# Patient Record
Sex: MALE | Race: WHITE | NOT HISPANIC OR LATINO | Employment: FULL TIME | URBAN - METROPOLITAN AREA
[De-identification: names, ages, dates, MRNs, and addresses within clinical notes are randomized per-mention and may not be internally consistent; named-entity substitution may affect disease eponyms.]

---

## 2017-05-12 ENCOUNTER — APPOINTMENT (OUTPATIENT)
Dept: WOMENS IMAGING | Facility: HOSPITAL | Age: 52
End: 2017-05-12

## 2017-05-12 PROCEDURE — 71035: CPT | Performed by: RADIOLOGY

## 2018-01-10 ENCOUNTER — OFFICE VISIT CONVERTED (OUTPATIENT)
Dept: UROLOGY | Facility: CLINIC | Age: 53
End: 2018-01-10
Attending: NURSE PRACTITIONER

## 2018-02-08 ENCOUNTER — OFFICE VISIT CONVERTED (OUTPATIENT)
Dept: FAMILY MEDICINE CLINIC | Facility: CLINIC | Age: 53
End: 2018-02-08
Attending: NURSE PRACTITIONER

## 2018-03-15 ENCOUNTER — OFFICE VISIT CONVERTED (OUTPATIENT)
Dept: FAMILY MEDICINE CLINIC | Facility: CLINIC | Age: 53
End: 2018-03-15
Attending: NURSE PRACTITIONER

## 2018-04-24 ENCOUNTER — OFFICE VISIT CONVERTED (OUTPATIENT)
Dept: UROLOGY | Facility: CLINIC | Age: 53
End: 2018-04-24
Attending: NURSE PRACTITIONER

## 2018-04-24 ENCOUNTER — CONVERSION ENCOUNTER (OUTPATIENT)
Dept: UROLOGY | Facility: CLINIC | Age: 53
End: 2018-04-24

## 2018-05-17 ENCOUNTER — APPOINTMENT (OUTPATIENT)
Dept: WOMENS IMAGING | Facility: HOSPITAL | Age: 53
End: 2018-05-17

## 2018-05-17 PROCEDURE — 71045 X-RAY EXAM CHEST 1 VIEW: CPT | Performed by: RADIOLOGY

## 2018-06-04 ENCOUNTER — OFFICE VISIT CONVERTED (OUTPATIENT)
Dept: FAMILY MEDICINE CLINIC | Facility: CLINIC | Age: 53
End: 2018-06-04
Attending: NURSE PRACTITIONER

## 2018-06-04 ENCOUNTER — CONVERSION ENCOUNTER (OUTPATIENT)
Dept: FAMILY MEDICINE CLINIC | Facility: CLINIC | Age: 53
End: 2018-06-04

## 2018-07-26 ENCOUNTER — CONVERSION ENCOUNTER (OUTPATIENT)
Dept: GASTROENTEROLOGY | Facility: CLINIC | Age: 53
End: 2018-07-26

## 2018-07-26 ENCOUNTER — OFFICE VISIT CONVERTED (OUTPATIENT)
Dept: GASTROENTEROLOGY | Facility: CLINIC | Age: 53
End: 2018-07-26
Attending: NURSE PRACTITIONER

## 2018-11-19 ENCOUNTER — CONVERSION ENCOUNTER (OUTPATIENT)
Dept: UROLOGY | Facility: CLINIC | Age: 53
End: 2018-11-19

## 2018-11-19 ENCOUNTER — OFFICE VISIT CONVERTED (OUTPATIENT)
Dept: UROLOGY | Facility: CLINIC | Age: 53
End: 2018-11-19
Attending: NURSE PRACTITIONER

## 2018-12-04 ENCOUNTER — OFFICE VISIT CONVERTED (OUTPATIENT)
Dept: FAMILY MEDICINE CLINIC | Facility: CLINIC | Age: 53
End: 2018-12-04
Attending: NURSE PRACTITIONER

## 2019-05-09 ENCOUNTER — HOSPITAL ENCOUNTER (OUTPATIENT)
Dept: OTHER | Facility: HOSPITAL | Age: 54
Discharge: HOME | End: 2019-05-09

## 2019-05-09 LAB
ALBUMIN SERPL-MCNC: 4.5 G/DL (ref 3.5–5)
ALBUMIN/GLOB SERPL: 1.7 {RATIO} (ref 1.4–2.6)
ALP SERPL-CCNC: 42 U/L (ref 56–119)
ALT SERPL-CCNC: 36 U/L (ref 10–40)
ANION GAP SERPL CALC-SCNC: 17 MMOL/L (ref 8–19)
AST SERPL-CCNC: 22 U/L (ref 15–50)
BASOPHILS # BLD AUTO: 0.07 10*3/UL (ref 0–0.2)
BASOPHILS NFR BLD AUTO: 0.7 % (ref 0–3)
BILIRUB SERPL-MCNC: 0.4 MG/DL (ref 0.2–1.3)
BUN SERPL-MCNC: 17 MG/DL (ref 5–25)
BUN/CREAT SERPL: 16 {RATIO} (ref 6–20)
CALCIUM SERPL-MCNC: 9.6 MG/DL (ref 8.7–10.4)
CHLORIDE SERPL-SCNC: 98 MMOL/L (ref 99–111)
CONV ABS IMM GRAN: 0.04 10*3/UL (ref 0–0.2)
CONV CO2: 25 MMOL/L (ref 22–32)
CONV IMMATURE GRAN: 0.4 % (ref 0–1.8)
CONV TOTAL PROTEIN: 7.2 G/DL (ref 6.3–8.2)
CREAT UR-MCNC: 1.07 MG/DL (ref 0.7–1.2)
DEPRECATED RDW RBC AUTO: 48 FL (ref 35.1–43.9)
EOSINOPHIL # BLD AUTO: 0.24 10*3/UL (ref 0–0.7)
EOSINOPHIL # BLD AUTO: 2.4 % (ref 0–7)
ERYTHROCYTE [DISTWIDTH] IN BLOOD BY AUTOMATED COUNT: 14.6 % (ref 11.6–14.4)
FSH SERPL-ACNC: 0.1 M[IU]/ML
GFR SERPLBLD BASED ON 1.73 SQ M-ARVRAT: >60 ML/MIN/{1.73_M2}
GLOBULIN UR ELPH-MCNC: 2.7 G/DL (ref 2–3.5)
GLUCOSE SERPL-MCNC: 108 MG/DL (ref 70–99)
HBA1C MFR BLD: 14.8 G/DL (ref 14–18)
HCT VFR BLD AUTO: 44.9 % (ref 42–52)
LH SERPL-ACNC: 0.1 M[IU]/ML
LYMPHOCYTES # BLD AUTO: 2.79 10*3/UL (ref 1–5)
MCH RBC QN AUTO: 29.7 PG (ref 27–31)
MCHC RBC AUTO-ENTMCNC: 33 G/DL (ref 33–37)
MCV RBC AUTO: 90 FL (ref 80–96)
MONOCYTES # BLD AUTO: 0.75 10*3/UL (ref 0.2–1.2)
MONOCYTES NFR BLD AUTO: 7.5 % (ref 3–10)
NEUTROPHILS # BLD AUTO: 6.05 10*3/UL (ref 2–8)
NEUTROPHILS NFR BLD AUTO: 60.9 % (ref 30–85)
NRBC CBCN: 0 % (ref 0–0.7)
OSMOLALITY SERPL CALC.SUM OF ELEC: 284 MOSM/KG (ref 273–304)
PLATELET # BLD AUTO: 188 10*3/UL (ref 130–400)
PMV BLD AUTO: 10.8 FL (ref 9.4–12.4)
POTASSIUM SERPL-SCNC: 3.8 MMOL/L (ref 3.5–5.3)
RBC # BLD AUTO: 4.99 10*6/UL (ref 4.7–6.1)
SODIUM SERPL-SCNC: 136 MMOL/L (ref 135–147)
VARIANT LYMPHS NFR BLD MANUAL: 28.1 % (ref 20–45)
WBC # BLD AUTO: 9.94 10*3/UL (ref 4.8–10.8)

## 2019-05-16 ENCOUNTER — HOSPITAL ENCOUNTER (OUTPATIENT)
Dept: OTHER | Facility: HOSPITAL | Age: 54
Discharge: HOME | End: 2019-05-16

## 2019-05-20 ENCOUNTER — CONVERSION ENCOUNTER (OUTPATIENT)
Dept: UROLOGY | Facility: CLINIC | Age: 54
End: 2019-05-20

## 2019-05-20 ENCOUNTER — OFFICE VISIT CONVERTED (OUTPATIENT)
Dept: UROLOGY | Facility: CLINIC | Age: 54
End: 2019-05-20
Attending: NURSE PRACTITIONER

## 2019-05-25 LAB
BIOAVAILABLE TESTOSTERONE, %: 60.7 %
CONV TESTOSTERONE, FREE: 102 PG/ML
SHBG SERPL-SCNC: 22.7 NMOL/L
TESTOST FREE MFR SERPL: 2.4 %
TESTOST SERPL-MCNC: 426 NG/DL
TESTOSTERONE.FREE+WB SERPL-MCNC: 259 NG/DL

## 2019-05-30 ENCOUNTER — HOSPITAL ENCOUNTER (OUTPATIENT)
Dept: CARDIOLOGY | Facility: HOSPITAL | Age: 54
Discharge: HOME | End: 2019-05-30
Attending: NURSE PRACTITIONER

## 2019-05-30 LAB
25(OH)D3 SERPL-MCNC: 34.8 NG/ML (ref 30–100)
ALBUMIN SERPL-MCNC: 4.5 G/DL (ref 3.5–5)
ALBUMIN/GLOB SERPL: 1.8 {RATIO} (ref 1.4–2.6)
ALP SERPL-CCNC: 37 U/L (ref 56–119)
ALT SERPL-CCNC: 29 U/L (ref 10–40)
ANION GAP SERPL CALC-SCNC: 17 MMOL/L (ref 8–19)
APTT BLD: 21.6 S (ref 22.2–34.2)
AST SERPL-CCNC: 19 U/L (ref 15–50)
BASOPHILS # BLD AUTO: 0.08 10*3/UL (ref 0–0.2)
BASOPHILS NFR BLD AUTO: 1 % (ref 0–3)
BILIRUB SERPL-MCNC: 0.42 MG/DL (ref 0.2–1.3)
BUN SERPL-MCNC: 12 MG/DL (ref 5–25)
BUN/CREAT SERPL: 15 {RATIO} (ref 6–20)
CALCIUM SERPL-MCNC: 9.2 MG/DL (ref 8.7–10.4)
CHLORIDE SERPL-SCNC: 101 MMOL/L (ref 99–111)
CHOLEST SERPL-MCNC: 119 MG/DL (ref 107–200)
CHOLEST/HDLC SERPL: 2.9 {RATIO} (ref 3–6)
CONV ABS IMM GRAN: 0.04 10*3/UL (ref 0–0.2)
CONV CO2: 26 MMOL/L (ref 22–32)
CONV IMMATURE GRAN: 0.5 % (ref 0–1.8)
CONV TOTAL PROTEIN: 7 G/DL (ref 6.3–8.2)
CREAT UR-MCNC: 0.79 MG/DL (ref 0.7–1.2)
DEPRECATED RDW RBC AUTO: 47.9 FL (ref 35.1–43.9)
EOSINOPHIL # BLD AUTO: 0.27 10*3/UL (ref 0–0.7)
EOSINOPHIL # BLD AUTO: 3.5 % (ref 0–7)
ERYTHROCYTE [DISTWIDTH] IN BLOOD BY AUTOMATED COUNT: 14.5 % (ref 11.6–14.4)
EST. AVERAGE GLUCOSE BLD GHB EST-MCNC: 154 MG/DL
GFR SERPLBLD BASED ON 1.73 SQ M-ARVRAT: >60 ML/MIN/{1.73_M2}
GLOBULIN UR ELPH-MCNC: 2.5 G/DL (ref 2–3.5)
GLUCOSE SERPL-MCNC: 129 MG/DL (ref 70–99)
HBA1C MFR BLD: 15 G/DL (ref 14–18)
HBA1C MFR BLD: 7 % (ref 3.5–5.7)
HCT VFR BLD AUTO: 45.7 % (ref 42–52)
HDLC SERPL-MCNC: 41 MG/DL (ref 40–60)
INR PPP: 0.95 (ref 2–3)
LDLC SERPL CALC-MCNC: 50 MG/DL (ref 70–100)
LYMPHOCYTES # BLD AUTO: 2.44 10*3/UL (ref 1–5)
MCH RBC QN AUTO: 29.6 PG (ref 27–31)
MCHC RBC AUTO-ENTMCNC: 32.8 G/DL (ref 33–37)
MCV RBC AUTO: 90.1 FL (ref 80–96)
MONOCYTES # BLD AUTO: 0.72 10*3/UL (ref 0.2–1.2)
MONOCYTES NFR BLD AUTO: 9.4 % (ref 3–10)
NEUTROPHILS # BLD AUTO: 4.11 10*3/UL (ref 2–8)
NEUTROPHILS NFR BLD AUTO: 53.7 % (ref 30–85)
NRBC CBCN: 0 % (ref 0–0.7)
OSMOLALITY SERPL CALC.SUM OF ELEC: 291 MOSM/KG (ref 273–304)
PHOSPHATE SERPL-MCNC: 3.2 MG/DL (ref 2.4–4.5)
PLATELET # BLD AUTO: 190 10*3/UL (ref 130–400)
PMV BLD AUTO: 10.7 FL (ref 9.4–12.4)
POTASSIUM SERPL-SCNC: 4.4 MMOL/L (ref 3.5–5.3)
PROTHROMBIN TIME: 10 S (ref 9.4–12)
RBC # BLD AUTO: 5.07 10*6/UL (ref 4.7–6.1)
SODIUM SERPL-SCNC: 140 MMOL/L (ref 135–147)
TRIGL SERPL-MCNC: 140 MG/DL (ref 40–150)
VARIANT LYMPHS NFR BLD MANUAL: 31.9 % (ref 20–45)
VLDLC SERPL-MCNC: 28 MG/DL (ref 5–37)
WBC # BLD AUTO: 7.66 10*3/UL (ref 4.8–10.8)

## 2019-05-31 LAB
C3 SERPL-MCNC: 126 MG/DL (ref 82–167)
C4 SERPL-MCNC: 18 MG/DL (ref 14–44)
CONV ANTI NUCLEAR ANTIBODY WITH REFLEX: NEGATIVE
CONV HEPATITIS B SURFACE AG W CONFIRMATION RE: NEGATIVE
HAV IGM SERPL QL IA: NEGATIVE
HBV CORE IGM SERPL QL IA: NEGATIVE
HCV AB SER DONR QL: <0.1 S/CO RATIO (ref 0–0.9)

## 2019-06-26 ENCOUNTER — HOSPITAL ENCOUNTER (OUTPATIENT)
Dept: FAMILY MEDICINE CLINIC | Facility: CLINIC | Age: 54
Discharge: HOME | End: 2019-06-26
Attending: NURSE PRACTITIONER

## 2019-06-26 ENCOUNTER — APPOINTMENT (OUTPATIENT)
Dept: WOMENS IMAGING | Facility: HOSPITAL | Age: 54
End: 2019-06-26

## 2019-06-26 PROCEDURE — 71045 X-RAY EXAM CHEST 1 VIEW: CPT | Performed by: RADIOLOGY

## 2019-08-07 ENCOUNTER — HOSPITAL ENCOUNTER (OUTPATIENT)
Dept: OTHER | Facility: HOSPITAL | Age: 54
Discharge: HOME | End: 2019-08-07
Attending: NURSE PRACTITIONER

## 2019-08-08 ENCOUNTER — OFFICE VISIT CONVERTED (OUTPATIENT)
Dept: FAMILY MEDICINE CLINIC | Facility: CLINIC | Age: 54
End: 2019-08-08
Attending: NURSE PRACTITIONER

## 2019-09-10 ENCOUNTER — OFFICE VISIT CONVERTED (OUTPATIENT)
Dept: ORTHOPEDIC SURGERY | Facility: CLINIC | Age: 54
End: 2019-09-10
Attending: ORTHOPAEDIC SURGERY

## 2019-10-17 ENCOUNTER — OFFICE VISIT CONVERTED (OUTPATIENT)
Dept: ORTHOPEDIC SURGERY | Facility: CLINIC | Age: 54
End: 2019-10-17
Attending: ORTHOPAEDIC SURGERY

## 2019-12-12 ENCOUNTER — OFFICE VISIT CONVERTED (OUTPATIENT)
Dept: ORTHOPEDIC SURGERY | Facility: CLINIC | Age: 54
End: 2019-12-12
Attending: ORTHOPAEDIC SURGERY

## 2019-12-27 ENCOUNTER — HOSPITAL ENCOUNTER (OUTPATIENT)
Dept: OTHER | Facility: HOSPITAL | Age: 54
Discharge: HOME | End: 2019-12-27
Attending: NURSE PRACTITIONER

## 2019-12-27 LAB
ALBUMIN SERPL-MCNC: 4.4 G/DL (ref 3.5–5)
ALBUMIN/GLOB SERPL: 1.6 {RATIO} (ref 1.4–2.6)
ALP SERPL-CCNC: 47 U/L (ref 56–119)
ALT SERPL-CCNC: 31 U/L (ref 10–40)
ANION GAP SERPL CALC-SCNC: 21 MMOL/L (ref 8–19)
AST SERPL-CCNC: 18 U/L (ref 15–50)
BASOPHILS # BLD AUTO: 0.08 10*3/UL (ref 0–0.2)
BASOPHILS NFR BLD AUTO: 0.8 % (ref 0–3)
BILIRUB SERPL-MCNC: 0.39 MG/DL (ref 0.2–1.3)
BUN SERPL-MCNC: 16 MG/DL (ref 5–25)
BUN/CREAT SERPL: 15 {RATIO} (ref 6–20)
CALCIUM SERPL-MCNC: 9.8 MG/DL (ref 8.7–10.4)
CHLORIDE SERPL-SCNC: 101 MMOL/L (ref 99–111)
CONV ABS IMM GRAN: 0.06 10*3/UL (ref 0–0.2)
CONV CO2: 22 MMOL/L (ref 22–32)
CONV IMMATURE GRAN: 0.6 % (ref 0–1.8)
CONV TOTAL PROTEIN: 7.2 G/DL (ref 6.3–8.2)
CREAT UR-MCNC: 1.05 MG/DL (ref 0.7–1.2)
DEPRECATED RDW RBC AUTO: 44.3 FL (ref 35.1–43.9)
EOSINOPHIL # BLD AUTO: 0.23 10*3/UL (ref 0–0.7)
EOSINOPHIL # BLD AUTO: 2.4 % (ref 0–7)
ERYTHROCYTE [DISTWIDTH] IN BLOOD BY AUTOMATED COUNT: 13.5 % (ref 11.6–14.4)
FSH SERPL-ACNC: 0.1 M[IU]/ML
GFR SERPLBLD BASED ON 1.73 SQ M-ARVRAT: >60 ML/MIN/{1.73_M2}
GLOBULIN UR ELPH-MCNC: 2.8 G/DL (ref 2–3.5)
GLUCOSE SERPL-MCNC: 187 MG/DL (ref 70–99)
HCT VFR BLD AUTO: 46.9 % (ref 42–52)
HGB BLD-MCNC: 15.6 G/DL (ref 14–18)
LH SERPL-ACNC: 0.1 M[IU]/ML
LYMPHOCYTES # BLD AUTO: 2.89 10*3/UL (ref 1–5)
LYMPHOCYTES NFR BLD AUTO: 29.9 % (ref 20–45)
MCH RBC QN AUTO: 29.7 PG (ref 27–31)
MCHC RBC AUTO-ENTMCNC: 33.3 G/DL (ref 33–37)
MCV RBC AUTO: 89.3 FL (ref 80–96)
MONOCYTES # BLD AUTO: 0.82 10*3/UL (ref 0.2–1.2)
MONOCYTES NFR BLD AUTO: 8.5 % (ref 3–10)
NEUTROPHILS # BLD AUTO: 5.6 10*3/UL (ref 2–8)
NEUTROPHILS NFR BLD AUTO: 57.8 % (ref 30–85)
NRBC CBCN: 0 % (ref 0–0.7)
OSMOLALITY SERPL CALC.SUM OF ELEC: 296 MOSM/KG (ref 273–304)
PLATELET # BLD AUTO: 191 10*3/UL (ref 130–400)
PMV BLD AUTO: 10.3 FL (ref 9.4–12.4)
POTASSIUM SERPL-SCNC: 4.4 MMOL/L (ref 3.5–5.3)
PSA SERPL-MCNC: 1.29 NG/ML (ref 0–4)
RBC # BLD AUTO: 5.25 10*6/UL (ref 4.7–6.1)
SODIUM SERPL-SCNC: 140 MMOL/L (ref 135–147)
WBC # BLD AUTO: 9.68 10*3/UL (ref 4.8–10.8)

## 2019-12-31 LAB
BIOAVAILABLE TESTOSTERONE, %: 49.1 %
CONV TESTOSTERONE, FREE: 96 PG/ML
SHBG SERPL-SCNC: 18.4 NMOL/L
TESTOST FREE MFR SERPL: 2.7 %
TESTOST SERPL-MCNC: 356 NG/DL
TESTOSTERONE.FREE+WB SERPL-MCNC: 175 NG/DL

## 2020-01-15 ENCOUNTER — HOSPITAL ENCOUNTER (OUTPATIENT)
Dept: PERIOP | Facility: HOSPITAL | Age: 55
Setting detail: HOSPITAL OUTPATIENT SURGERY
Discharge: HOME | End: 2020-01-15
Attending: ORTHOPAEDIC SURGERY

## 2020-01-15 LAB — GLUCOSE BLD-MCNC: 200 MG/DL (ref 70–99)

## 2020-01-22 ENCOUNTER — CONVERSION ENCOUNTER (OUTPATIENT)
Dept: OTHER | Facility: HOSPITAL | Age: 55
End: 2020-01-22

## 2020-01-22 ENCOUNTER — OFFICE VISIT CONVERTED (OUTPATIENT)
Dept: UROLOGY | Facility: CLINIC | Age: 55
End: 2020-01-22
Attending: NURSE PRACTITIONER

## 2020-01-30 ENCOUNTER — OFFICE VISIT CONVERTED (OUTPATIENT)
Dept: ORTHOPEDIC SURGERY | Facility: CLINIC | Age: 55
End: 2020-01-30
Attending: PHYSICIAN ASSISTANT

## 2020-02-13 ENCOUNTER — OFFICE VISIT CONVERTED (OUTPATIENT)
Dept: FAMILY MEDICINE CLINIC | Facility: CLINIC | Age: 55
End: 2020-02-13
Attending: NURSE PRACTITIONER

## 2020-02-27 ENCOUNTER — OFFICE VISIT CONVERTED (OUTPATIENT)
Dept: ORTHOPEDIC SURGERY | Facility: CLINIC | Age: 55
End: 2020-02-27
Attending: PHYSICIAN ASSISTANT

## 2020-06-03 ENCOUNTER — OFFICE VISIT CONVERTED (OUTPATIENT)
Dept: FAMILY MEDICINE CLINIC | Facility: CLINIC | Age: 55
End: 2020-06-03
Attending: NURSE PRACTITIONER

## 2020-06-03 ENCOUNTER — CONVERSION ENCOUNTER (OUTPATIENT)
Dept: FAMILY MEDICINE CLINIC | Facility: CLINIC | Age: 55
End: 2020-06-03

## 2020-06-04 ENCOUNTER — HOSPITAL ENCOUNTER (OUTPATIENT)
Dept: FAMILY MEDICINE CLINIC | Facility: CLINIC | Age: 55
Discharge: HOME | End: 2020-06-04
Attending: NURSE PRACTITIONER

## 2020-07-23 ENCOUNTER — HOSPITAL ENCOUNTER (OUTPATIENT)
Dept: OTHER | Facility: HOSPITAL | Age: 55
Discharge: HOME | End: 2020-07-23
Attending: NURSE PRACTITIONER

## 2020-07-23 LAB
ALBUMIN SERPL-MCNC: 4.7 G/DL (ref 3.5–5)
ALBUMIN/GLOB SERPL: 1.7 {RATIO} (ref 1.4–2.6)
ALP SERPL-CCNC: 45 U/L (ref 56–119)
ALT SERPL-CCNC: 33 U/L (ref 10–40)
ANION GAP SERPL CALC-SCNC: 18 MMOL/L (ref 8–19)
AST SERPL-CCNC: 20 U/L (ref 15–50)
BASOPHILS # BLD AUTO: 0.04 10*3/UL (ref 0–0.2)
BASOPHILS NFR BLD AUTO: 0.3 % (ref 0–3)
BILIRUB SERPL-MCNC: 0.45 MG/DL (ref 0.2–1.3)
BUN SERPL-MCNC: 21 MG/DL (ref 5–25)
BUN/CREAT SERPL: 22 {RATIO} (ref 6–20)
CALCIUM SERPL-MCNC: 9.5 MG/DL (ref 8.7–10.4)
CHLORIDE SERPL-SCNC: 100 MMOL/L (ref 99–111)
CONV ABS IMM GRAN: 0.1 10*3/UL (ref 0–0.2)
CONV CO2: 24 MMOL/L (ref 22–32)
CONV IMMATURE GRAN: 0.7 % (ref 0–1.8)
CONV TOTAL PROTEIN: 7.4 G/DL (ref 6.3–8.2)
CREAT UR-MCNC: 0.95 MG/DL (ref 0.7–1.2)
DEPRECATED RDW RBC AUTO: 46.7 FL (ref 35.1–43.9)
EOSINOPHIL # BLD AUTO: 0.01 10*3/UL (ref 0–0.7)
EOSINOPHIL # BLD AUTO: 0.1 % (ref 0–7)
ERYTHROCYTE [DISTWIDTH] IN BLOOD BY AUTOMATED COUNT: 14.6 % (ref 11.6–14.4)
FSH SERPL-ACNC: 0.1 M[IU]/ML
GFR SERPLBLD BASED ON 1.73 SQ M-ARVRAT: >60 ML/MIN/{1.73_M2}
GLOBULIN UR ELPH-MCNC: 2.7 G/DL (ref 2–3.5)
GLUCOSE SERPL-MCNC: 211 MG/DL (ref 70–99)
HCT VFR BLD AUTO: 43.7 % (ref 42–52)
HGB BLD-MCNC: 14.5 G/DL (ref 14–18)
LH SERPL-ACNC: 0.1 M[IU]/ML
LYMPHOCYTES # BLD AUTO: 1.88 10*3/UL (ref 1–5)
LYMPHOCYTES NFR BLD AUTO: 14.1 % (ref 20–45)
MCH RBC QN AUTO: 28.9 PG (ref 27–31)
MCHC RBC AUTO-ENTMCNC: 33.2 G/DL (ref 33–37)
MCV RBC AUTO: 87.2 FL (ref 80–96)
MONOCYTES # BLD AUTO: 0.71 10*3/UL (ref 0.2–1.2)
MONOCYTES NFR BLD AUTO: 5.3 % (ref 3–10)
NEUTROPHILS # BLD AUTO: 10.61 10*3/UL (ref 2–8)
NEUTROPHILS NFR BLD AUTO: 79.5 % (ref 30–85)
NRBC CBCN: 0 % (ref 0–0.7)
OSMOLALITY SERPL CALC.SUM OF ELEC: 295 MOSM/KG (ref 273–304)
PLATELET # BLD AUTO: 223 10*3/UL (ref 130–400)
PMV BLD AUTO: 10.6 FL (ref 9.4–12.4)
POTASSIUM SERPL-SCNC: 4.3 MMOL/L (ref 3.5–5.3)
PROLACTIN SERPL-MCNC: 6.31 NG/ML
RBC # BLD AUTO: 5.01 10*6/UL (ref 4.7–6.1)
SODIUM SERPL-SCNC: 138 MMOL/L (ref 135–147)
WBC # BLD AUTO: 13.35 10*3/UL (ref 4.8–10.8)

## 2020-07-28 LAB
BIOAVAILABLE TESTOSTERONE, %: 72.4 %
CONV TESTOSTERONE, FREE: 277 PG/ML
SHBG SERPL-SCNC: 15.9 NMOL/L
TESTOST FREE MFR SERPL: 4.1 %
TESTOST SERPL-MCNC: 676 NG/DL
TESTOSTERONE.FREE+WB SERPL-MCNC: 489 NG/DL

## 2020-07-30 ENCOUNTER — CONVERSION ENCOUNTER (OUTPATIENT)
Dept: OTHER | Facility: HOSPITAL | Age: 55
End: 2020-07-30

## 2020-07-30 ENCOUNTER — OFFICE VISIT CONVERTED (OUTPATIENT)
Dept: NEUROSURGERY | Facility: CLINIC | Age: 55
End: 2020-07-30
Attending: PHYSICIAN ASSISTANT

## 2020-08-03 ENCOUNTER — HOSPITAL ENCOUNTER (OUTPATIENT)
Dept: GENERAL RADIOLOGY | Facility: HOSPITAL | Age: 55
Discharge: HOME | End: 2020-08-03
Attending: FAMILY MEDICINE

## 2020-08-04 ENCOUNTER — OFFICE VISIT CONVERTED (OUTPATIENT)
Dept: NEUROSURGERY | Facility: CLINIC | Age: 55
End: 2020-08-04
Attending: PHYSICIAN ASSISTANT

## 2020-08-06 ENCOUNTER — OFFICE VISIT CONVERTED (OUTPATIENT)
Dept: NEUROLOGY | Facility: CLINIC | Age: 55
End: 2020-08-06
Attending: PSYCHIATRY & NEUROLOGY

## 2020-08-10 ENCOUNTER — OFFICE VISIT CONVERTED (OUTPATIENT)
Dept: NEUROLOGY | Facility: CLINIC | Age: 55
End: 2020-08-10
Attending: PSYCHIATRY & NEUROLOGY

## 2020-08-10 ENCOUNTER — CONVERSION ENCOUNTER (OUTPATIENT)
Dept: NEUROLOGY | Facility: CLINIC | Age: 55
End: 2020-08-10

## 2020-08-11 ENCOUNTER — CONVERSION ENCOUNTER (OUTPATIENT)
Dept: NEUROLOGY | Facility: CLINIC | Age: 55
End: 2020-08-11

## 2020-08-11 ENCOUNTER — OFFICE VISIT CONVERTED (OUTPATIENT)
Dept: NEUROSURGERY | Facility: CLINIC | Age: 55
End: 2020-08-11
Attending: PHYSICIAN ASSISTANT

## 2020-08-21 ENCOUNTER — OFFICE VISIT CONVERTED (OUTPATIENT)
Dept: UROLOGY | Facility: CLINIC | Age: 55
End: 2020-08-21
Attending: NURSE PRACTITIONER

## 2020-08-21 ENCOUNTER — CONVERSION ENCOUNTER (OUTPATIENT)
Dept: UROLOGY | Facility: CLINIC | Age: 55
End: 2020-08-21

## 2020-08-27 ENCOUNTER — HOSPITAL ENCOUNTER (OUTPATIENT)
Dept: MRI IMAGING | Facility: HOSPITAL | Age: 55
Discharge: HOME | End: 2020-08-27
Attending: PHYSICIAN ASSISTANT

## 2020-08-28 ENCOUNTER — HOSPITAL ENCOUNTER (OUTPATIENT)
Dept: OTHER | Facility: HOSPITAL | Age: 55
Discharge: HOME | End: 2020-08-28
Attending: PHYSICIAN ASSISTANT

## 2020-08-28 LAB
CRP SERPL-MCNC: 1.2 MG/L (ref 0–5)
ERYTHROCYTE [SEDIMENTATION RATE] IN BLOOD: 8 MM/H (ref 0–20)

## 2020-09-03 ENCOUNTER — OFFICE VISIT CONVERTED (OUTPATIENT)
Dept: NEUROSURGERY | Facility: CLINIC | Age: 55
End: 2020-09-03
Attending: PHYSICIAN ASSISTANT

## 2020-10-09 ENCOUNTER — HOSPITAL ENCOUNTER (OUTPATIENT)
Dept: PREADMISSION TESTING | Facility: HOSPITAL | Age: 55
Discharge: HOME | End: 2020-10-09
Attending: NEUROLOGICAL SURGERY

## 2020-10-09 LAB
ANION GAP SERPL CALC-SCNC: 16 MMOL/L (ref 8–19)
BUN SERPL-MCNC: 13 MG/DL (ref 5–25)
BUN/CREAT SERPL: 14 {RATIO} (ref 6–20)
CALCIUM SERPL-MCNC: 9.6 MG/DL (ref 8.7–10.4)
CHLORIDE SERPL-SCNC: 98 MMOL/L (ref 99–111)
CONV CO2: 26 MMOL/L (ref 22–32)
CREAT UR-MCNC: 0.91 MG/DL (ref 0.7–1.2)
GFR SERPLBLD BASED ON 1.73 SQ M-ARVRAT: >60 ML/MIN/{1.73_M2}
GLUCOSE SERPL-MCNC: 163 MG/DL (ref 70–99)
OSMOLALITY SERPL CALC.SUM OF ELEC: 286 MOSM/KG (ref 273–304)
POTASSIUM SERPL-SCNC: 4.1 MMOL/L (ref 3.5–5.3)
SODIUM SERPL-SCNC: 136 MMOL/L (ref 135–147)

## 2020-10-11 LAB — SARS-COV-2 RNA SPEC QL NAA+PROBE: NOT DETECTED

## 2020-10-14 ENCOUNTER — HOSPITAL ENCOUNTER (OUTPATIENT)
Dept: PERIOP | Facility: HOSPITAL | Age: 55
Setting detail: HOSPITAL OUTPATIENT SURGERY
Discharge: HOME | End: 2020-10-14
Attending: NEUROLOGICAL SURGERY

## 2020-10-14 LAB
GLUCOSE BLD-MCNC: 169 MG/DL (ref 70–99)
GLUCOSE BLD-MCNC: 234 MG/DL (ref 70–99)

## 2020-10-20 ENCOUNTER — OFFICE VISIT CONVERTED (OUTPATIENT)
Dept: NEUROSURGERY | Facility: CLINIC | Age: 55
End: 2020-10-20
Attending: PHYSICIAN ASSISTANT

## 2020-11-05 ENCOUNTER — OFFICE VISIT CONVERTED (OUTPATIENT)
Dept: NEUROSURGERY | Facility: CLINIC | Age: 55
End: 2020-11-05
Attending: PHYSICIAN ASSISTANT

## 2020-11-06 ENCOUNTER — OFFICE VISIT CONVERTED (OUTPATIENT)
Dept: NEUROLOGY | Facility: CLINIC | Age: 55
End: 2020-11-06
Attending: PSYCHIATRY & NEUROLOGY

## 2020-12-01 ENCOUNTER — OFFICE VISIT CONVERTED (OUTPATIENT)
Dept: NEUROSURGERY | Facility: CLINIC | Age: 55
End: 2020-12-01
Attending: PHYSICIAN ASSISTANT

## 2020-12-01 ENCOUNTER — HOSPITAL ENCOUNTER (OUTPATIENT)
Dept: MRI IMAGING | Facility: HOSPITAL | Age: 55
Discharge: HOME | End: 2020-12-01
Attending: PSYCHIATRY & NEUROLOGY

## 2020-12-01 LAB
CREAT BLD-MCNC: 0.8 MG/DL (ref 0.6–1.4)
GFR SERPLBLD BASED ON 1.73 SQ M-ARVRAT: >60 ML/MIN/{1.73_M2}

## 2021-02-15 ENCOUNTER — OFFICE VISIT CONVERTED (OUTPATIENT)
Dept: UROLOGY | Facility: CLINIC | Age: 56
End: 2021-02-15
Attending: NURSE PRACTITIONER

## 2021-02-15 ENCOUNTER — CONVERSION ENCOUNTER (OUTPATIENT)
Dept: UROLOGY | Facility: CLINIC | Age: 56
End: 2021-02-15

## 2021-02-15 LAB
BILIRUB UR QL STRIP: NEGATIVE
COLOR UR: YELLOW
CONV BACTERIA IN URINE MICRO: 0
CONV CALCIUM OXALATE CRYSTALS /HPF IN URINE SEDIMENT BY MICROSCOPY: 0
CONV CLARITY OF URINE: CLEAR
CONV PROTEIN IN URINE BY AUTOMATED TEST STRIP: NEGATIVE
CONV UROBILINOGEN IN URINE BY AUTOMATED TEST STRIP: NORMAL
GLUCOSE UR QL: NEGATIVE
HGB UR QL STRIP: NEGATIVE
KETONES UR QL STRIP: NEGATIVE
LEUKOCYTE ESTERASE UR QL STRIP: NEGATIVE
NITRITE UR QL STRIP: NEGATIVE
PH UR STRIP.AUTO: 6 [PH]
RBC #/AREA URNS HPF: 0 /[HPF]
RENAL EPI CELLS #/AREA URNS HPF: 0 /[HPF]
SP GR UR: 1.02
SQUAMOUS SPT QL MICRO: 0
WBC #/AREA URNS HPF: 0 /[HPF]

## 2021-03-06 ENCOUNTER — HOSPITAL ENCOUNTER (OUTPATIENT)
Dept: OTHER | Facility: HOSPITAL | Age: 56
Discharge: HOME | End: 2021-03-06
Attending: NURSE PRACTITIONER

## 2021-03-06 LAB
ALBUMIN SERPL-MCNC: 4.1 G/DL (ref 3.5–5)
ALBUMIN/GLOB SERPL: 1.6 {RATIO} (ref 1.4–2.6)
ALP SERPL-CCNC: 49 U/L (ref 56–119)
ALT SERPL-CCNC: 35 U/L (ref 10–40)
ANION GAP SERPL CALC-SCNC: 15 MMOL/L (ref 8–19)
AST SERPL-CCNC: 29 U/L (ref 15–50)
BASOPHILS # BLD AUTO: 0.08 10*3/UL (ref 0–0.2)
BASOPHILS NFR BLD AUTO: 0.9 % (ref 0–3)
BILIRUB SERPL-MCNC: 0.28 MG/DL (ref 0.2–1.3)
BUN SERPL-MCNC: 15 MG/DL (ref 5–25)
BUN/CREAT SERPL: 16 {RATIO} (ref 6–20)
CALCIUM SERPL-MCNC: 9.3 MG/DL (ref 8.7–10.4)
CHLORIDE SERPL-SCNC: 101 MMOL/L (ref 99–111)
CONV ABS IMM GRAN: 0.04 10*3/UL (ref 0–0.2)
CONV CO2: 25 MMOL/L (ref 22–32)
CONV IMMATURE GRAN: 0.5 % (ref 0–1.8)
CONV TOTAL PROTEIN: 6.7 G/DL (ref 6.3–8.2)
CREAT UR-MCNC: 0.95 MG/DL (ref 0.7–1.2)
DEPRECATED RDW RBC AUTO: 46.5 FL (ref 35.1–43.9)
EOSINOPHIL # BLD AUTO: 0.34 10*3/UL (ref 0–0.7)
EOSINOPHIL # BLD AUTO: 3.9 % (ref 0–7)
ERYTHROCYTE [DISTWIDTH] IN BLOOD BY AUTOMATED COUNT: 15 % (ref 11.6–14.4)
FSH SERPL-ACNC: 0.1 M[IU]/ML
GFR SERPLBLD BASED ON 1.73 SQ M-ARVRAT: >60 ML/MIN/{1.73_M2}
GLOBULIN UR ELPH-MCNC: 2.6 G/DL (ref 2–3.5)
GLUCOSE SERPL-MCNC: 208 MG/DL (ref 70–99)
HCT VFR BLD AUTO: 44.5 % (ref 42–52)
HGB BLD-MCNC: 14.7 G/DL (ref 14–18)
LH SERPL-ACNC: 0.1 M[IU]/ML
LYMPHOCYTES # BLD AUTO: 2.5 10*3/UL (ref 1–5)
LYMPHOCYTES NFR BLD AUTO: 28.3 % (ref 20–45)
MCH RBC QN AUTO: 28.3 PG (ref 27–31)
MCHC RBC AUTO-ENTMCNC: 33 G/DL (ref 33–37)
MCV RBC AUTO: 85.6 FL (ref 80–96)
MONOCYTES # BLD AUTO: 0.7 10*3/UL (ref 0.2–1.2)
MONOCYTES NFR BLD AUTO: 7.9 % (ref 3–10)
NEUTROPHILS # BLD AUTO: 5.16 10*3/UL (ref 2–8)
NEUTROPHILS NFR BLD AUTO: 58.5 % (ref 30–85)
NRBC CBCN: 0 % (ref 0–0.7)
OSMOLALITY SERPL CALC.SUM OF ELEC: 291 MOSM/KG (ref 273–304)
PLATELET # BLD AUTO: 190 10*3/UL (ref 130–400)
PMV BLD AUTO: 10.3 FL (ref 9.4–12.4)
POTASSIUM SERPL-SCNC: 4.1 MMOL/L (ref 3.5–5.3)
PSA SERPL-MCNC: 1.1 NG/ML (ref 0–4)
RBC # BLD AUTO: 5.2 10*6/UL (ref 4.7–6.1)
SODIUM SERPL-SCNC: 137 MMOL/L (ref 135–147)
WBC # BLD AUTO: 8.82 10*3/UL (ref 4.8–10.8)

## 2021-03-13 LAB
BIOAVAILABLE TESTOSTERONE, %: 66.7 %
CONV TESTOSTERONE, FREE: 126 PG/ML
SHBG SERPL-SCNC: 17.8 NMOL/L
TESTOST FREE MFR SERPL: 2.3 %
TESTOST SERPL-MCNC: 549 NG/DL
TESTOSTERONE.FREE+WB SERPL-MCNC: 366 NG/DL

## 2021-05-07 NOTE — PROGRESS NOTES
Progress Note      Patient Name: Jeronimo Phillips   Patient ID: [id number]   Sex: Male   YOB: 1965        Visit Date: Michelle 3, 2020    Provider: SVETLANA Pantoja   Location: [facility]   Location Address: [address] [place] [address] [address] [place]  [address]   Location Phone: [phone]          Chief Complaint     lower back pain, bad for 2+ weeks       History Of Present Illness  Jeronmio Phillips is a 55 year old /White male who presents for evaluation and treatment of:      low back pain that is worsening over the past 2 weeks - he sees Pain Management for treatment of low back pain and is prescribed Hydrocodone and gabapentin - denies radiation of pain or loss of bowel or bladder control - pain is worse with leaning backward and prolonged sitting - usually symptoms will improve with a steroid injection and steroid dose pack - last injection 8/2019 (9 months)    Leaving for vacation soon to drive to [place]     had knee surgery in Jan 2020 for torn meniscus and states continues to have pain in knee       Past Medical History  Disease Name Date Onset Notes   Back pain --  --    Benign essential hypertension --  --    BPH without urinary obstruction --  --    GERD (gastroesophageal reflux disease) --  --    Heart Disease --  --    High cholesterol --  --    Hypothyroidism --  --    Sleep apnea --  --          Past Surgical History  Procedure Name Date Notes   Appendectomy --  --    Gallbladder removal --  --    Stent placment last 2007 Patient reports 5 stent placement surgeries         Medication List  Name Date Started Instructions   amlodipine 10 mg oral tablet  take 1 tablet (10 mg) by oral route once daily   Bystolic 5 mg oral tablet  take 1 tablet (5 mg) by oral route once daily   Crestor 40 mg oral tablet  take 1 tablet (40 mg) by oral route once daily   cyclobenzaprine 10 mg oral tablet  take 1 tablet by oral route 3 times a day As needed for back spasm   gabapentin  "800 mg oral tablet  take 1 tablet (800 mg) by oral route 3 times per day   hydrocodone-acetaminophen 7.5-325 mg/15 mL oral solution  --    Levoxyl 100 mcg oral tablet  take 2 tablets by oral route daily   nabumetone 750 mg oral tablet  take 2 tablets (1,500 mg) by oral route once daily   Nexium 40 mg oral capsule,delayed release(DR/EC)  --    Singulair 10 mg oral tablet 02/08/2018 take 1 tablet (10 mg) by oral route once daily in the evening for 30 days   Zyrtec 10 mg oral tablet 02/08/2018 take 1 tablet (10 mg) by oral route once daily for 30 days         Allergy List  Allergen Name Date Reaction Notes   Bactrim DS --  rash --    Plavix --  rash --          Social History  Finding Status Start/Stop Quantity Notes   Tobacco Current every day 20/-- 1 pack started in 1985         Immunizations  NameDate Admin Mfg Trade Name Lot Number Route Inj VIS Given VIS Publication   HepA02/07/2019 [facility] HAVRIX-ADULT f4el2 IM RD 02/07/2019 07/20/2016   Comments: NDC: [id number]   HepA05/17/2018 [facility] HAVRIX-ADULT   RD 05/17/2018 07/20/2016   Comments: NDC [id number]         Review of Systems  · Constitutional  o Denies  o : fever, chills, body aches  · Cardiovascular  o Denies  o : chest pain, palpitations  · Respiratory  o Denies  o : shortness of breath, wheezing, cough      Vitals  Date Time BP Position Site L\R Cuff Size HR RR TEMP (F) WT  HT  BMI kg/m2 BSA m2 O2 Sat        06/03/2020 04:09 /76 Sitting    99 - R  99 272lbs 8oz 5'  9\" 40.24 2.45 96 %          Physical Examination  · Constitutional  o Appearance  o : well developed, well-nourished, in no acute distress  · Eyes  o Conjunctivae  o : conjunctivae normal  o Pupils and Irises  o : pupils equal and round, pupils reactive to light bilaterally  · Neck  o Inspection/Palpation  o : supple  o Thyroid  o : no thyromegaly  · Respiratory  o Respiratory Effort  o : breathing unlabored  o Auscultation of Lungs  o : clear to " ascultation  · Cardiovascular  o Heart  o :   § Auscultation of Heart  § : regular rate and rhythm  o Peripheral Vascular System  o :   § Extremities  § : no edema  · Lymphatic  o Neck  o : no lymphadenopathy present  · Musculoskeletal  o General  o :   § General Musculoskeletal  § : Tenderness in the lumbar region. No joint swelling or deformity. Muscle tone, strength, and development grossly normal.  · Skin and Subcutaneous Tissue  o General Inspection  o : NL tone  · Neurologic  o Gait and Station  o :   § Gait Screening  § : normal gait  · Psychiatric  o Mood and Affect  o : mood normal, affect appropriate              Assessment  · Lumbago     724.2/M54.5    Problems Reconciled  Plan  · Orders  o ACO-17: Screened for tobacco use AND received tobacco cessation intervention (4004F) - - 06/03/2020  o ACO-39: Current medications updated and reviewed () - - 06/03/2020  o Solu-Medrol Injection 40mg (-3) - 724.2/M54.5 - 06/03/2020   Injection - Solu-Medrol 40mg; Dose: 1mL; Site: Left Gluteus; Route: intramuscular; Date: 06/03/2020 16:43:04; Exp: 04/30/2021; Lot: [id number]; Mfg: [employer]; TradeName: methylprednisolone sod suc(PF); Location: [facility]; Administered By: Kallie Meza MA; Comment: NDC-[id number]  · Medications  o prednisone 5 mg oral tablets,dose pack   SIG: take as directed for 6 days   DISP: (1) 21 ct blist pack with 0 refills  Prescribed on 06/03/2020     o Medications have been Reconciled  o Transition of Care or Provider Policy  · Instructions  o Patient was educated/instructed on their diagnosis, treatment and medications prior to discharge from the clinic today.  · Disposition  o Follow up as needed.            Electronically Signed by: SVETLANA Pantoja -Author on June 4, 2020 12:50:34 PM

## 2021-05-07 NOTE — PROGRESS NOTES
Progress Note      Patient Name: Jeronimo Phillips   Patient ID: [id number]   Sex: Male   YOB: 1965        Visit Date: August 8, 2019    Provider: ZACKARY Gay   Location: [facility]   Location Address: [address] [place] [address] [address] [place]  [address]   Location Phone: [phone]          Chief Complaint     PATIENT IS HERE FOR LOWER BACK PAIN AND THE PAIN IS GOING DOWN BOTH LEGS.      HE JUST FOUND OUT TODAY HE HAS A TEAR IN HIS RIGHT KNEE FROM A MRI AND HE IS GOING TO SEE DR BEAN FOR THAT. OVER AT THE [facility] TOLD HIM.     THEY USUALLY GIVE HIM A INJECTION FOR HIS LOWER BACK.       History Of Present Illness  Jeronimo Phillips is a 54 year old /White male who presents for evaluation and treatment of:      pt here for the LBP and then the pt reports the pain radiates to the BLE and hips and normally a steroid sot helps--not had on in 8 months--    pt felt a small knot to the top of the crack of buttocks nontender and no head and been approx. couple months       Past Medical History  Disease Name Date Onset Notes   Back pain --  --    Benign essential hypertension --  --    BPH without urinary obstruction --  --    GERD (gastroesophageal reflux disease) --  --    Heart Disease --  --    High cholesterol --  --    Hypothyroidism --  --    Sleep apnea --  --          Past Surgical History  Procedure Name Date Notes   Appendectomy --  --    Gallbladder removal --  --    Stent placment last 2007 Patient reports 5 stent placement surgeries         Medication List  Name Date Started Instructions   amlodipine 10 mg oral tablet  take 1 tablet (10 mg) by oral route once daily   Bystolic 5 mg oral tablet  take 1 tablet (5 mg) by oral route once daily   Crestor 40 mg oral tablet  take 1 tablet (40 mg) by oral route once daily   cyclobenzaprine 10 mg oral tablet  take 1 tablet by oral route 3 times a day As needed for back spasm   gabapentin 800 mg oral tablet  take 1 tablet (800  mg) by oral route 3 times per day   hydrocodone-acetaminophen 7.5-325 mg oral tablet  --    Levoxyl 100 mcg oral tablet  take 2 tablets by oral route daily   Medrol (Mehdi) 4 mg oral tablets,dose pack 08/08/2019 take as directed for 6 days   nabumetone 750 mg oral tablet  take 2 tablets (1,500 mg) by oral route once daily   Nexium 40 mg oral capsule,delayed release(DR/EC)  take 1 capsule (40 mg) by oral route once daily   Singulair 10 mg oral tablet 02/08/2018 take 1 tablet (10 mg) by oral route once daily in the evening for 30 days   Zyrtec 10 mg oral tablet 02/08/2018 take 1 tablet (10 mg) by oral route once daily for 30 days         Allergy List  Allergen Name Date Reaction Notes   Bactrim DS --  rash --    Plavix --  rash --          Social History  Finding Status Start/Stop Quantity Notes   Tobacco Current every day 20/-- 1 pack started in 1985         Immunizations  NameDate Admin Mfg Trade Name Lot Number Route Inj VIS Given VIS Publication   HepA02/07/2019 [facility] HAVRIX-ADULT f4el2 IM RD 02/07/2019 07/20/2016   Comments: NDC: [id number]   HepA05/17/2018 [facility] HAVRIX-ADULT   RD 05/17/2018 07/20/2016   Comments: NDC [id number]         Review of Systems  · Constitutional  o Denies  o : fatigue, fever  · HENT  o Denies  o : vertigo, recent head injury  · Cardiovascular  o Denies  o : chest pain, irregular heart beats  · Respiratory  o Denies  o : shortness of breath, productive cough  · Gastrointestinal  o Denies  o : nausea, vomiting, abdominal pain, fecal incontinence  · Genitourinary  o Denies  o : dysuria, incontinence, urinary retention  · Integument  o Denies  o : hair growth change, new skin lesions  · Neurologic  o Denies  o : tingling or numbness  · Musculoskeletal  o Admits  o : back pain, hip pain, knee pain  · Endocrine  o Denies  o : cold intolerance, heat intolerance  · Heme-Lymph  o Denies  o : petechiae, lymph node enlargement or tenderness  · Allergic-Immunologic  o Denies  o : frequent  "illnesses      Vitals  Date Time BP Position Site L\R Cuff Size HR RR TEMP (F) WT  HT  BMI kg/m2 BSA m2 O2 Sat HC       08/08/2019 04:16 /68 Sitting    91 - R  98.2 274lbs 2oz 5'  9\" 40.48 2.46 94 %          Physical Examination  · Constitutional  o Appearance  o : well developed, well-nourished, in no acute distress  · Head and Face  o HEENT  o : Unremarkable  · Eyes  o Conjunctivae  o : conjunctivae normal  · Neck  o Inspection/Palpation  o : supple  o Thyroid  o : no thyromegaly  · Respiratory  o Respiratory Effort  o : breathing unlabored  o Auscultation of Lungs  o : clear to ascultation  · Cardiovascular  o Heart  o :   § Auscultation of Heart  § : regular rate and rhythm  o Peripheral Vascular System  o :   § Extremities  § : no edema  · Gastrointestinal  o Abdominal Examination  o :   § Abdomen  § : soft  · Lymphatic  o Neck  o : no lymphadenopathy present  · Musculoskeletal  o General  o :   § General Musculoskeletal  § : (+) lumbar pain tenderness to the lower aspect and then the SI bilat --radiates to the BLE --to the base of the coccyx area more on the right side--at the top of the buttocks junction--nontender and cannot see it--but to palpation there s a firm circular peasized lipoma   · Skin and Subcutaneous Tissue  o General Inspection  o : skin turgor normal, texture normal  · Neurologic  o Gait and Station  o :   § Gait Screening  § : limps slightly with the right knee   · Psychiatric  o Mood and Affect  o : mood normal, affect appropriate          Assessment  · Lumbago     724.2/M54.5  · Low back pain with sciatica     724.3/M54.40  · DDD (degenerative disc disease), lumbar     722.52/M51.36  · Lipoma     214.9/D17.9      Plan  · Orders  o ACO-39: Current medications updated and reviewed () - - 08/08/2019  o Solu-Medrol Injection 40mg (-3) - 724.3/M54.40, 722.52/M51.36, 724.2/M54.5 - 08/08/2019   Injection - Solu-Medrol 40mg; Dose: 1mL; Site: Left Gluteus; Route: intramuscular; " Date: 08/08/2019 16:55:45; Exp: 01/01/2019; Lot: [id number]; Mfg: [employer]; TradeName: methylprednisolone sod suc(PF); Location: [facility]; Administered By: Emily Musa MA; Comment: NDC [phone]  o IM - Injection Fee ([id number]) - 724.3/M54.40, 722.52/M51.36, 724.2/M54.5 - 08/08/2019  · Medications  o Medrol (Mehdi) 4 mg oral tablets,dose pack   SIG: take as directed for 6 days   DISP: (1) 21 ct dose-pack with 0 refills  Adjusted on 08/08/2019     · Instructions  o Take all medications as prescribed/directed.  o Patient was educated/instructed on their diagnosis, treatment and medications prior to discharge from the clinic today.  o Call the office with any concerns or questions.  o Chronic conditions reviewed and taken into consideration for today's treatment plan.  · Disposition  o Call or Return if symptoms worsen or persist.     monitor the lipoma--or the cyst-and then if changes or starts to bother him --will require workup    advised pt to let Dr Billy know that he did get a steroid injection today and was placed on steroids as well --once he sees him-             Electronically Signed by: ZACKARY Gay -Author on August 8, 2019 05:19:14 PM

## 2021-05-07 NOTE — PROGRESS NOTES
Progress Note      Patient Name: Jeronimo Phillips   Patient ID: [id number]   Sex: Male   YOB: 1965        Visit Date: December 4, 2018    Provider: SVETLANA Pantoja   Location: [facility]   Location Address: [address] [place] [address]  [id number]   Location Phone: [phone]          Chief Complaint     back pain that radiates down his legs, he wants a steroid shot       History Of Present Illness  Jeronimo Phillips is a 53 year old /White male who presents for evaluation and treatment of:      low back pain with radiation down the legs x 1 week - no known injury - pt has recurrent low back pain - no change in activity or heavy lifting - no loss of bowel or bladder control - pt states the last time this occurred he couldn't hardly walk due to the pain     Review of sticky note reveals pt has had 2 elevated glucose levels over the past 2 years       Past Medical History  Disease Name Date Onset Notes   Back pain --  --    Benign essential hypertension --  --    BPH without urinary obstruction --  --    GERD (gastroesophageal reflux disease) --  --    Heart Disease --  --    High cholesterol --  --    Hypothyroidism --  --    Sleep apnea --  --          Past Surgical History  Procedure Name Date Notes   Appendectomy --  --    Gallbladder removal --  --    Stent placment last 2007 Patient reports 5 stent placement surgeries         Medication List  Name Date Started Instructions   amlodipine 10 mg oral tablet  take 1 tablet (10 mg) by oral route once daily   Bystolic 5 mg oral tablet  take 1 tablet (5 mg) by oral route once daily   Crestor 40 mg oral tablet  take 1 tablet (40 mg) by oral route once daily   cyclobenzaprine 10 mg oral tablet  take 1 tablet by oral route 3 times a day As needed for back spasm   gabapentin 800 mg oral tablet  take 1 tablet (800 mg) by oral route 3 times per day   hydrocodone-acetaminophen 7.5-325 mg oral tablet  --    Levoxyl 100 mcg  "oral tablet  take 2 tablets by oral route daily   nabumetone 750 mg oral tablet  take 2 tablets (1,500 mg) by oral route once daily   Nexium 40 mg oral capsule,delayed release(DR/EC)  take 1 capsule (40 mg) by oral route once daily   Singulair 10 mg oral tablet 02/08/2018 take 1 tablet (10 mg) by oral route once daily in the evening for 30 days   Zyrtec 10 mg oral tablet 02/08/2018 take 1 tablet (10 mg) by oral route once daily for 30 days         Allergy List  Allergen Name Date Reaction Notes   Bactrim DS --  rash --    Plavix --  rash --          Social History  Finding Status Start/Stop Quantity Notes   Tobacco Current every day 20/-- 1 pack started in 1985         Immunizations  NameDate Admin Mfg Trade Name Lot Number Route Inj VIS Given VIS Publication   HepA05/17/2018 [facility] [employer] Adult  IM RD 05/17/2018 07/20/2016   Comments: NDC [id number]         Review of Systems  · Constitutional  o Denies  o : fever, chills, body aches  · Cardiovascular  o Denies  o : chest pain, palpitations  · Gastrointestinal  o Denies  o : nausea, vomiting  · Genitourinary  o Denies  o : nocturia, incontinence  · Musculoskeletal  o * See HPI  · Endocrine  o Admits  o : polydipsia  o Denies  o : polyuria, excessive thirst or urination      Vitals  Date Time BP Position Site L\R Cuff Size HR RR TEMP(F) WT  HT  BMI kg/m2 BSA m2 O2 Sat        12/04/2018 04:49 /78 Sitting    83 - R  97.9 286lbs 8oz 5'  9\" 42.31 2.52 92 %           Physical Examination  · Constitutional  o Appearance  o : well developed, well-nourished, in no acute distress  · Eyes  o Conjunctivae  o : conjunctivae normal  o Pupils and Irises  o : pupils equal and round, pupils reactive to light bilaterally  · Neck  o Inspection/Palpation  o : supple  o Thyroid  o : no thyromegaly  · Respiratory  o Respiratory Effort  o : breathing unlabored  o Auscultation of Lungs  o : clear to ascultation  · Cardiovascular  o Heart  o :   § Auscultation of Heart  § : " regular rate and rhythm  o Peripheral Vascular System  o :   § Extremities  § : no edema  · Lymphatic  o Neck  o : no lymphadenopathy present  · Musculoskeletal  o General  o :   § General Musculoskeletal  § : NL ROM - non-tender to palpation - increased muscle tension of bilateral gluteal muscles. No joint swelling or deformity. Muscle strength, and development grossly normal.  · Skin and Subcutaneous Tissue  o General Inspection  o : NL tone  · Neurologic  o Gait and Station  o :   § Gait Screening  § : normal gait  · Psychiatric  o Mood and Affect  o : mood normal, affect appropriate              Assessment  · Elevated glucose level     790.29/R73.09  · Low back pain with sciatica     724.3/M54.40      Plan  · Orders  o CMP [facility] (70499) - 790.29/R73.09 - 12/04/2018  o Hgb A1c [facility] (20075) - 790.29/R73.09 - 12/04/2018  o ACO-17: Screened for tobacco use AND received tobacco cessation intervention (4004F) - - 12/04/2018  o ACO-39: Current medications updated and reviewed () - - 12/04/2018  o Depo-Medrol 80 () - 724.3/M54.40 - 12/04/2018   Injection - Depo-Medrol 80mg; Dose: 1mL; Site: Left Gluteus; Route: intramuscular; Date: 12/04/2018 17:20:04; Exp: 08/31/2018; Lot: [id number]; Mfg: [employer]/UP[employer]HN; TradeName: methylprednisolone acetate; Location: [facility]; Administered By: Emily Musa MA; Comment: NDC [phone]  · Medications  o Medrol (Mehdi) 4 mg oral tablets,dose pack   SIG: take as directed   DISP: (1) 21 ct dose-pack with 0 refills  Prescribed on 12/04/2018     · Instructions  o Take all medications as prescribed/directed.  o Patient was educated/instructed on their diagnosis, treatment and medications prior to discharge from the clinic today.  o Recommend stretching  · Disposition  o Follow up as needed.            Electronically Signed by: SVETLANA Pantoja -Author on December 4, 2018 05:39:46 PM

## 2021-05-07 NOTE — PROGRESS NOTES
Progress Note      Patient Name: Jeronimo Phillips   Patient ID: [id number]   Sex: Male   YOB: 1965        Visit Date: February 8, 2018    Provider: SVETLANA Pantoja   Location: [facility]   Location Address: [address] [place] [address]  [id number]   Location Phone: [phone]          Chief Complaint     chest congestion, cough, and some nausea, x 1 month, [facility], it helped but came back worse       History Of Present Illness  Jeronimo Phillips is a 52 year old /White male who presents for evaluation and treatment of:      cough and congestion x 1 month - had milder symptoms about one week ago and then states symptoms worsened over the past week - has been taking Mucinex DM - non-productive cough,       Past Medical History  Disease Name Date Onset Notes   Back pain --  --    Benign essential hypertension --  --    BPH without urinary obstruction --  --    GERD (gastroesophageal reflux disease) --  --    Heart Disease --  --    High cholesterol --  --    Hypothyroidism --  --    Sleep apnea --  --          Past Surgical History  Procedure Name Date Notes   Appendectomy --  --    Gallbladder removal --  --    Stent placment last 2007 Patient reports 5 stent placement surgeries         Medication List  Name Date Started Instructions   amlodipine 10 mg oral tablet  take 1 tablet (10 mg) by oral route once daily   Bystolic 5 mg oral tablet  take 1 tablet (5 mg) by oral route once daily   Crestor 40 mg oral tablet  take 1 tablet (40 mg) by oral route once daily   cyclobenzaprine 10 mg oral tablet  take 1 tablet by oral route 3 times a day As needed for back spasm   gabapentin 800 mg oral tablet  take 1 tablet (800 mg) by oral route 3 times per day   hydrocodone-acetaminophen 7.5-325 mg oral tablet  --    Levoxyl 100 mcg oral tablet  take 2 tablets by oral route daily   nabumetone 750 mg oral tablet  take 2 tablets (1,500  "mg) by oral route once daily   Nexium 40 mg oral capsule,delayed release(DR/EC)  take 1 capsule (40 mg) by oral route once daily         Allergy List  Allergen Name Date Reaction Notes   Bactrim DS --  rash --    Plavix --  rash --          Social History  Finding Status Start/Stop Quantity Notes   Tobacco Current every day 20/-- 1 pack started in 1985         Review of Systems  · Constitutional  o Denies  o : fever, chills, body aches  · HENT  o Admits  o : nasal discharge, neck pain  o Denies  o : sinus pain, nasal congestion, sore throat  · Cardiovascular  o Denies  o : chest pain, palpitations  · Respiratory  o Admits  o : shortness of breath, wheezing, cough  o Denies  o : productive cough  · Gastrointestinal  o Admits  o : nausea  o Denies  o : vomiting      Vitals  Date Time BP Position Site L\R Cuff Size HR RR TEMP(F) WT  HT  BMI kg/m2 BSA m2 O2 Sat HC       02/08/2018 02:17 /78 Sitting    78 - R  98.4 278lbs 4oz 5'  9\" 41.09 2.48 97 %           Physical Examination  · Constitutional  o Appearance  o : well developed, well-nourished, in no acute distress  · Eyes  o Conjunctivae  o : conjunctivae normal  o Pupils and Irises  o : pupils equal and round, pupils reactive to light bilaterally  · Ears, Nose, Mouth and Throat  o Ears  o :   § External Ears  § : no auricle tenderness to palpation present  § Otoscopic Examination  § : tympanic membrane appearance within normal limits bilaterally  § Hearing  § : response to sound normal, no tinnitus  o Nose  o :   § Intranasal Exam  § : nasal mucosa inflammation present   o Throat  o :   § Oropharynx  § : no inflammation or lesions present  · Neck  o Inspection/Palpation  o : supple  o Thyroid  o : no thyromegaly  · Respiratory  o Respiratory Effort  o : breathing unlabored  o Auscultation of Lungs  o : clear to auscultation; deep coarse cough  · Cardiovascular  o Heart  o :   § Auscultation of Heart  § : regular rate and rhythm  o Peripheral Vascular " System  o :   § Extremities  § : no edema  · Lymphatic  o Neck  o : no lymphadenopathy present  · Neurologic  o Gait and Station  o :   § Gait Screening  § : normal gait  · Psychiatric  o Mood and Affect  o : mood normal, affect appropriate              Assessment  · Bronchitis, acute     466.0/J20.9  · Cough     786.2/R05      Plan  · Orders  o Chest xray 2 views [facility] ([id number]) - 786.2/R05 - 02/08/2018   NAD  o ACO-39: Current medications updated and reviewed () - - 02/08/2018  o ACO-18: Negative screen for clinical depression using a standardized tool () - - 02/08/2018   PHQ-2 verbal questionnaire negative.   o PFT Basic [facility] ([id number]) - 786.2/R05 - 02/08/2018   Normal Spirometry  · Medications  o Singulair 10 mg oral tablet   SIG: take 1 tablet (10 mg) by oral route once daily in the evening for 30 days   DISP: (30) tablets with 0 refills  Prescribed on 02/08/2018     o Zyrtec 10 mg oral tablet   SIG: take 1 tablet (10 mg) by oral route once daily for 30 days   DISP: (30) tablets with 0 refills  Prescribed on 02/08/2018     o azithromycin 250 mg oral tablet   SIG: take 2 tablets (500 mg) by oral route once daily for 1 day then 1 tablet (250 mg) by oral route once daily for 4 days   DISP: (6) tablets with 0 refills  Prescribed on 02/08/2018     o prednisone 5 mg oral tablets,dose pack   SIG: take as directed   DISP: (1) 21 ct dose-pack with 0 refills  Prescribed on 02/08/2018     · Instructions  o Take all medications as prescribed/directed.  o Patient was educated/instructed on their diagnosis, treatment and medications prior to discharge from the clinic today.  o Treat as bronchitis and allergic rhinitis.   o Follow up with continued cough and chest congestion.   · Disposition  o Call or Return if symptoms worsen or persist.            Electronically Signed by: SVETLANA Pantoja -Author on February 8, 2018 03:48:14 PM

## 2021-05-07 NOTE — PROGRESS NOTES
"   Progress Note      Patient Name: Jeronimo Phillips   Patient ID: [id number]   Sex: Male   YOB: 1965        Visit Date: June 4, 2018    Provider: ZACKARY Gay   Location: [facility]   Location Address: [address] [place] [address]  [id number]   Location Phone: [phone]          Chief Complaint     PATIENT IS HERE WITH LEG PAIN MOSTLY IN THE RIGHT LEG, PAIN STARTS IN THE WAIST LINE AREA AND GOES DOWN.  BEEN HURTING FOR ABOUT 3 WEEKS AND IT IS NOT GETTING ANY BETTER.  HURTS MORE WHEN HE IS STANDING AND SOME LAYING DOWN.       History Of Present Illness  Jeronimo Phillips is a 53 year old /White male who presents for evaluation and treatment of:      1 yr ago     then also takes the muscle relaxer and gabapentin as well--    pt states saw Anya Henson in March and states that's when al this started worsening--  pt was given a steroid shot and pred dee and although the shot helped--  pt also states normally helps and normally goes back to baseline--and then his regular meds will cover the pain--    but pt here today and concerned could be something else--and would lie testing--bloodwork--    pt has a c-scope scheduled next month     still smoking 1 PPD started approx. 30 yrs ago-\">pt states has something going on with the pain tot he area of the back at the waistline and radiates into the legs--hurts worse with standing and walking--    takes hydrocodone for neck and back--has a lower disc that acts up at times--and only at evening when at home--    and also using the heating pad--    describes it as a sharp pain and like someone stabbing him--    pt had prior cervical spine surgery per DR Leal--    pt in pain mgt--    and pt also on Relafen as well     they did 3 epidurals and just did not help--    last MRI > 1 yr ago     then also takes the muscle relaxer and gabapentin as well--    pt states saw Anya Henson in March and states that's when al this " started worsening--  pt was given a steroid shot and pred dee and although the shot helped--  pt also states normally helps and normally goes back to baseline--and then his regular meds will cover the pain--    but pt here today and concerned could be something else--and would lie testing--bloodwork--    pt has a c-scope scheduled next month     still smoking 1 PPD started approx. 30 yrs ago-       Past Medical History  Disease Name Date Onset Notes   Back pain --  --    Benign essential hypertension --  --    BPH without urinary obstruction --  --    GERD (gastroesophageal reflux disease) --  --    Heart Disease --  --    High cholesterol --  --    Hypothyroidism --  --    Sleep apnea --  --          Past Surgical History  Procedure Name Date Notes   Appendectomy --  --    Gallbladder removal --  --    Stent placment last 2007 Patient reports 5 stent placement surgeries         Medication List  Name Date Started Instructions   amlodipine 10 mg oral tablet  take 1 tablet (10 mg) by oral route once daily   Bystolic 5 mg oral tablet  take 1 tablet (5 mg) by oral route once daily   Crestor 40 mg oral tablet  take 1 tablet (40 mg) by oral route once daily   cyclobenzaprine 10 mg oral tablet  take 1 tablet by oral route 3 times a day As needed for back spasm   gabapentin 800 mg oral tablet  take 1 tablet (800 mg) by oral route 3 times per day   hydrocodone-acetaminophen 7.5-325 mg oral tablet  --    Levoxyl 100 mcg oral tablet  take 2 tablets by oral route daily   nabumetone 750 mg oral tablet  take 2 tablets (1,500 mg) by oral route once daily   Nexium 40 mg oral capsule,delayed release(DR/EC)  take 1 capsule (40 mg) by oral route once daily   prednisone 10 mg oral tablets,dose pack 03/15/2018 take as directed   Singulair 10 mg oral tablet 02/08/2018 take 1 tablet (10 mg) by oral route once daily in the evening for 30 days   Zyrtec 10 mg oral tablet 02/08/2018 take 1 tablet (10 mg) by oral route once daily for 30 days  "        Allergy List  Allergen Name Date Reaction Notes   Bactrim DS --  rash --    Plavix --  rash --          Social History  Finding Status Start/Stop Quantity Notes   Tobacco Current every day 20/-- 1 pack started in 1985         Immunizations  NameDate Admin Mfg Trade Name Lot Number Route Inj VIS Given VIS Publication   HepA05/17/2018 SKB Havrix Adult  IM RD 05/17/2018 07/20/2016   Comments: NDC [id number]         Review of Systems  · Constitutional  o Admits  o : fatigue  o Denies  o : fever  · HENT  o Denies  o : vertigo, recent head injury  · Cardiovascular  o Denies  o : chest pain, irregular heart beats  · Respiratory  o Denies  o : shortness of breath, productive cough  · Gastrointestinal  o Admits  o : blood in stools, hemorrhoids  o Denies  o : nausea, vomiting  · Genitourinary  o Denies  o : dysuria, urinary retention  · Integument  o Denies  o : hair growth change, new skin lesions  · Neurologic  o Admits  o : tingling or numbness  · Musculoskeletal  o Admits  o : joint pain, back pain, hip pain  · Endocrine  o Denies  o : cold intolerance, heat intolerance  · Heme-Lymph  o Denies  o : petechiae, lymph node enlargement or tenderness  · Allergic-Immunologic  o Denies  o : frequent illnesses      Vitals  Date Time BP Position Site L\R Cuff Size HR RR TEMP(F) WT  HT  BMI kg/m2 BSA m2 O2 Sat        06/04/2018 05:08 /64 Sitting    90 - R  97.7 278lbs 0oz 5'  9\" 41.05 2.48 95 %           Physical Examination  · Constitutional  o Appearance  o : well developed, well-nourished, in no acute distress  · Head and Face  o HEENT  o : Unremarkable  · Eyes  o Conjunctivae  o : conjunctivae normal  · Neck  o Inspection/Palpation  o : supple  o Thyroid  o : no thyromegaly  · Respiratory  o Respiratory Effort  o : breathing unlabored  o Auscultation of Lungs  o : clear to ascultation  · Cardiovascular  o Heart  o :   § Auscultation of Heart  § : regular rate and rhythm  o Peripheral Vascular System  o : "   § Extremities  § : no edema  · Gastrointestinal  o Abdominal Examination  o :   § Abdomen  § : soft   · Lymphatic  o Neck  o : no lymphadenopathy present  · Musculoskeletal  o General  o :   § General Musculoskeletal  § : (+) lumbar tenderness and pain and limited ROM--and then lower hips and into the buttocks and down the legs   · Skin and Subcutaneous Tissue  o General Inspection  o : skin turgor normal, texture normal  · Neurologic  o Gait and Station  o :   § Gait Screening  § : normal gait  · Psychiatric  o Mood and Affect  o : mood normal, affect appropriate              Assessment  · Tobacco abuse counseling       Tobacco abuse counseling     V65.42/Z71.6  · DDD (degenerative disc disease), lumbar     722.52/M51.36  · Lumbar radicular pain     724.4/M54.16      Plan  · Orders  o Smoking cessation counseling, 3-10 minutes [facility] ([id number]) - V65.42/Z71.6 - 06/04/2018  o ACO-17: Screened for tobacco use AND received tobacco cessation intervention (4004F) - V65.42/Z71.6 - 06/04/2018  o CBC with Auto Diff [facility] ([id number]) - 722.52/M51.36, 724.4/M54.16 - 06/04/2018  o CMP [facility] ([id number]) - 722.52/M51.36, 724.4/M54.16 - 06/04/2018  o Urinalysis with Reflex Microscopy if abnormal ([facility]) ([id number]) - 724.4/M54.16 - 06/04/2018  o ACO-14: Influenza immunization was not administered for reasons documented () - - 06/04/2018  o ACO-16: BMI above normal range and follow-up plan is documented () - - 06/04/2018  o ACO-39: Current medications updated and reviewed () - - 06/04/2018  o Solu-Medrol Injection 40mg (-0) - 722.52/M51.36, 724.4/M54.16 - 06/04/2018  o IM - Injection Fee (31387) - 722.52/M51.36, 724.4/M54.16 - 06/04/2018  o MRI lumbar spine w/o contrast (88801) - 722.52/M51.36, 724.4/M54.16 - 06/04/2018   last MRI 2012--and pt with worsening s/s since March and the last 1 month been vastly worsening   · Medications  o prednisone 10 mg oral tablets,dose pack   SIG: take as directed for 6 days   DISP: (1) 21 ct  dose-pack with 0 refills  Adjusted on 06/04/2018     · Instructions  o Tobacco and smoking cessation counseling for more than 3 minutes was completed.  o Take all medications as prescribed/directed.  o Rest. Increase Fluids.  o Patient was educated/instructed on their diagnosis, treatment and medications prior to discharge from the clinic today.  o Patient instructed to seek medical attention urgently for new or worsening symptoms.  o Call the office with any concerns or questions.  o Chronic conditions reviewed and taken into consideration for today's treatment plan.  · Disposition  o Call or Return if symptoms worsen or persist.            Electronically Signed by: ZACKARY Gay -Author on June 4, 2018 08:22:28 PM

## 2021-05-07 NOTE — PROGRESS NOTES
Progress Note      Patient Name: Jeronimo Phillips   Patient ID: [id number]   Sex: Male   YOB: 1965        Visit Date: February 13, 2020    Provider: SVETLANA Gay   Location: [facility]   Location Address: [address] [place] [address] [place]  [id number]   Location Phone: [phone]          Chief Complaint     PATIENT IS HERE FOR A RETURN TO WORK APPOINTMENT FOR [employer] [facility].          FILLED OUT DEPRESSION SCREENING SHEET NOW  COLONOSCOPY- 2019         History Of Present Illness  Jeronimo Phillips is a 54 year old /White male who presents for evaluation and treatment of:      pt here for the RTW at [place]--and been released to full duty on 2/17/20 per ortho for the right knee surgery on 1/15/20--no restrictions given on activity nor meds            Past Medical History  Disease Name Date Onset Notes   Back pain --  --    Benign essential hypertension --  --    BPH without urinary obstruction --  --    GERD (gastroesophageal reflux disease) --  --    Heart Disease --  --    High cholesterol --  --    Hypothyroidism --  --    Sleep apnea --  --          Past Surgical History  Procedure Name Date Notes   Appendectomy --  --    Gallbladder removal --  --    Stent placment last 2007 Patient reports 5 stent placement surgeries         Medication List  Name Date Started Instructions   amlodipine 10 mg oral tablet  take 1 tablet (10 mg) by oral route once daily   Bystolic 5 mg oral tablet  take 1 tablet (5 mg) by oral route once daily   Crestor 40 mg oral tablet  take 1 tablet (40 mg) by oral route once daily   cyclobenzaprine 10 mg oral tablet  take 1 tablet by oral route 3 times a day As needed for back spasm   gabapentin 800 mg oral tablet  take 1 tablet (800 mg) by oral route 3 times per day   hydrocodone-acetaminophen 7.5-325 mg/15 mL oral solution  --    Levoxyl 100 mcg oral tablet  take 2 tablets by oral route daily   Medrol (Mehdi) 4 mg oral tablets,dose pack  "02/13/2020 take as directed for 6 days   nabumetone 750 mg oral tablet  take 2 tablets (1,500 mg) by oral route once daily   Nexium 40 mg oral capsule,delayed release(DR/EC)  --    Singulair 10 mg oral tablet 02/08/2018 take 1 tablet (10 mg) by oral route once daily in the evening for 30 days   Zyrtec 10 mg oral tablet 02/08/2018 take 1 tablet (10 mg) by oral route once daily for 30 days         Allergy List  Allergen Name Date Reaction Notes   Bactrim DS --  rash --    Plavix --  rash --        Allergies Reconciled  Social History  Finding Status Start/Stop Quantity Notes   Tobacco Current every day 20/-- 1 pack started in 1985         Immunizations  NameDate Admin Mfg Trade Name Lot Number Route Inj VIS Given VIS Publication   HepA02/07/2019 SK HAVRIX-ADULT [id number]  RD 02/07/2019 07/20/2016   Comments: NDC: [id number]   HepA05/17/2018 [facility] HAVRIX-ADULT   RD 05/17/2018 07/20/2016   Comments: NDC [id number]         Review of Systems  · Constitutional  o Denies  o : fever  · Cardiovascular  o Denies  o : chest pain, irregular heart beats  · Respiratory  o Denies  o : shortness of breath, productive cough  · Musculoskeletal  o Admits  o : muscle pain, back pain, knee pain  o Denies  o : limitation of motion  · Psychiatric  o Denies  o : suicidal ideation, homicidal ideation  · Allergic-Immunologic  o Denies  o : frequent illnesses      Vitals  Date Time BP Position Site L\R Cuff Size HR RR TEMP (F) WT  HT  BMI kg/m2 BSA m2 O2 Sat        02/13/2020 09:49 /60 Sitting    85 - R  97.6 265lbs 2oz 5'  9\" 39.15 2.42 95 %          Physical Examination  · Constitutional  o Appearance  o : well developed, well-nourished, in no acute distress  · Head and Face  o HEENT  o : Unremarkable  · Eyes  o Conjunctivae  o : conjunctivae normal  · Neck  o Inspection/Palpation  o : supple  o Thyroid  o : no thyromegaly  · Respiratory  o Respiratory Effort  o : breathing unlabored  o Auscultation of Lungs  o : clear to " ascultation  · Cardiovascular  o Heart  o :   § Auscultation of Heart  § : regular rate and rhythm  o Peripheral Vascular System  o :   § Extremities  § : no edema  · Gastrointestinal  o Abdominal Examination  o :   § Abdomen  § : soft  · Lymphatic  o Neck  o : no lymphadenopathy present  · Musculoskeletal  o General  o :   § General Musculoskeletal  § : No joint swelling or deformity., Muscle tone, strength, and development grossly normal. Full ROM of the right knee and no swelling and well healed incision sites--no redness---then full ROM of the back --  · Skin and Subcutaneous Tissue  o General Inspection  o : skin turgor normal, texture normal  · Neurologic  o Gait and Station  o :   § Gait Screening  § : normal gait  · Psychiatric  o Mood and Affect  o : mood normal, affect appropriate          Assessment  · Return to work exam     V72.85/Z76.89  · History of right knee surgery     V15.29/Z98.890      Plan  · Orders  o ACO-39: Current medications updated and reviewed () - - 02/13/2020  · Medications  o Medrol (Mehdi) 4 mg oral tablets,dose pack   SIG: take as directed for 6 days   DISP: (1) 21 ct dose-pack with 0 refills  Discontinued on 02/18/2020     o Medications have been Reconciled  o Transition of Care or Provider Policy  · Instructions  o Patient was educated/instructed on their diagnosis, treatment and medications prior to discharge from the clinic today.  o Call the office with any concerns or questions.  · Disposition  o Call or Return if symptoms worsen or persist.     filled out the paperwork to RTW             Electronically Signed by: Eleni Mesa APRN -Author on February 18, 2020 06:06:06 PM

## 2021-05-07 NOTE — PROGRESS NOTES
Progress Note      Patient Name: Jeronimo Phillips   Patient ID: [id number]   Sex: Male   YOB: 1965        Visit Date: March 15, 2018    Provider: SVETLANA Pantoja   Location: [facility]   Location Address: [address] [place] [address]  [id number]   Location Phone: [phone]          Chief Complaint     Wants injection for back pain       History Of Present Illness  Jeronimo Phillips is a 52 year old /White male who presents for evaluation and treatment of:      low back pain with radiation to bilateral hips - hx of protruding disc - will have intermittent worsening of his back pain - denies recent injury - pt states in the past he has had a steroid injection that have helped - has tried heat and ice at home and using pain relieving creams and they are helping slightly - denies loss of bowel or bladder control -   Pt is currently on Gabapentin and uses a muscle relaxer at night.       Past Medical History  Disease Name Date Onset Notes   Back pain --  --    Benign essential hypertension --  --    BPH without urinary obstruction --  --    GERD (gastroesophageal reflux disease) --  --    Heart Disease --  --    High cholesterol --  --    Hypothyroidism --  --    Sleep apnea --  --          Past Surgical History  Procedure Name Date Notes   Appendectomy --  --    Gallbladder removal --  --    Stent placment last 2007 Patient reports 5 stent placement surgeries         Medication List  Name Date Started Instructions   amlodipine 10 mg oral tablet  take 1 tablet (10 mg) by oral route once daily   Bystolic 5 mg oral tablet  take 1 tablet (5 mg) by oral route once daily   Crestor 40 mg oral tablet  take 1 tablet (40 mg) by oral route once daily   cyclobenzaprine 10 mg oral tablet  take 1 tablet by oral route 3 times a day As needed for back spasm   gabapentin 800 mg oral tablet  take 1 tablet (800 mg) by oral route 3 times per day   hydrocodone-acetaminophen 7.5-325 mg  oral tablet  --    Levoxyl 100 mcg oral tablet  take 2 tablets by oral route daily   nabumetone 750 mg oral tablet  take 2 tablets (1,500 mg) by oral route once daily   Nexium 40 mg oral capsule,delayed release(DR/EC)  take 1 capsule (40 mg) by oral route once daily   Singulair 10 mg oral tablet 02/08/2018 take 1 tablet (10 mg) by oral route once daily in the evening for 30 days   Zyrtec 10 mg oral tablet 02/08/2018 take 1 tablet (10 mg) by oral route once daily for 30 days         Allergy List  Allergen Name Date Reaction Notes   Bactrim DS --  rash --    Plavix --  rash --          Social History  Finding Status Start/Stop Quantity Notes   Tobacco Current every day 20/-- 1 pack started in 1985         Review of Systems  · Musculoskeletal  o Admits  o : back pain      Vitals  Date Time BP Position Site L\R Cuff Size HR RR TEMP(F) WT  HT  BMI kg/m2 BSA m2 O2 Sat HC       03/15/2018 03:05 /80 Sitting    95 - R  97.6 282lbs 0oz    97 %           Physical Examination  · Constitutional  o Appearance  o : well developed, well-nourished, in no acute distress  · Eyes  o Conjunctivae  o : conjunctivae normal  o Pupils and Irises  o : pupils equal and round, pupils reactive to light bilaterally  · Neck  o Inspection/Palpation  o : supple  o Thyroid  o : no thyromegaly  · Respiratory  o Respiratory Effort  o : breathing unlabored  o Auscultation of Lungs  o : clear to ascultation  · Cardiovascular  o Heart  o :   § Auscultation of Heart  § : regular rate and rhythm  o Peripheral Vascular System  o :   § Extremities  § : no edema  · Lymphatic  o Neck  o : no lymphadenopathy present  · Musculoskeletal  o General  o :   § General Musculoskeletal  § : Muscle tone, strength, and development grossly normal. non tender to palpate spine. hips nontender to palpation.  · Skin and Subcutaneous Tissue  o General Inspection  o : NL tone  · Neurologic  o Gait and Station  o :   § Gait Screening  § : normal gait  · Psychiatric  o Mood  and Affect  o : mood normal, affect appropriate              Assessment  · Lumbago     724.2/M54.5      Plan  · Orders  o ACO-39: Current medications updated and reviewed () - - 03/15/2018  o Solu-Medrol Injection 40mg (-3) - 724.2/M54.5 - 03/15/2018  · Medications  o prednisone 10 mg oral tablets,dose pack   SIG: take as directed   DISP: (1) 21 ct dose-pack with 0 refills  Prescribed on 03/15/2018     · Instructions  o Take all medications as prescribed/directed.  o Patient was educated/instructed on their diagnosis, treatment and medications prior to discharge from the clinic today.  o Start steroid dose pack tomorrow. Follow up as needed.   · Disposition  o Call or Return if symptoms worsen or persist.            Electronically Signed by: SVETLANA Pantoja -Author on March 15, 2018 04:19:57 PM

## 2021-05-09 VITALS
SYSTOLIC BLOOD PRESSURE: 122 MMHG | HEART RATE: 85 BPM | DIASTOLIC BLOOD PRESSURE: 60 MMHG | OXYGEN SATURATION: 95 % | BODY MASS INDEX: 39.27 KG/M2 | TEMPERATURE: 97.6 F | HEIGHT: 69 IN | WEIGHT: 265.12 LBS

## 2021-05-09 VITALS
SYSTOLIC BLOOD PRESSURE: 122 MMHG | TEMPERATURE: 98.4 F | WEIGHT: 278.25 LBS | BODY MASS INDEX: 41.21 KG/M2 | DIASTOLIC BLOOD PRESSURE: 78 MMHG | HEART RATE: 78 BPM | OXYGEN SATURATION: 97 % | HEIGHT: 69 IN

## 2021-05-09 VITALS
HEART RATE: 99 BPM | HEIGHT: 69 IN | WEIGHT: 272.5 LBS | DIASTOLIC BLOOD PRESSURE: 76 MMHG | BODY MASS INDEX: 40.36 KG/M2 | OXYGEN SATURATION: 96 % | SYSTOLIC BLOOD PRESSURE: 120 MMHG | TEMPERATURE: 99 F

## 2021-05-09 VITALS
HEART RATE: 83 BPM | OXYGEN SATURATION: 92 % | DIASTOLIC BLOOD PRESSURE: 78 MMHG | TEMPERATURE: 97.9 F | SYSTOLIC BLOOD PRESSURE: 120 MMHG | BODY MASS INDEX: 42.43 KG/M2 | HEIGHT: 69 IN | WEIGHT: 286.5 LBS

## 2021-05-09 VITALS
OXYGEN SATURATION: 97 % | TEMPERATURE: 97.6 F | SYSTOLIC BLOOD PRESSURE: 142 MMHG | DIASTOLIC BLOOD PRESSURE: 80 MMHG | HEART RATE: 95 BPM | WEIGHT: 282 LBS

## 2021-05-09 VITALS
OXYGEN SATURATION: 95 % | HEIGHT: 69 IN | DIASTOLIC BLOOD PRESSURE: 64 MMHG | HEART RATE: 90 BPM | TEMPERATURE: 97.7 F | SYSTOLIC BLOOD PRESSURE: 130 MMHG | WEIGHT: 278 LBS | BODY MASS INDEX: 41.18 KG/M2

## 2021-05-09 VITALS
TEMPERATURE: 98.2 F | HEIGHT: 69 IN | DIASTOLIC BLOOD PRESSURE: 68 MMHG | HEART RATE: 91 BPM | SYSTOLIC BLOOD PRESSURE: 130 MMHG | BODY MASS INDEX: 40.6 KG/M2 | WEIGHT: 274.12 LBS | OXYGEN SATURATION: 94 %

## 2021-05-10 NOTE — H&P
History and Physical      Patient Name: Jeronimo Phillips   Patient ID: [id number]   Sex: Male   YOB: 1965    Primary Care Provider: Danny Mccartney   Referring Provider: Ran Walsh MD    Visit Date: August 6, 2020    Provider: Eugene Kenny MD   Location: [facility]   Location Address: [address] [place] [address]  [id number]   Location Phone: [phone]          Chief Complaint     LUE pain numbness tingling and weakness       History Of Present Illness  Jeronimo Phillips is a 55 year old /White male who presents today to [facility] today referred from Ran Walsh MD.      55-year-old man evaluated for left arm pain, numbness and weakness.  Symptoms started 3 weeks ago.  He states that the first thing he had was pain in his left scapular area as well as his chest area that radiated down his arm.  The entire chest was involved.  It was anteriorly as well.  The entire left arm was involved with pain.  She saw neurosurgery and was complaining of pain in the distribution of C7 and C8.  He states that 2 days later started having numbness in the entire left arm and then weakness as well in the entire left arm.  He also has weakness in the right arm but not as extensive as the left arm.  He had an MRI of the cervical spine showing multiple levels of neuroforaminal stenosis.  He has had chronic neck pain and has had surgical procedure performed in the past.       Past Medical History  Arthritis; Back Pain; BPH (benign prostatic hyperplasia); Cervical disc degeneration; Cervical radiculitis; Cervical spinal stenosis; Cervical strain; Chest pain; Degenerative cervical disc; Diabetes; ED (erectile dysfunction) of organic origin; Elevated cholesterol; h/o Arthritis; Headache; Heart Attack; Heart disease; HTN (hypertension); Hyperlipemia; Hyperlipidemia; Hypertension; Hypogonadism male; Hypothyroidism; Limb Swelling; Lumb / LS disc degeneration; Meatal Stenosis;  MI (myocardial infarction); Neck pain; Neck pain; Reflux; Seasonal allergies; Shortness of Breath; Sleep Apnea; Thyroid Disease; Thyroid disorder         Past Surgical History  Bowel resection; cardiac stents; Cervical Laminectomy; Cholecystectomy; Colonoscopy; Cystoscopy; Gallbladder; heart surgery; Inguinal Hernia Repair; Joint Surgery; Knee repair; Neck; Stomach surgery; Vasectomy         Medication List  amlodipine 10 mg oral tablet; aspirin 81 mg oral tablet,delayed release (DR/EC); Bystolic 5 mg oral tablet; Crestor 40 mg oral tablet; cyclobenzaprine 10 mg oral tablet; gabapentin 800 mg oral tablet; levothyroxine 200 mcg Oral Tablet; lisinopril 5 mg oral tablet; metformin 500 mg oral tablet; nabumetone 750 mg oral tablet; Nexium 40 mg oral capsule,delayed release(DR/EC); oxycodone-acetaminophen 7.5-325 mg oral tablet; prednisone 20 mg oral tablet; testosterone cypionate 200 mg/mL intramuscular oil; Vitamin D2 50,000 unit oral capsule; Vitamin D3 1,000 unit oral tablet         Allergy List  Bactrim; Bactrim DS; Plavix         Family Medical History  Heart Disease; Diabetes, unspecified type; Hypertension; Heart Attack (MI)         Social History  Alcohol (Current some day); Alcohol Use (Current some day); ; lives with spouse; .; Recreational Drug Use (Never); Tobacco (Current every day); Working         Review of Systems  · Constitutional  o Denies  o : chills, excessive sweating, fatigue, fever, sycope/passing out, weight gain, weight loss  · Eyes  o Denies  o : changes in vision, blurry vision, double vision  · HENT  o Denies  o : loss of hearing, ringing in the ears, ear aches, sore throat, nasal congestion, sinus pain, nose bleeds, seasonal allergies  · Cardiovascular  o Denies  o : blood clots, swollen legs, anemia, easy burising or bleeding, transfusions  · Respiratory  o Denies  o : shortness of breath, dry cough, productive cough, pneumonia, COPD  · Gastrointestinal  o Denies  o :  "difficulty swallowing, reflux  · Genitourinary  o Denies  o : incontinence  · Neurologic  o Admits  o : difficulty with sleep, numbness/tingling/paresthesia , difficulty with coordination, weakness  o Denies  o : headache, seizure, stroke, tremor, loss of balance, falls, dizziness/vertigo, difficulty with dexterity  · Musculoskeletal  o Admits  o : neck stiffness/pain, muscle aches, weakness, pain radiating in arm, low back pain  o Denies  o : swollen lymph nodes, joint pain, spasms, sciatica, pain radiating in leg  · Endocrine  o Denies  o : diabetes, thyroid disorder  · Psychiatric  o Denies  o : anxiety, depression      Vitals  Date Time BP Position Site L\R Cuff Size HR RR TEMP (F) WT  HT  BMI kg/m2 BSA m2 O2 Sat HC       08/06/2020 12:49 /85 Sitting    81 - R 18 98 252lbs 16oz 5'  9\" 37.36 2.36           Physical Examination     He is alert, fluent, phasic, follows commands well.  There is 4/5 strength in the left infraspinatus, supraspinatus, deltoids, biceps, triceps, pronators, supinators, extensors and flexors of the wrist and fingers and intrinsic hand muscles.  On the right upper extremity there is 4/5 strength of the supraspinatus, infraspinatus, deltoids, 4-/5 strength of the right biceps, and no weakness of the triceps, pronators, supinators extensors and flexors of the wrist and intrinsic hand muscles.  Reflexes are absent in the biceps, triceps, patellar's and ankles.  There is no sensory level in the upper extremities or his back and neck.           Assessment  · Numbness and tingling       Anesthesia of skin     782.0/R20.0  Paresthesia of skin     782.0/R20.2  This nerve conduction study is borderline and is nondiagnostic for brachial plexopathy.  · Brachial neuritis of both upper extremities     723.4/M54.10  Clinically he has brachial neuritis of both upper extremities. His symptomatology cannot be explained on cervical radiculopathy based on the significant extensive involvement of C5-T1 " ventral nerve roots. An EMG study will be performed tomorrow to complete the testing.    30 minutes was spent for this low complexity new patient encounter and more than half the time was spent face-to-face with the patient examination, counseling, planning and recommendations.    Problems Reconciled  Plan  · Orders  o Nerve conduction studies; 13 or more studies (75590) - 723.4/M54.10, 782.0/R20.0, 782.0/R20.2 - 08/06/2020  · Medications  o Medications have been Reconciled  o Transition of Care or Provider Policy  · Instructions  o Encouraged to follow-up with Primary Care Provider for preventative care.            Electronically Signed by: Eugene Kenny MD -Author on August 6, 2020 06:14:59 PM

## 2021-05-13 NOTE — PROGRESS NOTES
Progress Note      Patient Name: Jeronimo Phillips   Patient ID: [id number]   Sex: Male   YOB: 1965    Primary Care Provider: Danny Mccartney   Referring Provider: Ran Walsh MD    Visit Date: July 30, 2020    Provider: Adri Negron PA-C   Location: [facility]   Location Address: [address] [place] [address]  [id number]   Location Phone: [phone]          Chief Complaint  · Neck pain      History Of Present Illness  The patient is a 55 year old /White male, who presents on referral from Emily Mckeon MD, for a neurosurgical evaluation for a history of neck pain and left arm pain. He's left arm dominant.   The left arm pain is currently severe and localized to the C7 and C8 distribution. The neck pain is localized to the posterior and left lateral cervical region and has been present for several years but left arm pain is severe over the past week. It is severe (7-8/10) and radiates into the left C7 and C8 distribution. The pain is described as being constant and it is generally worse at night. He is having difficulty with sleep due to the pain. The patient states the pain is aggravated by head in neutral position and tilting head to the left. He Improves some with neck flexion.   The onset of the symptoms was not associated with any specific event or activity.   He has no additional symptoms. The patient's past medical history is notable for prior cervical spine surgery. The patient underwent laminectomy with cervical decompression at C3-4, C4-5, and C5-6 approximately 5 years ago by Dr. Leal.   RECENT INTERVENTIONS:  He reports undergoing recent physical therapy. The physical therapy started this week and had two visits.   INFORMATION REVIEWED:  No additional information was available for review.      MRI cervical spine scheduled for August 6, 2020.    Left arm feels weak.       Past Medical History  Arthritis; Back Pain; BPH (benign prostatic hyperplasia);  Cervical disc degeneration; Cervical radiculitis; Cervical spinal stenosis; Cervical strain; Chest pain; Degenerative cervical disc; Diabetes; ED (erectile dysfunction) of organic origin; Elevated cholesterol; h/o Arthritis; Headache; Heart Attack; Heart disease; HTN (hypertension); Hyperlipemia; Hyperlipidemia; Hypertension; Hypogonadism male; Hypothyroidism; Limb Swelling; Lumb / LS disc degeneration; Meatal Stenosis; MI (myocardial infarction); Neck pain; Neck pain; Reflux; Seasonal allergies; Shortness of Breath; Sleep Apnea; Thyroid Disease; Thyroid disorder         Past Surgical History  Bowel resection; cardiac stents; Cervical Laminectomy; Cholecystectomy; Colonoscopy; Cystoscopy; Gallbladder; heart surgery; Inguinal Hernia Repair; Joint Surgery; Knee repair; Neck; Stomach surgery; Vasectomy         Medication List  amlodipine 10 mg oral tablet; aspirin 81 mg oral tablet,delayed release (DR/EC); Bystolic 5 mg oral tablet; Crestor 40 mg oral tablet; cyclobenzaprine 10 mg oral tablet; gabapentin 800 mg oral tablet; levothyroxine 200 mcg Oral Tablet; lisinopril 5 mg oral tablet; metformin 500 mg oral tablet; nabumetone 750 mg oral tablet; Nexium 40 mg oral capsule,delayed release(DR/EC); oxycodone-acetaminophen 7.5-325 mg oral tablet; sildenafil (antihypertensive) 20 mg oral tablet; testosterone cypionate 200 mg/mL intramuscular oil; Vitamin D2 50,000 unit oral capsule; Vitamin D3 1,000 unit oral tablet         Allergy List  Bactrim; Bactrim DS; Plavix       Allergies Reconciled  Family Medical History  Heart Disease; Diabetes, unspecified type; Hypertension; Heart Attack (MI)         Social History  Alcohol (Current some day); Alcohol Use (Current some day); ; lives with spouse; .; Recreational Drug Use (Never); Tobacco (Current every day); Working         Review of Systems  · Constitutional  o Admits  o : excessive sweating  o Denies  o : chills, fatigue, fever, sycope/passing out, weight gain,  "weight loss  · Eyes  o Denies  o : changes in vision, blurry vision, double vision  · HENT  o Admits  o : seasonal allergies  o Denies  o : loss of hearing, ringing in the ears, ear aches, sore throat, nasal congestion, sinus pain, nose bleeds  · Cardiovascular  o Denies  o : blood clots, swollen legs, anemia, easy burising or bleeding, transfusions  · Respiratory  o Denies  o : shortness of breath, dry cough, productive cough, pneumonia, COPD  · Gastrointestinal  o Denies  o : difficulty swallowing, reflux  · Genitourinary  o Admits  o : erectile dysfunction  o Denies  o : incontinence  · Neurologic  o Admits  o : loss of balance, difficulty with sleep, numbness/tingling/paresthesia   o Denies  o : headache, seizure, stroke, tremor, falls, dizziness/vertigo, difficulty with coordination, difficulty with dexterity, weakness  · Musculoskeletal  o Admits  o : neck stiffness/pain, muscle aches, joint pain, weakness, pain radiating in arm, pain radiating in leg, low back pain  o Denies  o : swollen lymph nodes, spasms, sciatica  · Endocrine  o Admits  o : diabetes  o Denies  o : thyroid disorder  · Psychiatric  o Denies  o : anxiety, depression  · All Others Negative      Vitals  Date Time BP Position Site L\R Cuff Size HR RR TEMP (F) WT  HT  BMI kg/m2 BSA m2 O2 Sat        07/30/2020 03:02 PM        97.6 261lbs 6oz 5'  9\" 38.6 2.4           Physical Examination  · Constitutional  o Appearance  o : well-nourished, well developed, alert, in no acute distress  · Neck  o Inspection/Palpation  o : normal appearance, no masses or tenderness, trachea midline  o Range of Motion  o : cervical range of motion within normal limits  · Respiratory  o Respiratory Effort  o : breathing unlabored  · Cardiovascular  o Peripheral Vascular System  o :   § Extremities  § : no edema or cyanosis  · Musculoskeletal  o Spine  o :   § Stability  § : no subluxations present  § Muscle Strength/Tone  § : paraspinal muscle strength within normal " limits, paraspinal muscle tone within normal limits  o Right Upper Extremity  o :   § Inspection/Palpation  § : no tenderness to palpation  § Joint Stability  § : shoulder, elbow and wrist joint stability normal  § Range of Motion  § : range of motion normal, no joint crepitus or pain with motion present,shoulder negative  o Left Upper Extremity  o :   § Inspection/Palpation  § : no tenderness to palpation  § Joint Stability  § : shoulder, elbow and wrist joint stability normal  § Range of Motion  § : range of motion normal, no joint crepitus present, no pain with joint motion, shoulder negative  · Skin and Subcutaneous Tissue  o Neck  o : no lesions or areas of discoloration  · Neurologic  o Mental Status Examination  o :   § Orientation  § : alert and oriented to person, place, time and events  o Motor Examination  o :   § RUE Strength  § : strength normal  § RUE Motor Function  § : tone normal, muscle bulk normal  § LUE Strength  § : strength normal except  and tricep are 4+/5  § LUE Motor Function  § : tone normal, muscle bulk normal  o Reflexes  o :   § RUE  § : 1/4 in biceps/triceps/brachioradialis, Laird sign negative  § LUE  § : 1/4 in biceps/triceps/brachioradialis, Laird sign negative  o Sensation  o :   § Light Touch  § : sensation intact to light touch in extremities  o Gait and Station  o :   § Gait Screening  § : normal gait, able to stand without difficulty  · Psychiatric  o Mood and Affect  o : mood normal, affect appropriate          Assessment  · Cervicalgia     723.1/M54.2  · Cervical radiculopathy     723.4/M54.12  · Cervical disc degeneration     722.4/M50.30  · Cervical disc disorder     722.91/M50.90  C6/7 left and C7/T1 right paracentral disc protrusions  · Cervical spinal stenosis     723.0/M48.02  Stable with laminectomy. Signal change preop.    Problems Reconciled  Plan  · Medications  o Medications have been Reconciled  o Transition of Care or Provider  Policy  · Instructions  o Encouraged to follow-up with Primary Care Provider for preventative care.  o The ROS and the PFSH were reviewed at today's visit.  o Call or return if symptoms worsen or persist.  o I will see him back after MRI cervical spine (scheduled for August 6th) and discuss results. Off work for the next four weeks.             Electronically Signed by: ANGELA McgowanC -Author on July 30, 2020 04:03:19 PM

## 2021-05-13 NOTE — PROGRESS NOTES
"   Progress Note      Patient Name: Jeronimo Phillips   Patient ID: [id number]   Sex: Male   YOB: 1965    Primary Care Provider: Danny Mccartney   Referring Provider: Ran Walsh MD    Visit Date: December 1, 2020    Provider: Adri Negron PA-C   Location: [facility]   Location Address: [address] [place] [address]  [id number]   Location Phone: [phone]          Chief Complaint     Follow up from surgery       History Of Present Illness     Here for 8 week post op visit s/p a right L3/4 MID with MIL.  Having some pain in the left knee and foot.  The right leg is still improving.  Here to discuss RTW status.  As of now have him off until December 8th.       Past Medical History  Arthritis; Back Pain; BPH (benign prostatic hyperplasia); Cervical disc degeneration; Cervical radiculitis; Cervical spinal stenosis; Cervical strain; Chest pain; Degenerative cervical disc; Diabetes; ED (erectile dysfunction) of organic origin; Elevated cholesterol; h/o Arthritis; Headache; Heart Attack; Heart disease; HTN (hypertension); Hyperlipemia; Hyperlipidemia; Hypertension; Hypogonadism male; Hypothyroidism; Limb Swelling; Lumb / LS disc degeneration; Meatal Stenosis; MI (myocardial infarction); Neck pain; Neck pain; Reflux; Seasonal allergies; Shortness of Breath; Sleep apnea; Thyroid Disease; Thyroid disorder         Past Surgical History  Bowel resection; cardiac stents; Cervical Laminectomy; Cholecystectomy; Colonoscopy; Cystoscopy; Gallbladder; heart surgery; Inguinal Hernia Repair; Joint Surgery; Knee repair; Minimally invasive Discectomy; Neck; Stomach surgery; Vasectomy         Medication List  amlodipine 10 mg oral tablet; aspirin 81 mg oral tablet,delayed release (DR/EC); BD Luer-Kimberly Syringe 3 mL 25 gauge x 1\" miscellaneous syringe; Bystolic 5 mg oral tablet; Crestor 40 mg oral tablet; cyclobenzaprine 10 mg oral tablet; gabapentin 800 mg oral tablet; glipizide 5 mg " oral tablet; levothyroxine 200 mcg Oral Tablet; lisinopril 5 mg oral tablet; metformin 500 mg oral tablet; nabumetone 750 mg oral tablet; Nexium 40 mg oral capsule,delayed release(DR/EC); oxycodone-acetaminophen 7.5-325 mg oral tablet; testosterone cypionate 200 mg/mL intramuscular oil; Vitamin D2 50,000 unit oral capsule; Vitamin D3 1,000 unit oral tablet         Allergy List  Bactrim; Bactrim DS; Plavix       Allergies Reconciled  Family Medical History  Heart Disease; Diabetes, unspecified type; Hypertension; Heart Attack (MI)         Social History  Alcohol (Current some day); ; lives with spouse; .; Recreational Drug Use (Never); Tobacco (Current every day); Working         Review of Systems  · Constitutional  o Denies  o : chills, excessive sweating, fatigue, fever, sycope/passing out, weight gain, weight loss  · Eyes  o Admits  o : changes in vision  o Denies  o : blurry vision, double vision  · HENT  o Admits  o : loss of hearing, seasonal allergies  o Denies  o : ringing in the ears, ear aches, sore throat, nasal congestion, sinus pain, nose bleeds  · Cardiovascular  o Admits  o : swollen legs  o Denies  o : blood clots, anemia, easy burising or bleeding, transfusions  · Respiratory  o Denies  o : shortness of breath, dry cough, productive cough, pneumonia, COPD  · Gastrointestinal  o Denies  o : difficulty swallowing, reflux  · Genitourinary  o Admits  o : incontinence  · Neurologic  o Admits  o : loss of balance, falls, dizziness/vertigo, difficulty with sleep, numbness/tingling/paresthesia , difficulty with coordination, difficulty with dexterity, weakness  o Denies  o : headache, seizure, stroke, tremor  · Musculoskeletal  o Admits  o : neck stiffness/pain, muscle aches, joint pain, weakness, pain radiating in arm, pain radiating in leg, low back pain  o Denies  o : swollen lymph nodes, spasms, sciatica  · Endocrine  o Admits  o : diabetes  o Denies  o : thyroid  "disorder  · Psychiatric  o Denies  o : anxiety, depression  · All Others Negative      Vitals  Date Time BP Position Site L\R Cuff Size HR RR TEMP (F) WT  HT  BMI kg/m2 BSA m2 O2 Sat FR L/min FiO2 HC       12/01/2020 03:29 PM        97.5 271lbs 0oz 5'  9\" 40.02 2.45             Physical Examination     Gait and station are wnl.           Assessment  · Lumbago/low back pain     724.3/M54.40  · Lumbar disc herniation, L3-4     722.10/M51.26  now s/p MID with MIL on the right       Plan  · Medications  o Medications have been Reconciled  o Transition of Care or Provider Policy  · Instructions  o I will have him RTW on 12/8/20 without restrictions. F/U here as needed.   · Associate Tasks  o Task ID [id number] General Task: update [employer] with note from today and work note            Electronically Signed by: Adri Negron PA-C -Author on December 1, 2020 03:42:28 PM  "

## 2021-05-13 NOTE — PROGRESS NOTES
Progress Note      Patient Name: Jeronimo Phillips   Patient ID: [id number]   Sex: Male   YOB: 1965    Primary Care Provider: Danny Mccartney   Referring Provider: Ran Walsh MD    Visit Date: August 4, 2020    Provider: Adri Negron PA-C   Location: [facility]   Location Address: [address] [place] [address]  [id number]   Location Phone: [phone]          Chief Complaint  · Follow up- Neck Pain      History Of Present Illness     Patient here today to discuss new MRI Cervical Spine @ [facility] which showed facet hypertrophy at multiple levels causing varying degrees of foraminal stenosis.  Central canal is patent and no acute disc herniation. He's had prior posterior cervical laminectomy.  He continues to have left scapular pain and pain into the left arm and hand.  Difficulty with  strength on the left and left small finger won't complete extend.  Takes Percocet and gabapentin and finishing prednisone at this time.  Still has pain. Left wrist splint helps. Left hand dominant.       Past Medical History  Arthritis; Back Pain; BPH (benign prostatic hyperplasia); Cervical disc degeneration; Cervical radiculitis; Cervical spinal stenosis; Cervical strain; Chest pain; Degenerative cervical disc; Diabetes; ED (erectile dysfunction) of organic origin; Elevated cholesterol; h/o Arthritis; Headache; Heart Attack; Heart disease; HTN (hypertension); Hyperlipemia; Hyperlipidemia; Hypertension; Hypogonadism male; Hypothyroidism; Limb Swelling; Lumb / LS disc degeneration; Meatal Stenosis; MI (myocardial infarction); Neck pain; Neck pain; Reflux; Seasonal allergies; Shortness of Breath; Sleep Apnea; Thyroid Disease; Thyroid disorder         Past Surgical History  Bowel resection; cardiac stents; Cervical Laminectomy; Cholecystectomy; Colonoscopy; Cystoscopy; Gallbladder; heart surgery; Inguinal Hernia Repair; Joint Surgery; Knee repair; Neck; Stomach surgery; Vasectomy          Medication List  amlodipine 10 mg oral tablet; aspirin 81 mg oral tablet,delayed release (DR/EC); Bystolic 5 mg oral tablet; Crestor 40 mg oral tablet; cyclobenzaprine 10 mg oral tablet; gabapentin 800 mg oral tablet; levothyroxine 200 mcg Oral Tablet; lisinopril 5 mg oral tablet; metformin 500 mg oral tablet; nabumetone 750 mg oral tablet; Nexium 40 mg oral capsule,delayed release(DR/EC); oxycodone-acetaminophen 7.5-325 mg oral tablet; prednisone 20 mg oral tablet; testosterone cypionate 200 mg/mL intramuscular oil; Vitamin D2 50,000 unit oral capsule; Vitamin D3 1,000 unit oral tablet         Allergy List  Bactrim; Bactrim DS; Plavix       Allergies Reconciled  Family Medical History  Heart Disease; Diabetes, unspecified type; Hypertension; Heart Attack (MI)         Social History  Alcohol (Current some day); Alcohol Use (Current some day); ; lives with spouse; .; Recreational Drug Use (Never); Tobacco (Current every day); Working         Review of Systems  · Constitutional  o Admits  o : excessive sweating  o Denies  o : chills, fatigue, fever, sycope/passing out, weight gain, weight loss  · Eyes  o Admits  o : changes in vision  o Denies  o : blurry vision, double vision  · HENT  o Admits  o : loss of hearing, seasonal allergies  o Denies  o : ringing in the ears, ear aches, sore throat, nasal congestion, sinus pain, nose bleeds  · Cardiovascular  o Denies  o : blood clots, swollen legs, anemia, easy burising or bleeding, transfusions  · Respiratory  o Denies  o : shortness of breath, dry cough, productive cough, pneumonia, COPD  · Gastrointestinal  o Denies  o : difficulty swallowing, reflux  · Genitourinary  o Admits  o : erectile dysfunction  o Denies  o : incontinence  · Neurologic  o Admits  o : numbness/tingling/paresthesia , weakness  o Denies  o : headache, seizure, stroke, tremor, loss of balance, falls, dizziness/vertigo, difficulty with sleep, difficulty with coordination,  "difficulty with dexterity  · Musculoskeletal  o Admits  o : neck stiffness/pain, muscle aches, joint pain, pain radiating in arm, low back pain  o Denies  o : swollen lymph nodes, weakness, spasms, sciatica, pain radiating in leg  · Endocrine  o Admits  o : diabetes  o Denies  o : thyroid disorder  · Psychiatric  o Denies  o : anxiety, depression  · All Others Negative      Vitals  Date Time BP Position Site L\R Cuff Size HR RR TEMP (F) WT  HT  BMI kg/m2 BSA m2 O2 Sat        08/04/2020 12:56 /74 Sitting    79 - R  97.8 256lbs 1oz 5'  9\" 37.81 2.38           Physical Examination  · Constitutional  o Appearance  o : well-nourished, well developed, alert, in no acute distress  · Neck  o Inspection/Palpation  o : normal appearance, no masses or tenderness, trachea midline  o Range of Motion  o : cervical range of motion within normal limits  · Respiratory  o Respiratory Effort  o : breathing unlabored  · Cardiovascular  o Peripheral Vascular System  o :   § Extremities  § : no edema or cyanosis  · Musculoskeletal  o Spine  o :   § Stability  § : no subluxations present  § Muscle Strength/Tone  § : paraspinal muscle strength within normal limits, paraspinal muscle tone within normal limits  o Right Upper Extremity  o :   § Inspection/Palpation  § : no tenderness to palpation  § Joint Stability  § : shoulder, elbow and wrist joint stability normal  § Range of Motion  § : range of motion normal, no joint crepitus or pain with motion present,shoulder negative  o Left Upper Extremity  o :   § Inspection/Palpation  § : no tenderness to palpation  § Joint Stability  § : shoulder, elbow and wrist joint stability normal  § Range of Motion  § : range of motion normal, no joint crepitus present, no pain with joint motion, shoulder negative  · Skin and Subcutaneous Tissue  o Neck  o : no lesions or areas of discoloration  · Neurologic  o Mental Status Examination  o :   § Orientation  § : alert and oriented to person, " place, time and events  o Motor Examination  o :   § RUE Strength  § : strength normal  § RUE Motor Function  § : tone normal, muscle bulk normal  § LUE Strength  § : strength normal except  and tricep are 4+/5  § LUE Motor Function  § : tone normal, muscle bulk normal except left small finger won't completely extend  o Reflexes  o :   § RUE  § : 1/4 in biceps/triceps/brachioradialis, Laird sign negative  § LUE  § : 1/4 in biceps/triceps/brachioradialis, Laird sign negative  o Sensation  o :   § Light Touch  § : sensation intact to light touch in extremities  o Gait and Station  o :   § Gait Screening  § : normal gait, able to stand without difficulty  · Psychiatric  o Mood and Affect  o : mood normal, affect appropriate          Assessment  · Cervicalgia     723.1/M54.2  · Cervical radiculopathy     723.4/M54.12  · Cervical disc degeneration     722.4/M50.30  · Cervical disc disorder     722.91/M50.90  C6/7 left and C7/T1 right paracentral disc protrusions  · Cervical spinal stenosis     723.0/M48.02  Stable with laminectomy. Signal change preop.  · Trigger finger     727.03/M65.30  left small finger    Problems Reconciled  Plan  · Orders  o EMG-Single Limb (Electromyography) (73033) - 723.4/M54.12, 723.1/M54.2 - 08/04/2020   concern for carpal tunnel   LUE only  o NCV (Nerve Conduction Velocities) F-wave (25254) - 723.4/M54.12, 723.1/M54.2 - 08/04/2020   concern for carpal tunnel   LUE only  · Medications  o Medications have been Reconciled  o Transition of Care or Provider Policy  · Instructions  o I will order EMG/NCV and f/u to discuss results. He will discuss his pain medication with pain management.             Electronically Signed by: Adri Negorn PA-C -Author on August 4, 2020 01:44:18 PM

## 2021-05-13 NOTE — PROGRESS NOTES
"   Progress Note      Patient Name: Jeronimo Phillips   Patient ID: [id number]   Sex: Male   YOB: 1965    Primary Care Provider: Danny Mccartney   Referring Provider: Ran Walsh MD    Visit Date: November 5, 2020    Provider: Adri Negron PA-C   Location: [facility]   Location Address: [address] [place] [address]  [id number]   Location Phone: [phone]          Chief Complaint  · Follow-up      History Of Present Illness  The patient is a 55 year old /White male who is in the office for followup appointment.      Three weeks s/p a right L3/4 MID with MIL.  Pain has improved some with stronger pain medication getting from pain clinic and took prednisone.       Past Medical History  Arthritis; Back Pain; BPH (benign prostatic hyperplasia); Cervical disc degeneration; Cervical radiculitis; Cervical spinal stenosis; Cervical strain; Chest pain; Degenerative cervical disc; Diabetes; ED (erectile dysfunction) of organic origin; Elevated cholesterol; h/o Arthritis; Headache; Heart Attack; Heart disease; HTN (hypertension); Hyperlipemia; Hyperlipidemia; Hypertension; Hypogonadism male; Hypothyroidism; Limb Swelling; Lumb / LS disc degeneration; Meatal Stenosis; MI (myocardial infarction); Neck pain; Neck pain; Reflux; Seasonal allergies; Shortness of Breath; Sleep apnea; Thyroid Disease; Thyroid disorder         Past Surgical History  Bowel resection; cardiac stents; Cervical Laminectomy; Cholecystectomy; Colonoscopy; Cystoscopy; Gallbladder; heart surgery; Inguinal Hernia Repair; Joint Surgery; Knee repair; Minimally invasive Discectomy; Neck; Stomach surgery; Vasectomy         Medication List  amlodipine 10 mg oral tablet; aspirin 81 mg oral tablet,delayed release (DR/EC); BD Luer-Kimberly Syringe 3 mL 25 gauge x 1\" miscellaneous syringe; Bystolic 5 mg oral tablet; Crestor 40 mg oral tablet; cyclobenzaprine 10 mg oral tablet; gabapentin 800 mg oral tablet; " glipizide 5 mg oral tablet; levothyroxine 200 mcg Oral Tablet; lisinopril 5 mg oral tablet; metformin 500 mg oral tablet; nabumetone 750 mg oral tablet; Nexium 40 mg oral capsule,delayed release(DR/EC); oxycodone-acetaminophen 7.5-325 mg oral tablet; testosterone cypionate 200 mg/mL intramuscular oil; Vitamin D2 50,000 unit oral capsule; Vitamin D3 1,000 unit oral tablet         Allergy List  Bactrim; Bactrim DS; Plavix       Allergies Reconciled  Family Medical History  Heart Disease; Diabetes, unspecified type; Hypertension; Heart Attack (MI)         Social History  Alcohol (Current some day); ; lives with spouse; .; Recreational Drug Use (Never); Tobacco (Current every day); Working         Review of Systems  · Constitutional  o Denies  o : chills, excessive sweating, fatigue, fever, sycope/passing out, weight gain, weight loss  · Eyes  o Admits  o : changes in vision  o Denies  o : blurry vision, double vision  · HENT  o Admits  o : loss of hearing, seasonal allergies  o Denies  o : ringing in the ears, ear aches, sore throat, nasal congestion, sinus pain, nose bleeds  · Cardiovascular  o Admits  o : swollen legs  o Denies  o : blood clots, anemia, easy burising or bleeding, transfusions  · Respiratory  o Denies  o : shortness of breath, dry cough, productive cough, pneumonia, COPD  · Gastrointestinal  o Admits  o : reflux  o Denies  o : difficulty swallowing  · Genitourinary  o Admits  o : incontinence  · Neurologic  o Admits  o : loss of balance, falls, difficulty with sleep, numbness/tingling/paresthesia , difficulty with coordination, difficulty with dexterity, weakness  o Denies  o : headache, seizure, stroke, tremor, dizziness/vertigo  · Musculoskeletal  o Admits  o : neck stiffness/pain, muscle aches, joint pain, weakness, pain radiating in arm, pain radiating in leg, low back pain  o Denies  o : swollen lymph nodes  · Endocrine  o Admits  o : diabetes  o Denies  o : thyroid  "disorder  · Psychiatric  o Denies  o : anxiety, depression  · All Others Negative      Vitals  Date Time BP Position Site L\R Cuff Size HR RR TEMP (F) WT  HT  BMI kg/m2 BSA m2 O2 Sat FR L/min FiO2 HC       11/05/2020 02:32 PM        97.3 261lbs 0oz 5'  9\" 38.54 2.4             Physical Examination  · Constitutional  o Appearance  o : well-nourished, well developed, alert, in no acute distress  · Respiratory  o Respiratory Effort  o : breathing unlabored  · Cardiovascular  o Peripheral Vascular System  o :   § Extremities  § : no cyanosis, clubbing or edema  · Neurologic  o Mental Status Examination  o :   § Orientation  § : grossly oriented to person, place and time  o Motor Examination  o :   § RLE Strength  § : strength normal  § RLE Motor Function  § : tone normal, no atrophy, no abnormal movements noted  § LLE Strength  § : strength normal  § LLE Motor Function  § : tone normal, no atrophy, no abnormal movements noted  o Sensation  o :   § Light Touch  § : sensation intact to light touch in extremities  o Gait and Station  o :   § Gait Screening  § : gait within normal limits  · Psychiatric  o Mood and Affect  o : mood normal, affect appropriate     lumbar incision has healed and no signs of infection           Assessment  · Lumbago/low back pain     724.3/M54.40  · Lumbar disc herniation, L3-4     722.10/M51.26  now s/p MID with MIL on the right       Plan  · Medications  o Medications have been Reconciled  o Transition of Care or Provider Policy  · Instructions  o The ROS and the PFSH were reviewed at today's visit.  o Call or return to office if symptoms worsen or persist.   o I will keep him off work until 8 week post op point. F/U in 3 weeks. We will update his [employer], which is [employer].   · Associate Tasks  o Task ID [id number] General Task: update [employer] with today's office note and today's work note please            Electronically Signed by: ANGELA McgowanC -Author on November 5, 2020 03:05:06 PM  "

## 2021-05-13 NOTE — PROGRESS NOTES
Progress Note      Patient Name: Jeronimo Phillips   Patient ID: [id number]   Sex: Male   YOB: 1965    Primary Care Provider: Danny Mccartney   Referring Provider: Ran Walsh MD    Visit Date: November 6, 2020    Provider: Eugene Kenny MD   Location: [facility]   Location Address: [address] [place] [address]  [id number]   Location Phone: [phone]          Chief Complaint     f/u arm pain       History Of Present Illness  Jeronimo Phillips is a 55 year old /White male who presents today to [facility] today referred from Ran Walsh MD.      55-year-old man here for follow-up of his left arm weakness.  He states that is about 75% better.  He did see [facility] and had an EMG study and they agree that it could be a brachial plexopathy but cervical radiculopathy also cannot be ruled out.  He did not get any imaging studies of his left brachial plexus.  He has had recent back surgery.       Past Medical History  Arthritis; Back Pain; BPH (benign prostatic hyperplasia); Cervical disc degeneration; Cervical radiculitis; Cervical spinal stenosis; Cervical strain; Chest pain; Degenerative cervical disc; Diabetes; ED (erectile dysfunction) of organic origin; Elevated cholesterol; h/o Arthritis; Headache; Heart Attack; Heart disease; HTN (hypertension); Hyperlipemia; Hyperlipidemia; Hypertension; Hypogonadism male; Hypothyroidism; Limb Swelling; Lumb / LS disc degeneration; Meatal Stenosis; MI (myocardial infarction); Neck pain; Neck pain; Reflux; Seasonal allergies; Shortness of Breath; Sleep apnea; Thyroid Disease; Thyroid disorder         Past Surgical History  Bowel resection; cardiac stents; Cervical Laminectomy; Cholecystectomy; Colonoscopy; Cystoscopy; Gallbladder; heart surgery; Inguinal Hernia Repair; Joint Surgery; Knee repair; Minimally invasive Discectomy; Neck; Stomach surgery; Vasectomy  "        Medication List  amlodipine 10 mg oral tablet; aspirin 81 mg oral tablet,delayed release (DR/EC); BD Luer-Kimberly Syringe 3 mL 25 gauge x 1\" miscellaneous syringe; Bystolic 5 mg oral tablet; Crestor 40 mg oral tablet; cyclobenzaprine 10 mg oral tablet; gabapentin 800 mg oral tablet; glipizide 5 mg oral tablet; levothyroxine 200 mcg Oral Tablet; lisinopril 5 mg oral tablet; metformin 500 mg oral tablet; nabumetone 750 mg oral tablet; Nexium 40 mg oral capsule,delayed release(DR/EC); oxycodone-acetaminophen 7.5-325 mg oral tablet; testosterone cypionate 200 mg/mL intramuscular oil; Vitamin D2 50,000 unit oral capsule; Vitamin D3 1,000 unit oral tablet         Allergy List  Bactrim; Bactrim DS; Plavix         Family Medical History  Heart Disease; Diabetes, unspecified type; Hypertension; Heart Attack (MI)         Social History  Alcohol (Current some day); ; lives with spouse; .; Recreational Drug Use (Never); Tobacco (Current every day); Working         Review of Systems  · Constitutional  o Denies  o : chills, excessive sweating, fatigue, fever, sycope/passing out, weight gain, weight loss  · Eyes  o Denies  o : changes in vision, blurry vision, double vision  · HENT  o Denies  o : loss of hearing, ringing in the ears, ear aches, sore throat, nasal congestion, sinus pain, nose bleeds, seasonal allergies  · Cardiovascular  o Denies  o : blood clots, swollen legs, anemia, easy burising or bleeding, transfusions  · Respiratory  o Denies  o : shortness of breath, dry cough, productive cough, pneumonia, COPD  · Gastrointestinal  o Denies  o : difficulty swallowing, reflux  · Genitourinary  o Denies  o : incontinence  · Neurologic  o Admits  o : loss of balance, falls, numbness/tingling/paresthesia , weakness  o Denies  o : headache, seizure, stroke, tremor, dizziness/vertigo, difficulty with sleep, difficulty with coordination, difficulty with dexterity  · Musculoskeletal  o Admits  o : neck " "stiffness/pain, joint pain, weakness, sciatica, pain radiating in arm, pain radiating in leg, low back pain  o Denies  o : swollen lymph nodes, muscle aches, spasms  · Endocrine  o Admits  o : diabetes  o Denies  o : thyroid disorder  · Psychiatric  o Denies  o : anxiety, depression      Vitals  Date Time BP Position Site L\R Cuff Size HR RR TEMP (F) WT  HT  BMI kg/m2 BSA m2 O2 Sat FR L/min FiO2 HC       11/06/2020 12:32 PM 95/55 Sitting    85 - R 18 97.3 261lbs 0oz 5'  9\" 38.54 2.4             Physical Examination     He is alert, fluent, phasic, follows commands well.  There is no weakness of the right upper extremity excellent with muscle testing.  The left lower extremity there is 5/5 strength of the deltoids 4/5 strength of the triceps, supinator, extensors the wrist, intrinsic hand muscles and flexors of the fingers.  Reflexes are absent in the biceps, triceps, brachioradialis.           Assessment  · Brachial plexopathy     353.0/G54.0  I discussed with him that I want to do an MRI of the left brachial plexus to make sure that there is no pathology that is causing him to have weakness in his left arm since we are going to keep on waiting until this improves in time. He is agreeable to this.    15 minutes was spent for this low complexity encounter more than half the time was spent face-to-face with the patient for examination, counseling, planning recommendations.      Plan  · Medications  o Medications have been Reconciled  o Transition of Care or Provider Policy  · Instructions  o Encouraged to follow-up with Primary Care Provider for preventative care.            Electronically Signed by: Eugene Kenny MD -Author on November 6, 2020 01:28:39 PM  "

## 2021-05-13 NOTE — PROGRESS NOTES
Progress Note      Patient Name: Jeronimo Phillips   Patient ID: [id number]   Sex: Male   YOB: 1965    Primary Care Provider: Danny Mccartney   Referring Provider: Ran Walsh MD    Visit Date: September 3, 2020    Provider: Adri Negron PA-C   Location: [facility] [place] [facility]   Location Address: [address] [place] [address]  [id number]   Location Phone: [phone]          Chief Complaint  · Surgical History and Physical     Patient is being seen today to discuss MRI Lumbar that was done at [facility].       History Of Present Illness     MRI lumbar spine showed advanced degenerative changes in the endplates at L3 and L4.  There was slight concern for infectious process therefore sed rate and CRP ordered and they were both wnl.  He has a right paracentral disc protrusion at L3/4 with degenerative changes in the facet joints contributing to moderate central canal stenosis.  He has a small disc protrusion at L2/3.  Multilevel advanced degenerative changes.  He continues to have right leg pain in the anterior thigh and right lateral thigh mostly down to the knee.  He's not been able to work since June.       Past Medical History  Arthritis; Back Pain; BPH (benign prostatic hyperplasia); Cervical disc degeneration; Cervical radiculitis; Cervical spinal stenosis; Cervical strain; Chest pain; Degenerative cervical disc; Diabetes; ED (erectile dysfunction) of organic origin; Elevated cholesterol; h/o Arthritis; Headache; Heart Attack; Heart disease; HTN (hypertension); Hyperlipemia; Hyperlipidemia; Hypertension; Hypogonadism male; Hypothyroidism; Limb Swelling; Lumb / LS disc degeneration; Meatal Stenosis; MI (myocardial infarction); Neck pain; Neck pain; Reflux; Seasonal allergies; Shortness of Breath; Sleep apnea; Thyroid Disease; Thyroid disorder         Past Surgical History  Bowel resection; cardiac stents; Cervical Laminectomy; Cholecystectomy; Colonoscopy; Cystoscopy;  "Gallbladder; heart surgery; Inguinal Hernia Repair; Joint Surgery; Knee repair; Neck; Stomach surgery; Vasectomy         Medication List  amlodipine 10 mg oral tablet; aspirin 81 mg oral tablet,delayed release (DR/EC); BD Luer-Kimberly Syringe 3 mL 25 gauge x 1\" miscellaneous syringe; Bystolic 5 mg oral tablet; Crestor 40 mg oral tablet; cyclobenzaprine 10 mg oral tablet; gabapentin 800 mg oral tablet; levothyroxine 200 mcg Oral Tablet; lisinopril 5 mg oral tablet; metformin 500 mg oral tablet; nabumetone 750 mg oral tablet; Nexium 40 mg oral capsule,delayed release(DR/EC); oxycodone-acetaminophen 7.5-325 mg oral tablet; sildenafil 50 mg oral tablet; testosterone cypionate 200 mg/mL intramuscular oil; Vitamin D2 50,000 unit oral capsule; Vitamin D3 1,000 unit oral tablet         Allergy List  Bactrim; Bactrim DS; Plavix       Allergies Reconciled  Family Medical History  Heart Disease; Diabetes, unspecified type; Hypertension; Heart Attack (MI)         Social History  Alcohol (Current some day); ; lives with spouse; .; Recreational Drug Use (Never); Tobacco (Current every day); Working         Review of Systems  · Constitutional  o Denies  o : chills, excessive sweating, fatigue, fever, sycope/passing out, weight gain, weight loss  · Eyes  o Denies  o : changes in vision, blurry vision, double vision  · HENT  o Denies  o : loss of hearing, ringing in the ears, ear aches, sore throat, nasal congestion, sinus pain, nose bleeds, seasonal allergies  · Cardiovascular  o Denies  o : blood clots, swollen legs, anemia, easy burising or bleeding, transfusions  · Respiratory  o Denies  o : shortness of breath, dry cough, productive cough, pneumonia, COPD  · Gastrointestinal  o Denies  o : difficulty swallowing, reflux  · Genitourinary  o Denies  o : incontinence  · Neurologic  o Admits  o : falls  o Denies  o : headache, seizure, stroke, tremor, loss of balance, dizziness/vertigo, difficulty with sleep, " "numbness/tingling/paresthesia , difficulty with coordination, difficulty with dexterity, weakness  · Musculoskeletal  o Admits  o : low back pain, right leg pain  o Denies  o : neck stiffness/pain, swollen lymph nodes, muscle aches, joint pain, weakness, spasms  · Endocrine  o Denies  o : diabetes, thyroid disorder  · Psychiatric  o Denies  o : anxiety, depression  · All Others Negative      Vitals  Date Time BP Position Site L\R Cuff Size HR RR TEMP (F) WT  HT  BMI kg/m2 BSA m2 O2 Sat HC       09/03/2020 08:28 AM        97.3 260lbs 5oz 5'  9\" 38.44 2.4           Physical Examination     Gait and station are wnl.               Assessment  · Trigger finger     727.03/M65.30  left small finger  · Low back pain     724.2/M54.5  · Lumbar radiculopathy     724.4/M54.16  · Lumbar stenosis     724.02/M48.061  · Osteoarthritis of spine at multiple levels     721.90/M47.819  · Lumbar disc herniation     722.10/M51.26  right L3/4  · Preoperative examination     V72.84/Z01.818    Problems Reconciled  Plan  · Orders  o Covid Testing at Non-[facility] facility (83369) - V72.84/Z01.818 - 09/03/2020  · Medications  o Medications have been Reconciled  o Transition of Care or Provider Policy  · Instructions  o We discussed the option of a right L3/4 MIL with MID. Dr. Leal also saw him and discussed with him the risks and benefits of this procedure and he has elected to proceed with surgery. He is considering applying for SSD and I agree with him applying. Dr. Bustos in [place] is his cardiologist and he has cardiac stents and takes ASA 81 mg daily. He will need cardiac clearance.  o ****Surgical Orders****  o Outpatient  o RISK AND BENEFITS:  o Possible risks/complications, benefits and alternatives to surgical or invasive procedure have been explained to the patient and/or legal guardian.  o Patient has been evaluated and can tolerate anesthesia and/or sedation. Risks, benefits, and alternatives to anesthesia and sedation have been " explained to the patient and/or legal guardian.  o PREP: Per protocol;  o IV: Per Anesthesia;  o *******************************************  o PRE- OP MEDICATION ORDER:  o *******************************************  o Kefzol 2 grams IV on call to OR.  o ***************  o Date of Surgical Procedure:   o Please sign permit for:  o Right Approach Minimally Invasive Discectomy,  o Minimally Invasive Laminectomy, right approach  o lumbar three to lumbar four  o The above History and Physical must have been completed within 30 days of admission.  · Associate Tasks  o Task ID [id number] General Task: needs cardiac clearance and permission to d/c ASA for a MIL with MID one level            Electronically Signed by: Adri Negron PA-C -Author on September 3, 2020 10:57:16 AM  Electronically Co-signed by: Walter Leal MD -Reviewer on September 3, 2020 11:09:44 AM

## 2021-05-13 NOTE — PROGRESS NOTES
Progress Note      Patient Name: Jeronimo Phillips   Patient ID: [id number]   Sex: Male   YOB: 1965    Primary Care Provider: Danny Mccartney   Referring Provider: Ran Walsh MD    Visit Date: August 21, 2020    Provider: SVETLANA Cobian   Location: [facility]   Location Address: [address] [place] [address], [address] [place] [address]  [id number]   Location Phone: [phone]          Chief Complaint  · follow up       History Of Present Illness  The patient is a 55 year old /White male presents today in office to follow up on low testosterone. Patient is doing well urologically and testosterone helps with his energy and libido. Requesting refill on sildenafil He is having some issues with neuropathy in his left arm and being evaluated. Follows also with pain management for back pain and regular urine drug screens performed. Denies dysuria or gross hematuria.      12/27/19 psa 1.29  7/23/20 cmp,cbc, testosterone 676       Past Medical History  Arthritis; Back Pain; BPH (benign prostatic hyperplasia); Cervical disc degeneration; Cervical radiculitis; Cervical spinal stenosis; Cervical strain; Chest pain; Degenerative cervical disc; Diabetes; ED (erectile dysfunction) of organic origin; Elevated cholesterol; h/o Arthritis; Headache; Heart Attack; Heart disease; HTN (hypertension); Hyperlipemia; Hyperlipidemia; Hypertension; Hypogonadism male; Hypothyroidism; Limb Swelling; Lumb / LS disc degeneration; Meatal Stenosis; MI (myocardial infarction); Neck pain; Neck pain; Reflux; Seasonal allergies; Shortness of Breath; Sleep apnea; Thyroid Disease; Thyroid disorder         Past Surgical History  Bowel resection; cardiac stents; Cervical Laminectomy; Cholecystectomy; Colonoscopy; Cystoscopy; Gallbladder; heart surgery; Inguinal Hernia Repair; Joint Surgery; Knee repair; Neck; Stomach surgery; Vasectomy         Medication List  amlodipine 10 mg oral tablet; aspirin 81 mg oral tablet,delayed  release (DR/EC); Bystolic 5 mg oral tablet; Crestor 40 mg oral tablet; cyclobenzaprine 10 mg oral tablet; gabapentin 800 mg oral tablet; levothyroxine 200 mcg Oral Tablet; lisinopril 5 mg oral tablet; metformin 500 mg oral tablet; nabumetone 750 mg oral tablet; Nexium 40 mg oral capsule,delayed release(DR/EC); oxycodone-acetaminophen 7.5-325 mg oral tablet; testosterone cypionate 200 mg/mL intramuscular oil; Vitamin D2 50,000 unit oral capsule; Vitamin D3 1,000 unit oral tablet         Allergy List  Bactrim; Bactrim DS; Plavix         Family Medical History  Heart Disease; Diabetes, unspecified type; Hypertension; Heart Attack (MI)         Social History  Alcohol (Current some day); ; lives with spouse; .; Recreational Drug Use (Never); Tobacco (Current every day); Working         Review of Systems  · Constitutional  o Denies  o : fever, headache, chills  · Eyes  o Denies  o : eye pain, double vision, blurred vision  · HENT  o Denies  o : sinus problems, sore throat, ear infection  · Cardiovascular  o Denies  o : chest pain, high blood pressure, varicosities  · Respiratory  o Denies  o : shortness of breath, wheezing, frequent cough  · Gastrointestinal  o Denies  o : nausea, vomiting, heartburn, indigestion, abdominal pain  · Genitourinary  o Admits  o : decreased stream  o Denies  o : urgency, frequency, urinary retention, painful urination  · Integument  o Denies  o : rash, itching, boils  · Neurologic  o Denies  o : tremors, dizzy spells  · Musculoskeletal  o Denies  o : joint pain,   · Endocrine  o Denies  o : cold intolerance, heat intolerance, tired, excessive thirst, sluggish  · Psychiatric  o Admits  o : feels satisfied with life  o Denies  o : severe depression, concerns with hurting themselves  · Heme-Lymph  o Denies  o : swollen glands, blood clotting problems  · Allergic-Immunologic  o Denies  o : sinus allergy symptoms, hay fever      Vitals  Date Time BP Position Site L\R Cuff Size HR RR  TEMP (F) WT  HT  BMI kg/m2 BSA m2 O2 Sat HC       08/21/2020 09:11 /69 Sitting    79 - R   114lbs 5oz              Physical Examination  · Constitutional  o Appearance  o : well developed, well-nourished, no obvious deformities present  · Respiratory  o Respiratory Effort  o : breathing unlabored  o Inspection of Chest  o : normal appearance, no retractions  o Auscultation of Lungs  o : wheezing throughout.  · Cardiovascular  o Heart  o :   § Auscultation of Heart  § : regular rate and rhythm, no murmurs, gallops or rubs  o Peripheral Vascular System  o : No abnormalities  · Gastrointestinal  o Abdominal Examination  o : abdomen nontender to palpation, normal bowel sounds, tone normal without rigidity or guarding, no masses present, abdomen scaphoid upon supine  o Liver and spleen  o : no abnormalities  · Genitourinary  o Bladder  o : no abnormalities  o Penis  o : no abnormalities, circumcised  o Urethral Meatus  o : no abnormalities  o Scrotum and Scrotal Contents  o :   § Scrotum  § : no abnormalities  § Epididymides  § : no abnormalities  § Testes  § : no abnormalities  o Digital Rectal Examination  o :   § Prostate  § : nontender to palpation, size 20g, no nodules present, consistency normal  · Lymphatic  o Groin  o : no lymphadenopathy present, normal size  · Skin and Subcutaneous Tissue  o General Inspection  o : no lesions present, no areas of discoloration, skin turgor normal, texture normal  · Neurologic  o Mental Status Examination  o : grossly oriented to person, place and time  o Gait and Station  o : normal gait, able to stand without difficulty  · Psychiatric  o Mood and Affect  o : mood normal, affect appropriate      Figure 1.0: Pain Rating Scale-Navi         Results  · In-Office Procedures  o Lab procedure  § Automated dipstick urinalysis with microscopy (08451)   § Color Ur: Yellow   § Clarity Ur: Clear   § Glucose Ur Ql Strip: 500 mg/dL   § Bilirub Ur Ql Strip: Negative   § Ketones Ur Ql  Strip: Trace   § Sp Gr Ur Qn: 1.025   § Hgb Ur Ql Strip: Negative   § pH Ur-LsCnc: 6.0   § Prot Ur Ql Strip: Negative   § Urobilinogen Ur Strip-mCnc: 0.2 E.U./dL   § Nitrite Ur Ql Strip: Negative   § WBC Est Ur Ql Strip: Negative   § RBC UrnS Qn HPF: 0   § WBC UrnS Qn HPF: 0   § Bacteria UrnS Qn HPF: 0   § Crystals UrnS Qn HPF: 0   § Epithelial Cells (non renal): 0 /HPF  § Epithelial Cells (renal): 0       Assessment  · Erectile dysfunction     607.84/N52.9  · Hypogonadism, male     257.2/E29.1    Problems Reconciled  Plan  · Medications  o sildenafil 50 mg oral tablet   SIG: take 1 to 2 tablet by oral route once daily as needed approximately 1 hour before sexual activity   DISP: (30) tablets with 1 refills  Prescribed on 08/21/2020     o testosterone cypionate 200 mg/mL intramuscular oil   SIG: INJECT ONE MILLILITER INTRAMUSCULARLY EVERY TWO WEEKS   DISP: (2) Unspecified with 0 refills  Adjusted on 08/21/2020     o Medications have been Reconciled  o Transition of Care or Provider Policy     continue testosterone injection 200mg q 2 weeks. sildenafil for ed. labs reviewed. routine follow up 6 months.             Electronically Signed by: SVETLANA Cobian -Author on August 21, 2020 12:56:06 PM

## 2021-05-13 NOTE — PROGRESS NOTES
Progress Note      Patient Name: Jeronimo Phillips   Patient ID: [id number]   Sex: Male   YOB: 1965    Primary Care Provider: Danny Mccartney   Referring Provider: Ran Walsh MD    Visit Date: August 10, 2020    Provider: Eugene Kenny MD   Location: [facility]   Location Address: [address] [place] [address]  [id number]   Location Phone: [phone]          Chief Complaint     F/u numbness/ tingling, brachial neuritis of BUE.       History Of Present Illness  Jeronimo Phillips is a 55 year old /White male who presents today to [facility] today referred from Ran Walsh MD.      55-year-old man here for follow-up of his arm pain and weakness.  He was supposed to get an EMG the day after I saw him however he did not show up.  He is here for the EMG study.  There is no improvement in symptoms.  Continues to have pain in his left arm.  He states that the nerve conduction study that we did improved his pain temporarily but it came back again.  Has no new symptoms in his upper extremities or lower extremities.       Past Medical History  Arthritis; Back Pain; BPH (benign prostatic hyperplasia); Cervical disc degeneration; Cervical radiculitis; Cervical spinal stenosis; Cervical strain; Chest pain; Degenerative cervical disc; Diabetes; ED (erectile dysfunction) of organic origin; Elevated cholesterol; h/o Arthritis; Headache; Heart Attack; Heart disease; HTN (hypertension); Hyperlipemia; Hyperlipidemia; Hypertension; Hypogonadism male; Hypothyroidism; Limb Swelling; Lumb / LS disc degeneration; Meatal Stenosis; MI (myocardial infarction); Neck pain; Neck pain; Reflux; Seasonal allergies; Shortness of Breath; Sleep Apnea; Thyroid Disease; Thyroid disorder         Past Surgical History  Bowel resection; cardiac stents; Cervical Laminectomy; Cholecystectomy; Colonoscopy; Cystoscopy; Gallbladder; heart surgery; Inguinal Hernia Repair; Joint Surgery; Knee repair;  Neck; Stomach surgery; Vasectomy         Medication List  amlodipine 10 mg oral tablet; aspirin 81 mg oral tablet,delayed release (DR/EC); Bystolic 5 mg oral tablet; Crestor 40 mg oral tablet; cyclobenzaprine 10 mg oral tablet; gabapentin 800 mg oral tablet; levothyroxine 200 mcg Oral Tablet; lisinopril 5 mg oral tablet; metformin 500 mg oral tablet; nabumetone 750 mg oral tablet; Nexium 40 mg oral capsule,delayed release(DR/EC); oxycodone-acetaminophen 7.5-325 mg oral tablet; testosterone cypionate 200 mg/mL intramuscular oil; Vitamin D2 50,000 unit oral capsule; Vitamin D3 1,000 unit oral tablet         Allergy List  Bactrim; Bactrim DS; Plavix         Family Medical History  Heart Disease; Diabetes, unspecified type; Hypertension; Heart Attack (MI)         Social History  Alcohol (Current some day); ; lives with spouse; .; Recreational Drug Use (Never); Tobacco (Current every day); Working         Review of Systems  · Constitutional  o Denies  o : chills, excessive sweating, fatigue, fever, sycope/passing out, weight gain, weight loss  · Eyes  o Denies  o : changes in vision, blurry vision, double vision  · HENT  o Denies  o : loss of hearing, ringing in the ears, ear aches, sore throat, nasal congestion, sinus pain, nose bleeds, seasonal allergies  · Cardiovascular  o Denies  o : blood clots, swollen legs, anemia, easy burising or bleeding, transfusions  · Respiratory  o Denies  o : shortness of breath, dry cough, productive cough, pneumonia, COPD  · Gastrointestinal  o Denies  o : difficulty swallowing, reflux  · Genitourinary  o Denies  o : incontinence  · Neurologic  o Denies  o : headache, seizure, stroke, tremor, loss of balance, falls, dizziness/vertigo, difficulty with sleep, numbness/tingling/paresthesia , difficulty with coordination, difficulty with dexterity, weakness  · Musculoskeletal  o Denies  o : neck stiffness/pain, swollen lymph nodes, muscle aches, joint pain, weakness, spasms,  "sciatica, pain radiating in arm, pain radiating in leg, low back pain  · Endocrine  o Denies  o : diabetes, thyroid disorder  · Psychiatric  o Denies  o : anxiety, depression      Vitals  Date Time BP Position Site L\R Cuff Size HR RR TEMP (F) WT  HT  BMI kg/m2 BSA m2 O2 Sat HC       08/10/2020 09:04 /78 Sitting    72 - R   255lbs 0oz 5'  9\" 37.66 2.37           Physical Examination     There is 4/5 strength of the bilateral infraspinatus.  There is 4/5 strength of the triceps, biceps, extensors and flexors of the wrist and fingers and intrinsic hand muscles on the left side.  There is 5/5 strength of the supraspinatus bilaterally as well as the deltoids.  Right biceps and triceps is 4+/5 strength.  No distal weakness in the right arm.  Reflexes are absent in the biceps, triceps, brachioradialis.           Assessment  · Brachial neuritis of both upper extremities     723.4/M54.10  I discussed with him that I will need a second opinion from [facility] since the nerve conduction study that I did does not really fit the diagnosis of brachial neuritis and more consistent with cervical radiculopathy. Clinically however his diagnosis is brachial neuritis. I will refer him to Dr. Stratton to do an EMG nerve conduction study and help him in making the diagnosis. I will see him again in 1 month's time for follow-up.    25 minutes was spent for this moderate complexity visit more than half the time spent face-to-face with the patient for examination, counseling, planning and recommendations.    Problems Reconciled  Plan  · Orders  o NEUROLOGY CONSULTATION. (NEURO) - 723.4/M54.10 - 08/10/2020   [facility] neuromuscular clinic for EMG Dr. Stratton.  · Medications  o Medications have been Reconciled  o Transition of Care or Provider Policy  · Instructions  o Encouraged to follow-up with Primary Care Provider for preventative care.            Electronically Signed by: Eugene Kenny MD -Author on August " 10, 2020 09:30:16 AM

## 2021-05-13 NOTE — PROGRESS NOTES
Progress Note      Patient Name: Jeronimo Phillips   Patient ID: [id number]   Sex: Male   YOB: 1965    Primary Care Provider: Danny Mccartney   Referring Provider: Ran Walsh MD    Visit Date: October 20, 2020    Provider: Adri Negron PA-C   Location: [facility]   Location Address: [address] [place] [address]  [id number]   Location Phone: [phone]          Chief Complaint  · Back pain     Post-Op f/u due to worsening pain.       History Of Present Illness  The patient is a 55 year old /White male, who presents on referral from Ran Walsh MD , for a neurosurgical evaluation for post op pain. He had a right L3/4 MID with MIL 6 days ago. He started having more pain the evening of surgery. He was on Oxycodone 7.5 prior to surgery so he did not get the Oxycodone 5/325 since had the 7.5 mg at home despite taking two at a time. He denies fever or drainage from his wound. He has DMII on metformin. Using a rollator walker due to pain in back and legs. Has zoom appt. tomorrow with pain management and will call me with update. Needs [employer] forms faxed.       Past Medical History  Arthritis; Back Pain; BPH (benign prostatic hyperplasia); Cervical disc degeneration; Cervical radiculitis; Cervical spinal stenosis; Cervical strain; Chest pain; Degenerative cervical disc; Diabetes; ED (erectile dysfunction) of organic origin; Elevated cholesterol; h/o Arthritis; Headache; Heart Attack; Heart disease; HTN (hypertension); Hyperlipemia; Hyperlipidemia; Hypertension; Hypogonadism male; Hypothyroidism; Limb Swelling; Lumb / LS disc degeneration; Meatal Stenosis; MI (myocardial infarction); Neck pain; Neck pain; Reflux; Seasonal allergies; Shortness of Breath; Sleep apnea; Thyroid Disease; Thyroid disorder         Past Surgical History  Bowel resection; cardiac stents; Cervical Laminectomy; Cholecystectomy; Colonoscopy; Cystoscopy; Gallbladder; heart surgery;  "Inguinal Hernia Repair; Joint Surgery; Knee repair; Minimally invasive Discectomy; Neck; Stomach surgery; Vasectomy         Medication List  amlodipine 10 mg oral tablet; aspirin 81 mg oral tablet,delayed release (DR/EC); BD Luer-Kimberly Syringe 3 mL 25 gauge x 1\" miscellaneous syringe; Bystolic 5 mg oral tablet; Crestor 40 mg oral tablet; cyclobenzaprine 10 mg oral tablet; gabapentin 800 mg oral tablet; levothyroxine 200 mcg Oral Tablet; lisinopril 5 mg oral tablet; metformin 500 mg oral tablet; nabumetone 750 mg oral tablet; Nexium 40 mg oral capsule,delayed release(DR/EC); oxycodone-acetaminophen 7.5-325 mg oral tablet; sildenafil 50 mg oral tablet; testosterone cypionate 200 mg/mL intramuscular oil; Vitamin D2 50,000 unit oral capsule; Vitamin D3 1,000 unit oral tablet         Allergy List  Bactrim; Bactrim DS; Plavix       Allergies Reconciled  Family Medical History  Heart Disease; Diabetes, unspecified type; Hypertension; Heart Attack (MI)         Social History  Alcohol (Current some day); ; lives with spouse; .; Recreational Drug Use (Never); Tobacco (Current every day); Working         Review of Systems  · Constitutional  o Denies  o : chills, excessive sweating, fatigue, fever, sycope/passing out, weight gain, weight loss  · Eyes  o Admits  o : changes in vision  o Denies  o : blurry vision, double vision  · HENT  o Admits  o : loss of hearing, seasonal allergies  o Denies  o : ringing in the ears, ear aches, sore throat, nasal congestion, sinus pain, nose bleeds  · Cardiovascular  o Denies  o : blood clots, swollen legs, anemia, easy burising or bleeding, transfusions  · Respiratory  o Denies  o : shortness of breath, dry cough, productive cough, pneumonia, COPD  · Gastrointestinal  o Denies  o : difficulty swallowing, reflux  · Genitourinary  o Admits  o : incontinence  · Neurologic  o Admits  o : loss of balance, falls, dizziness/vertigo, numbness/tingling/paresthesia , difficulty with " "coordination, difficulty with dexterity, weakness  o Denies  o : headache, seizure, stroke, tremor, difficulty with sleep  · Musculoskeletal  o Admits  o : neck stiffness/pain, muscle aches, joint pain, weakness, pain radiating in arm, pain radiating in leg, low back pain  o Denies  o : swollen lymph nodes, spasms  · Endocrine  o Admits  o : diabetes  o Denies  o : thyroid disorder  · Psychiatric  o Denies  o : anxiety, depression  · All Others Negative      Vitals  Date Time BP Position Site L\R Cuff Size HR RR TEMP (F) WT  HT  BMI kg/m2 BSA m2 O2 Sat FR L/min FiO2 HC       10/20/2020 01:32 PM        97.6           10/20/2020 01:55 /104 Sitting    104 - R    5'  9\"               Physical Examination  · Constitutional  o Appearance  o : well-nourished, well developed, alert, in no acute distress  · Respiratory  o Respiratory Effort  o : breathing unlabored  · Cardiovascular  o Peripheral Vascular System  o :   § Extremities  § : no edema or cyanosis  · Musculoskeletal  o Spine  o :   § Inspection/Palpation  § : lumbar incision is healing well and no signs of infection  o Right Lower Extremity  o :   § Inspection/Palpation  § : no joint or limb tenderness to palpation, no edema present, no ecchymosis  § Joint Stability  § : joint stability within normal limits  § Range of Motion  § : range of motion normal, no joint crepitations present, no pain on motion, Tom's test negative  o Left Lower Extremity  o :   § Inspection/Palpation  § : no joint or limb tenderness to palpation, no edema present, no ecchymosis  § Joint Stability  § : joint stability within normal limits  § Range of Motion  § : range of motion normal, no joint crepitations present, no pain on motion, Tom's test negative  · Skin and Subcutaneous Tissue  o Extremities  o :   § Right Lower Extremity  § : no lesions or areas of discoloration  § Left Lower Extremity  § : no lesions or areas of discoloration  o Back  o : no lesions or areas of " "discoloration  · Neurologic  o Mental Status Examination  o :   § Orientation  § : alert and oriented to time, person, place and events  o Motor Examination  o :   § RLE Strength  § : strength normal  § RLE Motor Function  § : tone normal, no atrophy, no abnormal movements noted  § LLE Strength  § : strength normal  § LLE Motor Function  § : tone normal, no atrophy, no abnormal movements noted  o Reflexes  o :   § RLE  § : knee and ankle reflexes 2/4, SLR negative  § LLE  § : knee and ankle reflexes 2/4, SLR negative  o Sensation  o :   § Light Touch  § : sensation intact to light touch in extremities  o Gait and Station  o :   § Gait Screening  § : slow and using a rollator walker  · Psychiatric  o Mood and Affect  o : mood normal, affect appropriate          Assessment  · Lumbago/low back pain     724.3/M54.40  · Lumbar disc herniation, L3-4     722.10/M51.26  now s/p MID with MIL on the right   · Post-operative pain     338.18/G89.18      Plan  · Medications  o prednisone 20 mg oral tablet   SIG: take 1 tablet (20 mg) by oral route bid x 5 days then one po qd x 3 days. Take with food.8   DISP: (13) Tablet with 0 refills  Prescribed on 10/20/2020     o Medications have been Reconciled  o Transition of Care or Provider Policy  · Instructions  o Encouraged to follow-up with Primary Care Provider for preventative care.  o The ROS and the PFSH were reviewed at today's visit.  o Handouts provided: Non-Surgical Treatments for Back Pain/Degenerative Disc Disease.  o We have discussed the benefits of core strengthening. Patient will work on improving the core muscle strength with low impact activities such as walking, swimming, Pilates, etc.   o Call or return to office if symptoms worsen or persist.  o I will add prednisone and f/u in one week. If not improving will order MRI lumbar spine with and without contrast.   · Associate Tasks  o Task ID [id number] \"''Provider to Nurse: please refax [employer] forms to [employer] from the 15th " of October            Electronically Signed by: Adri Negron PA-C -Author on October 20, 2020 02:39:07 PM

## 2021-05-14 VITALS — BODY MASS INDEX: 38.55 KG/M2 | HEIGHT: 69 IN | TEMPERATURE: 97.3 F | WEIGHT: 260.31 LBS

## 2021-05-14 VITALS
HEART RATE: 85 BPM | BODY MASS INDEX: 38.66 KG/M2 | WEIGHT: 261 LBS | RESPIRATION RATE: 18 BRPM | SYSTOLIC BLOOD PRESSURE: 95 MMHG | HEIGHT: 69 IN | TEMPERATURE: 97.3 F | DIASTOLIC BLOOD PRESSURE: 55 MMHG

## 2021-05-14 VITALS — BODY MASS INDEX: 40.14 KG/M2 | WEIGHT: 271 LBS | TEMPERATURE: 97.5 F | HEIGHT: 69 IN

## 2021-05-14 VITALS — BODY MASS INDEX: 38.66 KG/M2 | TEMPERATURE: 97.3 F | HEIGHT: 69 IN | WEIGHT: 261 LBS

## 2021-05-14 VITALS
DIASTOLIC BLOOD PRESSURE: 104 MMHG | HEART RATE: 104 BPM | BODY MASS INDEX: 40.24 KG/M2 | HEIGHT: 69 IN | TEMPERATURE: 97.6 F | SYSTOLIC BLOOD PRESSURE: 128 MMHG

## 2021-05-14 VITALS
DIASTOLIC BLOOD PRESSURE: 79 MMHG | HEIGHT: 69 IN | SYSTOLIC BLOOD PRESSURE: 153 MMHG | HEART RATE: 82 BPM | BODY MASS INDEX: 40.24 KG/M2 | TEMPERATURE: 98.3 F

## 2021-05-14 NOTE — PROGRESS NOTES
"   Progress Note      Patient Name: Jreonimo Phillips   Patient ID: [id number]   Sex: Male   YOB: 1965    Primary Care Provider: Danny Mccartney   Referring Provider: Ran Walsh MD    Visit Date: February 15, 2021    Provider: SVETLANA Cobian   Location: [facility]   Location Address: [address] [place] [address], [address] [place] [address]  [id number]   Location Phone: [phone]          Chief Complaint  · follow up hypogonadism and bph      History Of Present Illness  The patient presents for follow-up of hypogonadism and bph      routine visit and denies any voiding difficulty. On testosterone injections 200mg im q 2 weeks and feels helpful with energy. Denies dysuria or gross hematuria. Sildenafil for erectile dysfunction. Follows with pain management for chronic back pain and most recent surgery this past October.       Past Medical History  Arthritis; Back Pain; BPH (benign prostatic hyperplasia); Cervical disc degeneration; Cervical radiculitis; Cervical spinal stenosis; Cervical strain; Chest pain; Degenerative cervical disc; Diabetes; ED (erectile dysfunction) of organic origin; Elevated cholesterol; h/o Arthritis; Headache; Heart Attack; Heart disease; HTN (hypertension); Hyperlipemia; Hyperlipidemia; Hypertension; Hypogonadism male; Hypothyroidism; Limb Swelling; Lumb / LS disc degeneration; Meatal Stenosis; MI (myocardial infarction); Neck pain; Neck pain; Reflux; Seasonal allergies; Shortness of Breath; Sleep apnea; Thyroid Disease; Thyroid disorder         Past Surgical History  Bowel resection; cardiac stents; Cervical Laminectomy; Cholecystectomy; Colonoscopy; Cystoscopy; Gallbladder; heart surgery; Inguinal Hernia Repair; Joint Surgery; Knee repair; Minimally invasive Discectomy; Neck; Stomach surgery; Vasectomy         Medication List  amlodipine 10 mg oral tablet; aspirin 81 mg oral tablet,delayed release (DR/EC); BD Luer-Kimberly Syringe 3 mL 25 gauge x 1\" miscellaneous syringe; " "Bystolic 5 mg oral tablet; Crestor 40 mg oral tablet; cyclobenzaprine 10 mg oral tablet; gabapentin 800 mg oral tablet; glipizide 5 mg oral tablet; levothyroxine 200 mcg Oral Tablet; lisinopril 5 mg oral tablet; metformin 500 mg oral tablet; nabumetone 750 mg oral tablet; Nexium 40 mg oral capsule,delayed release(DR/EC); oxycodone-acetaminophen 7.5-325 mg oral tablet; testosterone cypionate 200 mg/mL intramuscular oil; Vitamin D2 50,000 unit oral capsule; Vitamin D3 1,000 unit oral tablet         Allergy List  Bactrim; Bactrim DS; Plavix       Allergies Reconciled  Family Medical History  Heart Disease; Diabetes, unspecified type; Hypertension; Heart Attack (MI)         Social History  Alcohol (Current some day); ; lives with spouse; .; Recreational Drug Use (Never); Tobacco (Current every day); Working         Review of Systems  · Constitutional  o Denies  o : fever, chills  · Eyes  o Denies  o : double vision, cataracts  · Cardiovascular  o Denies  o : chest pain at rest, chest pain with exercise, irregular heart beats, palpitations, leg cramps with exercise  · Respiratory  o Denies  o : shortness of breath, wheezing,   · Gastrointestinal  o Admits  o : constipation  · Genitourinary  o Admits  o : additional symptoms as noted in HPI  o Denies  o : frequency urgency retention  · Neurologic  o Denies  o : seizures  · Musculoskeletal  o Admits  o : chronic back pain  · Endocrine  o Denies  o : hot flashes, thyroid disorders  · Psychiatric  o Denies  o : depression, schizophrenia, bipolar disorder  · Heme-Lymph  o Denies  o : easy bleeding, easy bruising, sickle cell disease or trait, lymph node enlargement or tenderness      Vitals  Date Time BP Position Site L\R Cuff Size HR RR TEMP (F) WT  HT  BMI kg/m2 BSA m2 O2 Sat FR L/min FiO2 HC       02/15/2021 01:23 /79 Sitting    82 - R  98.3  5'  9\"               Physical Examination  · Constitutional  o Appearance  o : obese, well developed patient in " no acute distress. Ambulating without difficulty.  · Respiratory  o Respiratory Effort  o : breathing unlabored  o Inspection of Chest  o : normal appearance, no retractions  o Auscultation of Lungs  o : normal breath sounds except rales right lower lobe  · Cardiovascular  o Heart  o :   § Auscultation of Heart  § : regular rate and rhythm, no murmurs, gallops or rubs  · Gastrointestinal  o Liver and spleen  o : no abnormalities  o Hernias  o : absent   · Genitourinary  o Bladder  o : no abnormalities  o Penis  o : no abnormalities, circumcised  o Urethral Meatus  o : small  o Scrotum and Scrotal Contents  o :   § Scrotum  § : no abnormalities  § Epididymides  § : no abnormalities  § Testes  § : no abnormalities  o Digital Rectal Examination  o :   § Prostate  § : nontender to palpation, size 25, no nodules present, consistency normal  · Lymphatic  o Groin  o : no lymphadenopathy present, normal size  · Neurologic  o Mental Status Examination  o : grossly oriented to person, place and time  o Gait and Station  o : normal gait, able to stand without difficulty  · Psychiatric  o Mood and Affect  o : mood normal, affect appropriate          Results  · In-Office Procedures  o Lab procedure  § Automated dipstick urinalysis with microscopy (27391)   § Color Ur: Yellow   § Clarity Ur: Clear   § Glucose Ur Ql Strip: Negative   § Bilirub Ur Ql Strip: Negative   § Ketones Ur Ql Strip: Negative   § Sp Gr Ur Qn: 1.020   § Hgb Ur Ql Strip: Negative   § pH Ur-LsCnc: 6.0   § Prot Ur Ql Strip: Negative   § Urobilinogen Ur Strip-mCnc: 0.2 E.U./dL   § Nitrite Ur Ql Strip: Negative   § WBC Est Ur Ql Strip: Negative   § RBC UrnS Qn HPF: 0   § WBC UrnS Qn HPF: 0   § Bacteria UrnS Qn HPF: 0   § Crystals UrnS Qn HPF: 0   § Epithelial Cells (non renal): 0 /HPF  § Epithelial Cells (renal): 0       Assessment  · Erectile dysfunction     607.84/N52.9  · Hypogonadism male     257.2/E29.1  · Prostate Cancer  Screening     V76.44/Z12.5  · BPH     600.00      Plan  · Orders  o Comprehensive metabolic panel this panel must include the follow (85966) - 600.00, 607.84/N52.9, 257.2/E29.1, V76.44/Z12.5 - 02/15/2021  o Blood count; complete (CBC), automated (Hgb, Hct, RBC, WBC and p (88023) - 600.00, 607.84/N52.9, 257.2/E29.1, V76.44/Z12.5 - 02/15/2021  o LH (69271) - 600.00, 607.84/N52.9, 257.2/E29.1, V76.44/Z12.5 - 02/15/2021  o FSH (85101) - 600.00, 607.84/N52.9, 257.2/E29.1, V76.44/Z12.5 - 02/15/2021  o Testosterone Complete Panel(Total,Free,Bioavailable,Sex Hormone) (TESCO) - 600.00, 607.84/N52.9, 257.2/E29.1, V76.44/Z12.5 - 02/15/2021  o PSA Ultrasensitive, ANNUAL SCREENING [facility] (10557, ) - 600.00, 607.84/N52.9, 257.2/E29.1, V76.44/Z12.5 - 02/15/2021  · Medications  o sildenafil 100 mg oral tablet   SIG: take 1 tablet (100 mg) by oral route once daily as needed approximately 1 hour before sexual activity for 30 days   DISP: (30) Tablet with 0 refills  Prescribed on 02/15/2021     o Medications have been Reconciled     continue testosterone injections 200mg im q 2 weeks self admin as long as compliance 2 vials no refills per escribe  discussed follow up must be q 3 months and kendra query needed. lab slip provided and needs drawn 1 week after next injections. follows with pain management and regular urine drug screens             Electronically Signed by: SVETLANA Cobian -Author on February 15, 2021 04:05:52 PM

## 2021-05-15 VITALS — HEART RATE: 86 BPM | HEIGHT: 69 IN | WEIGHT: 276.5 LBS | OXYGEN SATURATION: 97 % | BODY MASS INDEX: 40.95 KG/M2

## 2021-05-15 VITALS
DIASTOLIC BLOOD PRESSURE: 78 MMHG | BODY MASS INDEX: 37.77 KG/M2 | HEART RATE: 72 BPM | WEIGHT: 255 LBS | SYSTOLIC BLOOD PRESSURE: 123 MMHG | HEIGHT: 69 IN

## 2021-05-15 VITALS — HEART RATE: 88 BPM | WEIGHT: 276.5 LBS | BODY MASS INDEX: 40.95 KG/M2 | OXYGEN SATURATION: 96 % | HEIGHT: 69 IN

## 2021-05-15 VITALS — OXYGEN SATURATION: 97 % | BODY MASS INDEX: 40.34 KG/M2 | WEIGHT: 272.37 LBS | HEART RATE: 92 BPM | HEIGHT: 69 IN

## 2021-05-15 VITALS — TEMPERATURE: 97.6 F | HEIGHT: 69 IN | BODY MASS INDEX: 38.71 KG/M2 | WEIGHT: 261.37 LBS

## 2021-05-15 VITALS
WEIGHT: 276 LBS | BODY MASS INDEX: 40.88 KG/M2 | DIASTOLIC BLOOD PRESSURE: 78 MMHG | HEART RATE: 79 BPM | TEMPERATURE: 98.4 F | SYSTOLIC BLOOD PRESSURE: 118 MMHG | HEIGHT: 69 IN

## 2021-05-15 VITALS — HEIGHT: 69 IN | HEART RATE: 72 BPM | WEIGHT: 267 LBS | OXYGEN SATURATION: 98 % | BODY MASS INDEX: 39.55 KG/M2

## 2021-05-15 VITALS
BODY MASS INDEX: 37.47 KG/M2 | DIASTOLIC BLOOD PRESSURE: 85 MMHG | TEMPERATURE: 98 F | HEART RATE: 81 BPM | WEIGHT: 253 LBS | SYSTOLIC BLOOD PRESSURE: 120 MMHG | HEIGHT: 69 IN | RESPIRATION RATE: 18 BRPM

## 2021-05-15 VITALS — HEIGHT: 69 IN | HEART RATE: 81 BPM | BODY MASS INDEX: 39.55 KG/M2 | WEIGHT: 267 LBS | OXYGEN SATURATION: 97 %

## 2021-05-15 VITALS
HEIGHT: 69 IN | HEART RATE: 79 BPM | BODY MASS INDEX: 37.93 KG/M2 | SYSTOLIC BLOOD PRESSURE: 130 MMHG | TEMPERATURE: 97.8 F | DIASTOLIC BLOOD PRESSURE: 74 MMHG | WEIGHT: 256.06 LBS

## 2021-05-15 VITALS
SYSTOLIC BLOOD PRESSURE: 123 MMHG | WEIGHT: 275 LBS | TEMPERATURE: 98.1 F | BODY MASS INDEX: 39.37 KG/M2 | HEIGHT: 70 IN | DIASTOLIC BLOOD PRESSURE: 77 MMHG | HEART RATE: 81 BPM

## 2021-05-15 VITALS
SYSTOLIC BLOOD PRESSURE: 110 MMHG | BODY MASS INDEX: 16.88 KG/M2 | HEART RATE: 79 BPM | DIASTOLIC BLOOD PRESSURE: 69 MMHG | WEIGHT: 114.31 LBS

## 2021-05-15 VITALS — TEMPERATURE: 98 F | HEIGHT: 69 IN | BODY MASS INDEX: 37.77 KG/M2 | WEIGHT: 255 LBS

## 2021-05-16 VITALS
HEIGHT: 70 IN | TEMPERATURE: 98.2 F | SYSTOLIC BLOOD PRESSURE: 145 MMHG | HEART RATE: 81 BPM | DIASTOLIC BLOOD PRESSURE: 81 MMHG | WEIGHT: 281 LBS | BODY MASS INDEX: 40.23 KG/M2

## 2021-05-16 VITALS
HEART RATE: 89 BPM | DIASTOLIC BLOOD PRESSURE: 77 MMHG | SYSTOLIC BLOOD PRESSURE: 133 MMHG | BODY MASS INDEX: 39.94 KG/M2 | TEMPERATURE: 98.6 F | WEIGHT: 279 LBS | HEIGHT: 70 IN

## 2021-05-16 VITALS
HEIGHT: 70 IN | WEIGHT: 279 LBS | DIASTOLIC BLOOD PRESSURE: 77 MMHG | HEART RATE: 82 BPM | TEMPERATURE: 98.3 F | SYSTOLIC BLOOD PRESSURE: 133 MMHG | BODY MASS INDEX: 39.94 KG/M2

## 2021-05-16 VITALS
TEMPERATURE: 98.5 F | BODY MASS INDEX: 39.37 KG/M2 | HEIGHT: 70 IN | SYSTOLIC BLOOD PRESSURE: 150 MMHG | DIASTOLIC BLOOD PRESSURE: 80 MMHG | HEART RATE: 78 BPM | WEIGHT: 275 LBS

## 2021-06-03 ENCOUNTER — CONVERSION ENCOUNTER (OUTPATIENT)
Dept: UROLOGY | Facility: CLINIC | Age: 56
End: 2021-06-03

## 2021-06-03 ENCOUNTER — OFFICE VISIT CONVERTED (OUTPATIENT)
Dept: UROLOGY | Facility: CLINIC | Age: 56
End: 2021-06-03
Attending: NURSE PRACTITIONER

## 2021-06-03 LAB
BILIRUB UR QL STRIP: NEGATIVE
COLOR UR: YELLOW
CONV BACTERIA IN URINE MICRO: 0
CONV CALCIUM OXALATE CRYSTALS /HPF IN URINE SEDIMENT BY MICROSCOPY: 0
CONV CLARITY OF URINE: CLEAR
CONV PROTEIN IN URINE BY AUTOMATED TEST STRIP: NEGATIVE
CONV UROBILINOGEN IN URINE BY AUTOMATED TEST STRIP: NORMAL
GLUCOSE UR QL: NORMAL
HGB UR QL STRIP: NEGATIVE
KETONES UR QL STRIP: NEGATIVE
LEUKOCYTE ESTERASE UR QL STRIP: NEGATIVE
NITRITE UR QL STRIP: NEGATIVE
PH UR STRIP.AUTO: 5.5 [PH]
RBC #/AREA URNS HPF: 0 /[HPF]
RENAL EPI CELLS #/AREA URNS HPF: 0 /[HPF]
SP GR UR: 1.01
SQUAMOUS SPT QL MICRO: 0
WBC #/AREA URNS HPF: 0 /[HPF]

## 2021-06-05 NOTE — PROGRESS NOTES
Progress Note      Patient Name: Jeronimo Phillips   Patient ID: [id number]   Sex: Male   YOB: 1965    Primary Care Provider: Danny Mccartney   Referring Provider: Ran Walsh MD    Visit Date: Michelle 3, 2021    Provider: SVETLANA Cobian   Location: [facility]   Location Address: [address] [place] [address], [address] [place] [address]  [id number]   Location Phone: [phone]          Chief Complaint  · lack of energy      History Of Present Illness  The patient is a 56 year old /White male presents today in office with complaints of low testosterone and erectile dysfunction. Patient is on self-injections testosterone 200mg im q 2 weeks and feels effective for energy. Taking sildenafil prn for erectile problems. No voiding complaints. Reports severe constipation and Linzess per pain management too expensive. Denies dysuria or gross hematuria.      PSA 1.10 on 3/6/21  Testosterone 549 on 3/6/21 hct wnl 44.5       Past Medical History  Arthritis; Back Pain; BPH (benign prostatic hyperplasia); Cervical disc degeneration; Cervical radiculitis; Cervical spinal stenosis; Cervical strain; Chest pain; Degenerative cervical disc; Diabetes; ED (erectile dysfunction) of organic origin; Elevated cholesterol; h/o Arthritis; Headache; Heart Attack; Heart disease; HTN (hypertension); Hyperlipemia; Hyperlipidemia; Hypertension; Hypogonadism male; Hypothyroidism; Limb Swelling; Lumb / LS disc degeneration; Meatal Stenosis; MI (myocardial infarction); Neck pain; Neck pain; Reflux; Seasonal allergies; Shortness of Breath; Sleep apnea; Thyroid Disease; Thyroid disorder         Past Surgical History  Bowel resection; cardiac stents; Cervical Laminectomy; Cholecystectomy; Colonoscopy; Cystoscopy; Gallbladder; heart surgery; Inguinal Hernia Repair; Joint Surgery; Knee repair; Minimally invasive Discectomy; Neck; Stomach surgery; Vasectomy         Medication List  amlodipine 10 mg oral tablet; aspirin 81 mg oral  "tablet,delayed release (DR/EC); BD Luer-Kimberly Syringe 3 mL 25 gauge x 1\" miscellaneous syringe; Bystolic 5 mg oral tablet; Celebrex 50 mg oral capsule; Crestor 40 mg oral tablet; cyclobenzaprine 10 mg oral tablet; gabapentin 800 mg oral tablet; glipizide 5 mg oral tablet; levothyroxine 200 mcg Oral Tablet; lisinopril 5 mg oral tablet; metformin 500 mg oral tablet; Nexium 40 mg oral capsule,delayed release(DR/EC); oxycodone-acetaminophen 7.5-325 mg oral tablet; sildenafil 100 mg oral tablet; testosterone cypionate 200 mg/mL intramuscular oil; Vitamin D2 50,000 unit oral capsule; Vitamin D3 1,000 unit oral tablet         Allergy List  Bactrim; Bactrim DS; Plavix         Family Medical History  Heart Disease; Diabetes, unspecified type; Hypertension; Heart Attack (MI)         Social History  Alcohol (Current some day); ; lives with spouse; .; Recreational Drug Use (Never); Tobacco (Current every day); Working         Review of Systems  · Constitutional  o Denies  o : fever, headache, chills  · Eyes  o Denies  o : eye pain, double vision, blurred vision  · HENT  o Denies  o : sinus problems, sore throat, ear infection  · Cardiovascular  o Denies  o : chest pain, high blood pressure,   · Respiratory  o Denies  o : shortness of breath, wheezing, frequent cough  · Gastrointestinal  o Admits  o : bloating and constipation  o Denies  o : nausea, vomiting, heartburn, indigestion,   · Genitourinary  o Denies  o : urgency, frequency, urinary retention, painful urination  · Integument  o Denies  o : rash, itching, boils  · Neurologic  o Denies  o : tremors, dizzy spells  · Musculoskeletal  o Denies  o : joint pain, neck pain, back pain  · Endocrine  o Denies  o : cold intolerance, heat intolerance, tired, excessive thirst, sluggish  · Psychiatric  o Denies  o : severe depression, concerns with hurting themselves  · Heme-Lymph  o Denies  o : swollen glands, blood clotting " "problems  · Allergic-Immunologic  o Denies  o : sinus allergy symptoms, hay fever      Vitals  Date Time BP Position Site L\R Cuff Size HR RR TEMP (F) WT  HT  BMI kg/m2 BSA m2 O2 Sat FR L/min FiO2        06/03/2021 03:42 /70 Sitting    84 - R  98 275lbs 6oz 5'  9\" 40.67 2.47             Physical Examination  · Constitutional  o Appearance  o : well developed obese, , no obvious deformities present  · Respiratory  o Respiratory Effort  o : breathing unlabored  o Inspection of Chest  o : normal appearance, no retractions  o Auscultation of Lungs  o : diminished throughout  · Cardiovascular  o Heart  o :   § Auscultation of Heart  § : regular rate and rhythm, no murmurs, gallops or rubs  · Gastrointestinal  o Abdominal Examination  o : abdomen obese nontender to palpation, tone normal without rigidity or guarding, no masses present, abdominal contour scaphoid hypoactive bowels  o Hernias  o : absent   · Lymphatic  o Groin  o : no lymphadenopathy present, normal size  · Skin and Subcutaneous Tissue  o General Inspection  o : no lesions present, no areas of discoloration, skin turgor normal, texture normal  · Neurologic  o Mental Status Examination  o : oriented to person, place and time  o Gait and Station  o : normal gait, able to stand without difficulty  · Psychiatric  o Mood and Affect  o : mood normal, affect appropriate          Results  · In-Office Procedures  o Lab procedure  § Automated dipstick urinalysis with microscopy ([id number])   § Color Ur: Yellow   § Clarity Ur: Clear   § Glucose Ur Ql Strip: 500 mg/dL   § Bilirub Ur Ql Strip: Negative   § Ketones Ur Ql Strip: Negative   § Sp Gr Ur Qn: 1.010   § Hgb Ur Ql Strip: Negative   § pH Ur-LsCnc: 5.5   § Prot Ur Ql Strip: Negative   § Urobilinogen Ur Strip-mCnc: 0.2 E.U./dL   § Nitrite Ur Ql Strip: Negative   § WBC Est Ur Ql Strip: Negative   § RBC UrnS Qn HPF: 0   § WBC UrnS Qn HPF: 0   § Bacteria UrnS Qn HPF: 0   § Crystals UrnS Qn HPF: 0   § Epithelial " Cells (non renal): 0 /HPF  § Epithelial Cells (renal): 0       Assessment  · Hypogonadism, male     257.2/E29.1  · BPH (benign prostatic hyperplasia)     600.00/N40.0  · Encounter for medication monitoring     V58.83/Z51.81  · Diabetes     250.00/E11.9      Plan  · Medications  o testosterone cypionate 200 mg/mL intramuscular oil   SIG: inject 200mg 200mg/ml q 2 weeks   DISP: (2) Vial with 0 refills  Adjusted on 06/03/2021     o Medications have been Reconciled  o Transition of Care or Provider Policy     continue testosterone cypionate 200mg im q 2 qweeks. 2 vials 28 day supply. self injections and needles/syringes refilled. Vikas reviewed. again understanding of all risks benefits and alternative testosterone therapy. Ehsan due next Feb. hct wnl advised miralax for constipation related to opiod therapy. follow up 3 months             Electronically Signed by: SVETLANA Cobian -Author on Michelle 3, 2021 05:04:52 PM

## 2021-06-10 RX ORDER — NEEDLES, DISPOSABLE 25GX5/8"
NEEDLE, DISPOSABLE MISCELLANEOUS
Qty: 12 EACH | Refills: 0 | OUTPATIENT
Start: 2021-06-10

## 2021-06-10 RX ORDER — TESTOSTERONE CYPIONATE 200 MG/ML
INJECTION, SOLUTION INTRAMUSCULAR
Refills: 0 | OUTPATIENT
Start: 2021-06-10

## 2021-06-10 NOTE — TELEPHONE ENCOUNTER
Per Provider, refill was submitted 06- in [employer] EMR. Message sent back to pharmacy through [employer] for this prescription.

## 2021-07-02 ENCOUNTER — APPOINTMENT (OUTPATIENT)
Dept: WOMENS IMAGING | Facility: HOSPITAL | Age: 56
End: 2021-07-02

## 2021-07-06 ENCOUNTER — TRANSCRIBE ORDERS (OUTPATIENT)
Dept: FAMILY MEDICINE CLINIC | Facility: CLINIC | Age: 56
End: 2021-07-06

## 2021-07-12 DIAGNOSIS — E29.1 HYPOGONADISM IN MALE: Primary | ICD-10-CM

## 2021-07-14 RX ORDER — TESTOSTERONE CYPIONATE 200 MG/ML
200 INJECTION, SOLUTION INTRAMUSCULAR
Qty: 2 ML | Refills: 0 | Status: SHIPPED | OUTPATIENT
Start: 2021-07-14 | End: 2021-07-15

## 2021-07-15 VITALS
WEIGHT: 275.37 LBS | HEART RATE: 84 BPM | HEIGHT: 69 IN | DIASTOLIC BLOOD PRESSURE: 70 MMHG | BODY MASS INDEX: 40.79 KG/M2 | TEMPERATURE: 98 F | SYSTOLIC BLOOD PRESSURE: 130 MMHG

## 2021-07-15 DIAGNOSIS — E29.1 HYPOGONADISM IN MALE: ICD-10-CM

## 2021-07-15 RX ORDER — TESTOSTERONE CYPIONATE 200 MG/ML
200 INJECTION, SOLUTION INTRAMUSCULAR
Qty: 2 ML | Refills: 0 | Status: SHIPPED | OUTPATIENT
Start: 2021-07-15 | End: 2021-08-11

## 2021-08-11 DIAGNOSIS — E29.1 HYPOGONADISM IN MALE: ICD-10-CM

## 2021-08-11 RX ORDER — TESTOSTERONE CYPIONATE 200 MG/ML
INJECTION, SOLUTION INTRAMUSCULAR
Qty: 2 ML | Refills: 0 | Status: SHIPPED | OUTPATIENT
Start: 2021-08-11 | End: 2021-09-14 | Stop reason: SDUPTHER

## 2021-08-27 DIAGNOSIS — E29.1 HYPOGONADISM IN MALE: Primary | ICD-10-CM

## 2021-08-27 RX ORDER — NEEDLES, DISPOSABLE 25GX5/8"
NEEDLE, DISPOSABLE MISCELLANEOUS
Qty: 12 EACH | Refills: 0 | Status: SHIPPED | OUTPATIENT
Start: 2021-08-27 | End: 2023-03-14

## 2021-09-14 ENCOUNTER — OFFICE VISIT (OUTPATIENT)
Dept: UROLOGY | Facility: CLINIC | Age: 56
End: 2021-09-14

## 2021-09-14 VITALS
HEART RATE: 69 BPM | TEMPERATURE: 97.5 F | WEIGHT: 270 LBS | BODY MASS INDEX: 39.99 KG/M2 | SYSTOLIC BLOOD PRESSURE: 121 MMHG | DIASTOLIC BLOOD PRESSURE: 71 MMHG | HEIGHT: 69 IN

## 2021-09-14 DIAGNOSIS — E29.1 HYPOGONADISM IN MALE: Primary | ICD-10-CM

## 2021-09-14 DIAGNOSIS — N40.0 BENIGN PROSTATIC HYPERPLASIA WITHOUT LOWER URINARY TRACT SYMPTOMS: ICD-10-CM

## 2021-09-14 DIAGNOSIS — Z51.81 MEDICATION MONITORING ENCOUNTER: ICD-10-CM

## 2021-09-14 DIAGNOSIS — N52.9 ERECTILE DYSFUNCTION, UNSPECIFIED ERECTILE DYSFUNCTION TYPE: ICD-10-CM

## 2021-09-14 LAB
BILIRUB BLD-MCNC: NEGATIVE MG/DL
CLARITY, POC: CLEAR
COLOR UR: YELLOW
GLUCOSE UR STRIP-MCNC: ABNORMAL MG/DL
KETONES UR QL: ABNORMAL
LEUKOCYTE EST, POC: NEGATIVE
NITRITE UR-MCNC: NEGATIVE MG/ML
PH UR: 6 [PH] (ref 5–8)
PROT UR STRIP-MCNC: ABNORMAL MG/DL
RBC # UR STRIP: NEGATIVE /UL
SP GR UR: 1.02 (ref 1–1.03)
UROBILINOGEN UR QL: NORMAL

## 2021-09-14 PROCEDURE — 81003 URINALYSIS AUTO W/O SCOPE: CPT | Performed by: NURSE PRACTITIONER

## 2021-09-14 PROCEDURE — 99212 OFFICE O/P EST SF 10 MIN: CPT | Performed by: NURSE PRACTITIONER

## 2021-09-14 RX ORDER — GABAPENTIN 800 MG/1
TABLET ORAL
COMMUNITY
Start: 2021-09-07

## 2021-09-14 RX ORDER — CYCLOBENZAPRINE HCL 10 MG
TABLET ORAL
COMMUNITY
Start: 2021-09-02

## 2021-09-14 RX ORDER — ASPIRIN 81 MG/1
TABLET ORAL
COMMUNITY

## 2021-09-14 RX ORDER — MELATONIN: COMMUNITY

## 2021-09-14 RX ORDER — LEVOTHYROXINE SODIUM 0.1 MG/1
TABLET ORAL
COMMUNITY
End: 2022-10-25 | Stop reason: DRUGHIGH

## 2021-09-14 RX ORDER — ROSUVASTATIN CALCIUM 40 MG/1
TABLET, COATED ORAL
COMMUNITY
Start: 2021-07-31

## 2021-09-14 RX ORDER — OMEPRAZOLE 40 MG/1
CAPSULE, DELAYED RELEASE ORAL
COMMUNITY
Start: 2021-07-03 | End: 2022-04-13 | Stop reason: SDUPTHER

## 2021-09-14 RX ORDER — LISINOPRIL 5 MG/1
TABLET ORAL
COMMUNITY
Start: 2021-09-05

## 2021-09-14 RX ORDER — TESTOSTERONE CYPIONATE 200 MG/ML
200 INJECTION, SOLUTION INTRAMUSCULAR
Qty: 2 ML | Refills: 0 | Status: SHIPPED | OUTPATIENT
Start: 2021-09-14 | End: 2021-10-18

## 2021-09-14 RX ORDER — HYDROCODONE BITARTRATE AND ACETAMINOPHEN 10; 325 MG/1; MG/1
TABLET ORAL
COMMUNITY
Start: 2021-08-16 | End: 2022-10-25

## 2021-09-14 RX ORDER — AMLODIPINE BESYLATE 10 MG/1
TABLET ORAL
COMMUNITY
Start: 2021-09-13

## 2021-09-14 RX ORDER — SILDENAFIL 100 MG/1
TABLET, FILM COATED ORAL
COMMUNITY

## 2021-09-14 RX ORDER — BISOPROLOL FUMARATE 5 MG/1
TABLET, FILM COATED ORAL
COMMUNITY
Start: 2021-08-26

## 2021-09-14 RX ORDER — GLIPIZIDE 5 MG/1
TABLET ORAL
COMMUNITY

## 2021-09-14 NOTE — PROGRESS NOTES
"PSA level Chief Complaint  Follow-up (T, last PSA 3/6/21 1.10) GERI at that time.     Subjective          Jeronimo Phillips 56 y.o. malepresents to [facility] UROLOGY  Follow-up hypogonadism and medication. Doing well on testosterone injection and feels helps with energy. Denies any side effects. No dysuria or gross hematuria. Constipation due to opioid therapy is the only problem. Sildenafil taken only prn.     Review of Systems   Constitutional: Negative for chills and fever.   Respiratory: Negative.    Cardiovascular: Negative.    Gastrointestinal: Positive for constipation.   Genitourinary: Negative for dysuria, flank pain, hematuria and scrotal swelling.   Musculoskeletal: Positive for arthralgias, back pain and joint swelling.   Psychiatric/Behavioral: Negative for suicidal ideas.      Objective   Vital Signs:   /71   Pulse 69   Temp 97.5 °F (36.4 °C)   Ht 175.3 cm (69\")   Wt 122 kg (270 lb)   BMI 39.87 kg/m²      Past Surgical History:   Procedure Laterality Date   • CARDIAC SURGERY     • CERVICAL LAMINECTOMY  08/24/2015    C3-4 C4-5 C5-6   • CHOLECYSTECTOMY     • COLON RESECTION     • COLONOSCOPY  2012   • CORONARY ANGIOPLASTY WITH STENT PLACEMENT  2010   • CYSTOSCOPY     • GALLBLADDER SURGERY     • INGUINAL HERNIA REPAIR Left    • KNEE SURGERY Right 01/15/2020    REPAIR   • LUMBAR DISCECTOMY Right 10/14/2020    L3-4 DISECTOMY WITH LAMINECTOMY MINIMAL INVASIVE   • NECK SURGERY     • STOMACH SURGERY     • VASECTOMY        Physical Exam  Vitals and nursing note reviewed.   Constitutional:       General: He is not in acute distress.     Appearance: He is obese. He is not ill-appearing.   Cardiovascular:      Rate and Rhythm: Normal rate and regular rhythm.   Pulmonary:      Effort: Pulmonary effort is normal.      Comments: Faint wheeze left lower lobe  Skin:     General: Skin is warm and dry.   Neurological:      Mental Status: He is alert and oriented to person, place, and time. "   Psychiatric:         Thought Content: Thought content normal.         Judgment: Judgment normal.        Result Review :   The following data was reviewed by: SVETLANA Roberto on 09/14/2021:   HgB    HGB 3/6/21   Hemoglobin 14.7           HCT    HGT 3/6/21   Hematocrit 44.5           PSA    PSA 3/6/21   PSA 1.10                      Assessment and Plan    Diagnoses and all orders for this visit:    1. Hypogonadism in male (Primary)  -     POC Urinalysis Dipstick, Automated  -     Testosterone Cypionate (DEPOTESTOTERONE CYPIONATE) 200 MG/ML injection; Inject 1 mL into the appropriate muscle as directed by prescriber Every 14 (Fourteen) Days.  Dispense: 2 mL; Refill: 0    2. Benign prostatic hyperplasia without lower urinary tract symptoms    3. Erectile dysfunction, unspecified erectile dysfunction type    4. Medication monitoring encounter    GERI and psa due in March 2022. Recent labs completed per employer and will obtain results for next visit. Continue testosterone injection as doing every 14 days. Follows with pain management and drug screen urine reviewed.     Follow Up   Return in about 3 months (around 12/14/2021) for hypogonadism and med monitoring.  Patient was given instructions and counseling regarding his condition or for health maintenance advice. Please see specific information pulled into the AVS if appropriate.     SVETLANA Roberto

## 2021-09-28 DIAGNOSIS — E29.1 HYPOGONADISM IN MALE: Primary | ICD-10-CM

## 2021-09-28 RX ORDER — SYRINGE WITH NEEDLE, 1 ML 25GX5/8"
SYRINGE, EMPTY DISPOSABLE MISCELLANEOUS
Qty: 2 EACH | Refills: 6 | Status: SHIPPED | OUTPATIENT
Start: 2021-09-28 | End: 2022-05-27

## 2021-10-15 DIAGNOSIS — E29.1 HYPOGONADISM IN MALE: ICD-10-CM

## 2021-10-18 RX ORDER — TESTOSTERONE CYPIONATE 200 MG/ML
INJECTION, SOLUTION INTRAMUSCULAR
Qty: 2 ML | Refills: 0 | Status: SHIPPED | OUTPATIENT
Start: 2021-10-18 | End: 2021-11-17

## 2021-11-16 DIAGNOSIS — E29.1 HYPOGONADISM IN MALE: ICD-10-CM

## 2021-11-17 RX ORDER — TESTOSTERONE CYPIONATE 200 MG/ML
INJECTION, SOLUTION INTRAMUSCULAR
Qty: 2 ML | Refills: 0 | Status: SHIPPED | OUTPATIENT
Start: 2021-11-17 | End: 2021-12-15 | Stop reason: SDUPTHER

## 2021-12-05 DIAGNOSIS — E29.1 HYPOGONADISM IN MALE: ICD-10-CM

## 2021-12-08 RX ORDER — TESTOSTERONE CYPIONATE 200 MG/ML
INJECTION, SOLUTION INTRAMUSCULAR
Qty: 2 ML | OUTPATIENT
Start: 2021-12-08

## 2021-12-15 ENCOUNTER — OFFICE VISIT (OUTPATIENT)
Dept: UROLOGY | Facility: CLINIC | Age: 56
End: 2021-12-15

## 2021-12-15 VITALS
HEIGHT: 69 IN | TEMPERATURE: 97.5 F | BODY MASS INDEX: 37.77 KG/M2 | HEART RATE: 70 BPM | DIASTOLIC BLOOD PRESSURE: 72 MMHG | WEIGHT: 255 LBS | SYSTOLIC BLOOD PRESSURE: 117 MMHG

## 2021-12-15 DIAGNOSIS — E29.1 HYPOGONADISM IN MALE: ICD-10-CM

## 2021-12-15 DIAGNOSIS — N40.0 BENIGN PROSTATIC HYPERPLASIA, UNSPECIFIED WHETHER LOWER URINARY TRACT SYMPTOMS PRESENT: ICD-10-CM

## 2021-12-15 DIAGNOSIS — Z51.81 MEDICATION MONITORING ENCOUNTER: ICD-10-CM

## 2021-12-15 DIAGNOSIS — E29.1 HYPOGONADISM IN MALE: Primary | ICD-10-CM

## 2021-12-15 PROBLEM — I10 ESSENTIAL HYPERTENSION: Status: ACTIVE | Noted: 2021-12-15

## 2021-12-15 PROBLEM — E11.9 DIABETES: Status: ACTIVE | Noted: 2021-12-15

## 2021-12-15 PROBLEM — N52.9 ED (ERECTILE DYSFUNCTION) OF ORGANIC ORIGIN: Status: ACTIVE | Noted: 2021-12-15

## 2021-12-15 PROBLEM — G47.33 OBSTRUCTIVE SLEEP APNEA: Status: ACTIVE | Noted: 2021-12-15

## 2021-12-15 PROBLEM — I21.9 MI (MYOCARDIAL INFARCTION): Status: ACTIVE | Noted: 2021-12-15

## 2021-12-15 PROBLEM — Z95.5 PRESENCE OF CORONARY ANGIOPLASTY IMPLANT AND GRAFT: Status: ACTIVE | Noted: 2021-12-15

## 2021-12-15 LAB
BILIRUB BLD-MCNC: NEGATIVE MG/DL
CLARITY, POC: CLEAR
COLOR UR: ABNORMAL
GLUCOSE UR STRIP-MCNC: ABNORMAL MG/DL
KETONES UR QL: NEGATIVE
LEUKOCYTE EST, POC: NEGATIVE
NITRITE UR-MCNC: NEGATIVE MG/ML
PH UR: 6 [PH] (ref 5–8)
PROT UR STRIP-MCNC: ABNORMAL MG/DL
RBC # UR STRIP: NEGATIVE /UL
SP GR UR: 1.03 (ref 1–1.03)
UROBILINOGEN UR QL: NORMAL

## 2021-12-15 PROCEDURE — 99214 OFFICE O/P EST MOD 30 MIN: CPT | Performed by: NURSE PRACTITIONER

## 2021-12-15 PROCEDURE — 81003 URINALYSIS AUTO W/O SCOPE: CPT | Performed by: NURSE PRACTITIONER

## 2021-12-15 RX ORDER — TESTOSTERONE CYPIONATE 200 MG/ML
INJECTION, SOLUTION INTRAMUSCULAR
Qty: 2 ML | Refills: 0 | Status: SHIPPED | OUTPATIENT
Start: 2021-12-15 | End: 2022-01-14

## 2021-12-15 NOTE — PROGRESS NOTES
"Chief Complaint  Follow-up (low testosterone followup)    Subjective          Jeronimo Phillips 56 y.o. male presents to [facility] UROLOGY  Routine follow up for hypogonadism. Testosterone injections 2000mg im q 2 weeks self injection. Doing well urologically. Denies dysuria or gross hematuria. Sometimes urine flow is adequate other times good. Denies straining to void. Problems with constipation from pain medications. Denies any adverse side effects .     Review of Systems   Constitutional: Negative for chills and fever.   Respiratory: Negative for cough, chest tightness and shortness of breath.    Cardiovascular: Negative for chest pain.   Gastrointestinal: Negative for abdominal pain.   Genitourinary: Negative for dysuria, flank pain, frequency and hematuria.      Objective   Vital Signs:   /72   Pulse 70   Temp 97.5 °F (36.4 °C) (Infrared)   Ht 175.3 cm (69\")   Wt 116 kg (255 lb)   BMI 37.66 kg/m²      Past Surgical History:   Procedure Laterality Date   • CARDIAC SURGERY     • CERVICAL LAMINECTOMY  08/24/2015    C3-4 C4-5 C5-6   • CHOLECYSTECTOMY     • COLON RESECTION     • COLONOSCOPY  2012   • CORONARY ANGIOPLASTY WITH STENT PLACEMENT  2010   • CYSTOSCOPY     • GALLBLADDER SURGERY     • INGUINAL HERNIA REPAIR Left    • KNEE SURGERY Right 01/15/2020    REPAIR   • LUMBAR DISCECTOMY Right 10/14/2020    L3-4 DISECTOMY WITH LAMINECTOMY MINIMAL INVASIVE   • NECK SURGERY     • STOMACH SURGERY     • VASECTOMY          Physical Exam  Vitals and nursing note reviewed.   Constitutional:       General: He is awake. He is not in acute distress.     Appearance: Normal appearance. He is well-developed. He is obese. He is not ill-appearing or toxic-appearing.      Comments: Ambulates without difficulty   Cardiovascular:      Rate and Rhythm: Normal rate and regular rhythm.   Pulmonary:      Effort: Pulmonary effort is normal.      Breath sounds: Normal breath sounds and air entry. "   Musculoskeletal:         General: Normal range of motion.   Skin:     General: Skin is warm and dry.   Neurological:      Mental Status: He is oriented to person, place, and time. He is lethargic.      Motor: Motor function is intact.   Psychiatric:         Behavior: Behavior is cooperative.         Thought Content: Thought content normal.         Judgment: Judgment normal.      Comments: Depressed         Result Review :     POC Urinalysis Dipstick (12/15/2021 15:47)                Assessment and Plan    Diagnoses and all orders for this visit:    1. Hypogonadism in male (Primary)  -     POC Urinalysis Dipstick    2. Medication monitoring encounter    3. Benign prostatic hyperplasia, unspecified whether lower urinary tract symptoms present    to continue testosterone injections 200mg im q 14 days #2 no refill. Has refills on syringes with needles. Patient is to bring in fax copy of cbc collect at work. Last visit was to provide, states forgot.  Testosterone, psa and associated labs due March 2022.  kendra query and patient follows with pain management.     Follow Up   Return in about 3 months (around 3/15/2022) for hypogonadism patient to send cbc prior.  Patient was given instructions and counseling regarding his condition or for health maintenance advice. Please see specific information pulled into the AVS if appropriate.     SVETLANA Roberto

## 2022-01-14 DIAGNOSIS — E29.1 HYPOGONADISM IN MALE: ICD-10-CM

## 2022-01-14 RX ORDER — TESTOSTERONE CYPIONATE 200 MG/ML
INJECTION, SOLUTION INTRAMUSCULAR
Qty: 2 ML | Refills: 0 | Status: SHIPPED | OUTPATIENT
Start: 2022-01-14 | End: 2022-02-11

## 2022-02-11 DIAGNOSIS — E29.1 HYPOGONADISM IN MALE: ICD-10-CM

## 2022-02-11 RX ORDER — TESTOSTERONE CYPIONATE 200 MG/ML
INJECTION, SOLUTION INTRAMUSCULAR
Qty: 2 ML | Refills: 0 | Status: SHIPPED | OUTPATIENT
Start: 2022-02-11 | End: 2022-03-17

## 2022-03-14 DIAGNOSIS — E29.1 HYPOGONADISM IN MALE: ICD-10-CM

## 2022-03-17 RX ORDER — TESTOSTERONE CYPIONATE 200 MG/ML
INJECTION, SOLUTION INTRAMUSCULAR
Qty: 2 ML | Refills: 0 | Status: SHIPPED | OUTPATIENT
Start: 2022-03-17 | End: 2022-04-22 | Stop reason: SDUPTHER

## 2022-04-13 ENCOUNTER — OFFICE VISIT (OUTPATIENT)
Dept: UROLOGY | Facility: CLINIC | Age: 57
End: 2022-04-13

## 2022-04-13 VITALS
DIASTOLIC BLOOD PRESSURE: 74 MMHG | WEIGHT: 255 LBS | HEART RATE: 83 BPM | TEMPERATURE: 98.6 F | HEIGHT: 69 IN | BODY MASS INDEX: 37.77 KG/M2 | SYSTOLIC BLOOD PRESSURE: 129 MMHG

## 2022-04-13 DIAGNOSIS — D64.9 LOW HEMOGLOBIN AND LOW HEMATOCRIT: ICD-10-CM

## 2022-04-13 DIAGNOSIS — E29.1 HYPOGONADISM IN MALE: Primary | ICD-10-CM

## 2022-04-13 DIAGNOSIS — N52.9 ERECTILE DYSFUNCTION, UNSPECIFIED ERECTILE DYSFUNCTION TYPE: ICD-10-CM

## 2022-04-13 DIAGNOSIS — N40.0 BENIGN PROSTATIC HYPERPLASIA, UNSPECIFIED WHETHER LOWER URINARY TRACT SYMPTOMS PRESENT: ICD-10-CM

## 2022-04-13 DIAGNOSIS — Z51.81 MEDICATION MONITORING ENCOUNTER: ICD-10-CM

## 2022-04-13 LAB
BILIRUB BLD-MCNC: NEGATIVE MG/DL
CLARITY, POC: CLEAR
COLOR UR: YELLOW
EXPIRATION DATE: ABNORMAL
GLUCOSE UR STRIP-MCNC: ABNORMAL MG/DL
KETONES UR QL: NEGATIVE
LEUKOCYTE EST, POC: NEGATIVE
Lab: ABNORMAL
NITRITE UR-MCNC: NEGATIVE MG/ML
PH UR: 6 [PH] (ref 5–8)
PROT UR STRIP-MCNC: ABNORMAL MG/DL
RBC # UR STRIP: NEGATIVE /UL
SP GR UR: 1.03 (ref 1–1.03)
UROBILINOGEN UR QL: NORMAL

## 2022-04-13 PROCEDURE — 99214 OFFICE O/P EST MOD 30 MIN: CPT | Performed by: NURSE PRACTITIONER

## 2022-04-13 PROCEDURE — 81003 URINALYSIS AUTO W/O SCOPE: CPT | Performed by: NURSE PRACTITIONER

## 2022-04-13 RX ORDER — NABUMETONE 500 MG/1
TABLET, FILM COATED ORAL EVERY 6 HOURS SCHEDULED
COMMUNITY
End: 2022-07-13

## 2022-04-13 RX ORDER — SILDENAFIL CITRATE 20 MG/1
20 TABLET ORAL 3 TIMES DAILY
COMMUNITY
Start: 2022-01-05 | End: 2022-07-13 | Stop reason: SDUPTHER

## 2022-04-13 RX ORDER — OMEPRAZOLE 40 MG/1
1 CAPSULE, DELAYED RELEASE ORAL DAILY
COMMUNITY
Start: 2022-03-30 | End: 2023-01-25

## 2022-04-13 RX ORDER — OXYCODONE AND ACETAMINOPHEN 7.5; 325 MG/1; MG/1
TABLET ORAL
COMMUNITY
End: 2022-07-13

## 2022-04-13 NOTE — PROGRESS NOTES
"Chief Complaint  Hypogonadism  Medication refill  Subjective          Jeronimo Phillips 56 y.o. male presents to [facility] UROLOGY  Patient is here for routine follow up of hypogonadism.He was unable to make scheduled appointment 1 month ago and overdue to his testosterone level. It has been over 1 year completed.  Labs from January did include cbc and cmp. Patient question his low H&H. Relayed indicated anemia. Different types and causes of anemia. Patient does have history of colon polyps and unsure date of last colonoscopy.Still having problems with bloating and constipation.   Doing well with testosterone injections 200mg im q 14 days. Using cpap regularly. Denies any significant voiding problems. May have occasional hesitancy.    Review of Systems   Constitutional: Negative for chills and fever.   Respiratory: Negative for shortness of breath.    Cardiovascular: Negative for chest pain.   Gastrointestinal:        Bloating   Genitourinary: Negative for dysuria, frequency, hematuria and testicular pain.   Psychiatric/Behavioral: Negative for suicidal ideas.      Objective   Vital Signs:   /74   Pulse 83   Temp 98.6 °F (37 °C)   Ht 175.3 cm (69\")   Wt 116 kg (255 lb)   BMI 37.66 kg/m²      Past Surgical History:   Procedure Laterality Date   • CARDIAC SURGERY     • CERVICAL LAMINECTOMY  08/24/2015    C3-4 C4-5 C5-6   • CHOLECYSTECTOMY     • COLON RESECTION     • COLONOSCOPY  2012   • CORONARY ANGIOPLASTY WITH STENT PLACEMENT  2010   • CYSTOSCOPY     • GALLBLADDER SURGERY     • INGUINAL HERNIA REPAIR Left    • KNEE SURGERY Right 01/15/2020    REPAIR   • LUMBAR DISCECTOMY Right 10/14/2020    L3-4 DISECTOMY WITH LAMINECTOMY MINIMAL INVASIVE   • NECK SURGERY     • STOMACH SURGERY     • VASECTOMY      Past Medical History  Arthritis; Back Pain; BPH (benign prostatic hyperplasia); Cervical disc degeneration; Cervical radiculitis; Cervical spinal stenosis; Cervical strain; Chest pain; " Degenerative cervical disc; Diabetes; ED (erectile dysfunction) of organic origin; Elevated cholesterol; h/o Arthritis; Headache; Heart Attack; Heart disease; HTN (hypertension); Hyperlipemia; Hyperlipidemia; Hypertension; Hypogonadism male; Hypothyroidism; Limb Swelling; Lumb / LS disc degeneration; Meatal Stenosis; MI (myocardial infarction); Neck pain; Neck pain; Reflux; Seasonal allergies; Shortness of Breath; Sleep apnea; Thyroid Disease; Thyroid disorder, diabetes, colon polyps          Physical Exam  Vitals and nursing note reviewed.   Constitutional:       General: He is awake. He is not in acute distress.     Appearance: Normal appearance. He is well-developed. He is not ill-appearing.      Comments: bmi 37.66 ambulates without difficulty   Cardiovascular:      Rate and Rhythm: Normal rate and regular rhythm.      Heart sounds: Murmur: faint.   Pulmonary:      Effort: Pulmonary effort is normal.      Comments: Faint rales right lower lobe  Abdominal:      Palpations: There is no mass.      Tenderness: There is no abdominal tenderness. There is no guarding or rebound.   Genitourinary:     Penis: Normal.       Testes: Normal.      Comments: Left epididymal cyst nontender. Prostate approximately 25g no nodules nontender normal consistancy  Musculoskeletal:         General: Normal range of motion.      Right lower leg: No edema.      Left lower leg: No edema.   Neurological:      General: No focal deficit present.      Mental Status: He is oriented to person, place, and time.   Psychiatric:         Behavior: Behavior normal. Behavior is cooperative.         Thought Content: Thought content normal.         Judgment: Judgment normal.        Result Review :     CMP    CMP 1/5/22   Glucose 114 (A)   BUN 17   Creatinine 0.99   Sodium 138   Potassium 4.2   Chloride 103   Calcium 9.5   Albumin 4.4   Total Bilirubin 0.3   Alkaline Phosphatase 50   AST (SGOT) 21   ALT (SGPT) 19   (A) Abnormal value            CBC    CBC  1/5/22   WBC 7.46   RBC 4.58   Hemoglobin 12.6 (A)   Hematocrit 38.3 (A)   MCV 83.6   MCH 27.5   MCHC 32.9   RDW 16.5   Platelets 208   (A) Abnormal value            PSA    PSA 1/5/22   PSA 1.22      Comments are available for some flowsheets but are not being displayed.         LIPID PANEL (01/05/2022 15:26)  Testosterone Complete Panel (03/06/2021 10:00)              Assessment and Plan    Diagnoses and all orders for this visit:    1. Hypogonadism in male (Primary)  -     POC Urinalysis Dipstick, Automated  -     Luteinizing Hormone; Future  -     Follicle Stimulating Hormone; Future  -     TestT+TestF+SHBG; Future  -     Prolactin; Future  -     Testosterone, Bioavailable (M); Future  -     Iron Profile; Future    2. Benign prostatic hyperplasia, unspecified whether lower urinary tract symptoms present  -     Cancel: POC Urinalysis Dipstick, Automated    3. Erectile dysfunction, unspecified erectile dysfunction type    4. Low hemoglobin and low hematocrit  -     Iron Profile; Future    5. Medication monitoring encounter    kendra query and labs reviewed H&H not elevated and psa wnl. Will need testosterone panel 1 week after injection. Will add iron panel due to evaluate anemia as discussed. Advised to follow up with His GI since he may be due again for his colonoscopy. GERI performed. Continue testosterone injections as doing 200mg im q 14 days. Follow up 3 months. Also advised to monitor his weight if develops increase in pedal edema or notices soa. Informed what type of symptoms could indicate development of chf.     Follow Up   Return in about 3 months (around 7/13/2022) for hypogonadism .  Patient was given instructions and counseling regarding his condition or for health maintenance advice. Please see specific information pulled into the AVS if appropriate.     SVETLANA Roberto

## 2022-04-19 ENCOUNTER — TELEPHONE (OUTPATIENT)
Dept: UROLOGY | Facility: CLINIC | Age: 57
End: 2022-04-19

## 2022-04-19 NOTE — TELEPHONE ENCOUNTER
Caller: JOSE ANGEL IRWIN    Relationship: SELF    Best call back number: [phone]. OK TO [facility]      Pharmacy where request should be sent:    [facility] [address] [place], [place]    Additional details provided by patient:     TESTOSTERONE CYPIONATE INJECTION 200 MG     PT IS OUT OF HIS INJECTIONS AND MISSED A DOSE ON Sunday. THE PHARMACY SENT A REFILL REQUEST VIA FAX ON 4/14/22 BUT HAS RECEIVED RX YET. PLEASE SEND ASAP.     PLEASE CALL PT TO INFORM HIM ONCE RX HAS BEEN SENT.     Does the patient have less than a 3 day supply:  [x] Yes  [] No    Julieta Lau Rep   04/19/22 10:45 EDT

## 2022-04-22 DIAGNOSIS — E29.1 HYPOGONADISM IN MALE: ICD-10-CM

## 2022-04-22 RX ORDER — TESTOSTERONE CYPIONATE 200 MG/ML
INJECTION, SOLUTION INTRAMUSCULAR
Qty: 2 ML | Refills: 0 | Status: SHIPPED | OUTPATIENT
Start: 2022-04-22 | End: 2022-05-27

## 2022-05-23 ENCOUNTER — LAB (OUTPATIENT)
Dept: LAB | Facility: HOSPITAL | Age: 57
End: 2022-05-23

## 2022-05-23 DIAGNOSIS — E29.1 HYPOGONADISM IN MALE: ICD-10-CM

## 2022-05-23 DIAGNOSIS — D64.9 LOW HEMOGLOBIN AND LOW HEMATOCRIT: ICD-10-CM

## 2022-05-23 LAB
FSH SERPL-ACNC: <0.3 MIU/ML
IRON 24H UR-MRATE: 23 MCG/DL (ref 59–158)
IRON SATN MFR SERPL: 5 % (ref 20–50)
LH SERPL-ACNC: <0.3 MIU/ML
PROLACTIN SERPL-MCNC: 5.88 NG/ML (ref 4.04–15.2)
TIBC SERPL-MCNC: 477 MCG/DL (ref 298–536)
TRANSFERRIN SERPL-MCNC: 320 MG/DL (ref 200–360)

## 2022-05-23 PROCEDURE — 83002 ASSAY OF GONADOTROPIN (LH): CPT

## 2022-05-23 PROCEDURE — 84402 ASSAY OF FREE TESTOSTERONE: CPT

## 2022-05-23 PROCEDURE — 83001 ASSAY OF GONADOTROPIN (FSH): CPT

## 2022-05-23 PROCEDURE — 84270 ASSAY OF SEX HORMONE GLOBUL: CPT

## 2022-05-23 PROCEDURE — 84466 ASSAY OF TRANSFERRIN: CPT

## 2022-05-23 PROCEDURE — 84403 ASSAY OF TOTAL TESTOSTERONE: CPT

## 2022-05-23 PROCEDURE — 83540 ASSAY OF IRON: CPT

## 2022-05-23 PROCEDURE — 84146 ASSAY OF PROLACTIN: CPT

## 2022-05-23 PROCEDURE — 84410 TESTOSTERONE BIOAVAILABLE: CPT

## 2022-05-24 ENCOUNTER — HOSPITAL ENCOUNTER (OUTPATIENT)
Dept: HOSPITAL 49 - FAS | Age: 57
Discharge: HOME | End: 2022-05-24
Attending: SURGERY
Payer: COMMERCIAL

## 2022-05-24 VITALS — HEIGHT: 69 IN | WEIGHT: 245 LBS | BODY MASS INDEX: 36.29 KG/M2

## 2022-05-24 DIAGNOSIS — K64.1: Primary | ICD-10-CM

## 2022-05-24 DIAGNOSIS — F17.210: ICD-10-CM

## 2022-05-24 DIAGNOSIS — K64.4: ICD-10-CM

## 2022-05-24 DIAGNOSIS — Z79.82: ICD-10-CM

## 2022-05-24 DIAGNOSIS — Z79.84: ICD-10-CM

## 2022-05-24 LAB
ALBUMIN SERPL-MCNC: 4 G/DL (ref 3.4–5)
ALKALINE PHOSHATASE: 50 U/L (ref 46–116)
ALT SERPL-CCNC: 36 U/L (ref 16–63)
AST: 25 U/L (ref 15–37)
BILIRUBIN - TOTAL: 0.6 MG/DL (ref 0.2–1)
BUN SERPL-MCNC: 12 MG/DL (ref 7–18)
BUN/CREAT RATIO (CALC): 13.6 RATIO
CHLORIDE: 102 MMOL/L (ref 98–107)
CO2 (BICARBONATE): 28 MMOL/L (ref 21–32)
CREATININE: 0.88 MG/DL (ref 0.67–1.17)
GLOBULIN (CALCULATION): 3.5 G/DL
GLUCOSE SERPL-MCNC: 148 MG/DL (ref 74–106)
HCT: 42.7 % (ref 42–52)
HGB BLD-MCNC: 13.8 G/DL (ref 13.2–18)
MCH RBC QN AUTO: 27.3 PG (ref 25–31)
MCHC RBC AUTO-ENTMCNC: 32.3 G/DL (ref 32–36)
MCV: 84.6 FL (ref 78–100)
MPV: 10.2 FL (ref 6–9.5)
PLT: 184 K/UL (ref 150–400)
POTASSIUM: 3.8 MMOL/L (ref 3.5–5.1)
RBC: 5.05 M/UL (ref 4.7–6)
RDW: 15.7 % (ref 11.5–14)
TOTAL PROTEIN: 7.5 G/DL (ref 6.4–8.2)
WBC: 9 K/UL (ref 4–10.5)

## 2022-05-25 LAB
SHBG SERPL-SCNC: 15 NMOL/L (ref 19.3–76.4)
TESTOST FREE SERPL-MCNC: 9.7 PG/ML (ref 7.2–24)
TESTOST SERPL-MCNC: 497 NG/DL (ref 264–916)

## 2022-05-26 DIAGNOSIS — E29.1 HYPOGONADISM IN MALE: ICD-10-CM

## 2022-05-26 RX ORDER — SYRINGE WITH NEEDLE, 1 ML 25GX5/8"
SYRINGE, EMPTY DISPOSABLE MISCELLANEOUS
Qty: 2 EACH | Refills: 6 | Status: CANCELLED | OUTPATIENT
Start: 2022-05-26

## 2022-05-26 RX ORDER — TESTOSTERONE CYPIONATE 200 MG/ML
INJECTION, SOLUTION INTRAMUSCULAR
Qty: 2 ML | Refills: 0 | Status: CANCELLED | OUTPATIENT
Start: 2022-05-26

## 2022-05-26 NOTE — PROGRESS NOTES
Marry please inform patient lab results received. Testosterone level therapeutic level. Discussed his cbc results at visit and the iron panel did show low iron which can also contribute to feeling of fatigue. Discuss with his pcp. Patient to keep his 3 month follow up appointment with our office. Thank you.

## 2022-05-27 DIAGNOSIS — E29.1 HYPOGONADISM IN MALE: ICD-10-CM

## 2022-05-27 RX ORDER — TESTOSTERONE CYPIONATE 200 MG/ML
INJECTION, SOLUTION INTRAMUSCULAR
Qty: 2 ML | Refills: 0 | Status: SHIPPED | OUTPATIENT
Start: 2022-05-27 | End: 2022-06-24

## 2022-05-27 RX ORDER — SYRINGE WITH NEEDLE, 1 ML 25GX5/8"
SYRINGE, EMPTY DISPOSABLE MISCELLANEOUS
Qty: 2 EACH | Refills: 6 | Status: SHIPPED | OUTPATIENT
Start: 2022-05-27 | End: 2022-10-25 | Stop reason: SDUPTHER

## 2022-05-31 LAB
TESTOST SERPL-MCNC: 458 NG/DL
TESTOSTERONE.FREE+WB MFR SERPL: 70.1 %
TESTOSTERONE.FREE+WB SERPL-MCNC: 321 NG/DL

## 2022-06-23 DIAGNOSIS — E29.1 HYPOGONADISM IN MALE: ICD-10-CM

## 2022-06-24 RX ORDER — TESTOSTERONE CYPIONATE 200 MG/ML
INJECTION, SOLUTION INTRAMUSCULAR
Qty: 2 ML | Refills: 0 | Status: SHIPPED | OUTPATIENT
Start: 2022-06-24 | End: 2022-08-01

## 2022-07-13 ENCOUNTER — OFFICE VISIT (OUTPATIENT)
Dept: UROLOGY | Facility: CLINIC | Age: 57
End: 2022-07-13

## 2022-07-13 VITALS
WEIGHT: 255 LBS | HEIGHT: 69 IN | TEMPERATURE: 98.4 F | HEART RATE: 81 BPM | BODY MASS INDEX: 37.77 KG/M2 | SYSTOLIC BLOOD PRESSURE: 115 MMHG | DIASTOLIC BLOOD PRESSURE: 71 MMHG

## 2022-07-13 DIAGNOSIS — E29.1 HYPOGONADISM IN MALE: Primary | ICD-10-CM

## 2022-07-13 DIAGNOSIS — N40.0 BENIGN PROSTATIC HYPERPLASIA WITHOUT LOWER URINARY TRACT SYMPTOMS: ICD-10-CM

## 2022-07-13 LAB
BILIRUB BLD-MCNC: NEGATIVE MG/DL
CLARITY, POC: CLEAR
COLOR UR: YELLOW
EXPIRATION DATE: ABNORMAL
GLUCOSE UR STRIP-MCNC: ABNORMAL MG/DL
KETONES UR QL: NEGATIVE
LEUKOCYTE EST, POC: NEGATIVE
Lab: ABNORMAL
NITRITE UR-MCNC: NEGATIVE MG/ML
PH UR: 5.5 [PH] (ref 5–8)
PROT UR STRIP-MCNC: ABNORMAL MG/DL
RBC # UR STRIP: NEGATIVE /UL
SP GR UR: 1.03 (ref 1–1.03)
UROBILINOGEN UR QL: NORMAL

## 2022-07-13 PROCEDURE — 99213 OFFICE O/P EST LOW 20 MIN: CPT | Performed by: NURSE PRACTITIONER

## 2022-07-13 PROCEDURE — 81003 URINALYSIS AUTO W/O SCOPE: CPT | Performed by: NURSE PRACTITIONER

## 2022-07-13 RX ORDER — DICLOFENAC SODIUM 75 MG/1
TABLET, DELAYED RELEASE ORAL EVERY 12 HOURS SCHEDULED
COMMUNITY
End: 2023-01-25

## 2022-07-13 NOTE — PROGRESS NOTES
"Chief Complaint  Follow-up and Hypogonadism    Subjective          Jeronimo Phillips 57 y.o. male presents to [facility] UROLOGY  Routine follow up for hypogonadism. Testosterone level therapeutic on 200mg q 14 days. No changes in voiding pattern. Reports having colonoscopy and banding of 3 internal hemorrhoids.       Review of Systems   Constitutional: Negative for chills and fever.      Objective   Vital Signs:   /71   Pulse 81   Temp 98.4 °F (36.9 °C)   Ht 175.3 cm (69\")   Wt 116 kg (255 lb)   BMI 37.66 kg/m²      Past Surgical History:   Procedure Laterality Date   • CARDIAC SURGERY     • CERVICAL LAMINECTOMY  08/24/2015    C3-4 C4-5 C5-6   • CHOLECYSTECTOMY     • COLON RESECTION     • COLONOSCOPY  2012   • CORONARY ANGIOPLASTY WITH STENT PLACEMENT  2010   • CYSTOSCOPY     • GALLBLADDER SURGERY     • HEMORRHOID BANDING     • INGUINAL HERNIA REPAIR Left    • KNEE SURGERY Right 01/15/2020    REPAIR   • LUMBAR DISCECTOMY Right 10/14/2020    L3-4 DISECTOMY WITH LAMINECTOMY MINIMAL INVASIVE   • NECK SURGERY     • STOMACH SURGERY     • VASECTOMY          Physical Exam  Vitals and nursing note reviewed.   Constitutional:       General: He is awake. He is not in acute distress.     Appearance: Normal appearance. He is well-developed. He is not ill-appearing.      Comments: Ambulates without difficulty   Cardiovascular:      Rate and Rhythm: Normal rate.   Pulmonary:      Effort: Pulmonary effort is normal.      Breath sounds: Normal air entry.   Musculoskeletal:         General: Normal range of motion.   Skin:     General: Skin is warm and dry.   Neurological:      Mental Status: He is oriented to person, place, and time.      Motor: Motor function is intact.   Psychiatric:         Behavior: Behavior normal. Behavior is cooperative.         Thought Content: Thought content normal.         Judgment: Judgment normal.        Result Review :         Prolactin (05/23/2022 07:45)  TestT+TestF+SHBG " (05/23/2022 07:45)  PSA DIAGNOSTIC (01/05/2022 15:26)  Iron Profile (05/23/2022 07:45)  POC Urinalysis Dipstick, Automated (07/13/2022 16:06)              Assessment and Plan    Diagnoses and all orders for this visit:    1. Hypogonadism in male (Primary)  -     POC Urinalysis Dipstick, Automated    2. Benign prostatic hyperplasia without lower urinary tract symptoms    kendra reviewed last fill 6/28/22. contionue testosterone injections 200mg im q 14 days. 2 vials 28 day supply. Discussed lab results. Iron panel indicated low iron. Discussed possible causes. Encouraged food items high in iron. Would not recommend iron supplement since he has problems with severe constipation. Recent hemorrhoid banding. Follow up 3 months.     Follow Up {Instructions Charge Capture  Follow-up Communications :23}  Return in about 3 months (around 10/13/2022) for hypogonadism.  Patient was given instructions and counseling regarding his condition or for health maintenance advice. Please see specific information pulled into the AVS if appropriate.     SVETLANA Roberto

## 2022-07-31 DIAGNOSIS — E29.1 HYPOGONADISM IN MALE: ICD-10-CM

## 2022-08-01 RX ORDER — TESTOSTERONE CYPIONATE 200 MG/ML
INJECTION, SOLUTION INTRAMUSCULAR
Qty: 2 ML | Refills: 0 | Status: SHIPPED | OUTPATIENT
Start: 2022-08-01 | End: 2022-09-01

## 2022-08-26 ENCOUNTER — CLINICAL SUPPORT (OUTPATIENT)
Dept: FAMILY MEDICINE CLINIC | Facility: CLINIC | Age: 57
End: 2022-08-26

## 2022-08-26 DIAGNOSIS — Z77.098 CHEMICAL EXPOSURE: Primary | ICD-10-CM

## 2022-08-27 ENCOUNTER — HOSPITAL ENCOUNTER (OUTPATIENT)
Dept: GENERAL RADIOLOGY | Facility: HOSPITAL | Age: 57
Discharge: HOME | End: 2022-08-27
Admitting: NURSE PRACTITIONER

## 2022-08-27 ENCOUNTER — TRANSCRIBE ORDERS (OUTPATIENT)
Dept: GENERAL RADIOLOGY | Facility: HOSPITAL | Age: 57
End: 2022-08-27

## 2022-08-27 DIAGNOSIS — M50.30 DEGENERATION OF CERVICAL INTERVERTEBRAL DISC: Primary | ICD-10-CM

## 2022-08-27 DIAGNOSIS — M50.30 DEGENERATION OF CERVICAL INTERVERTEBRAL DISC: ICD-10-CM

## 2022-08-27 PROCEDURE — 72040 X-RAY EXAM NECK SPINE 2-3 VW: CPT

## 2022-09-01 DIAGNOSIS — E29.1 HYPOGONADISM IN MALE: ICD-10-CM

## 2022-09-01 RX ORDER — TESTOSTERONE CYPIONATE 200 MG/ML
INJECTION, SOLUTION INTRAMUSCULAR
Qty: 2 ML | Refills: 0 | Status: SHIPPED | OUTPATIENT
Start: 2022-09-01 | End: 2022-09-27

## 2022-09-26 DIAGNOSIS — E29.1 HYPOGONADISM IN MALE: ICD-10-CM

## 2022-09-26 RX ORDER — TESTOSTERONE CYPIONATE 200 MG/ML
INJECTION, SOLUTION INTRAMUSCULAR
Qty: 2 ML | OUTPATIENT
Start: 2022-09-26

## 2022-09-27 DIAGNOSIS — E29.1 HYPOGONADISM IN MALE: ICD-10-CM

## 2022-09-27 RX ORDER — TESTOSTERONE CYPIONATE 200 MG/ML
INJECTION, SOLUTION INTRAMUSCULAR
Qty: 2 ML | Refills: 0 | Status: SHIPPED | OUTPATIENT
Start: 2022-09-27 | End: 2022-10-25 | Stop reason: SDUPTHER

## 2022-09-27 NOTE — TELEPHONE ENCOUNTER
Provider: SVETLANA CALLAHAN  Caller: JOSE ANGEL IRWIN   Relationship to Patient: SELF   Ph[facility]: [facility]  Phone Number: [phone], IT'S OK TO LEAVE A MESSAGE     Reason for Call: PATIENT CALLED IN. PATIENT IS COMPLETELY OUT OF MEDICATION.  LAST DOSE WAS DUE 9/22 AND HE TAKES IT 2 WEEKS APART.     PATIENT REQUESTING A CALL BACK.

## 2022-10-10 ENCOUNTER — TELEPHONE (OUTPATIENT)
Dept: UROLOGY | Facility: CLINIC | Age: 57
End: 2022-10-10

## 2022-10-14 ENCOUNTER — TELEPHONE (OUTPATIENT)
Dept: UROLOGY | Facility: CLINIC | Age: 57
End: 2022-10-14

## 2022-10-14 NOTE — TELEPHONE ENCOUNTER
HUB O READ/RESCHEDULE    Called patient left message to reschedule due to provider not in office. Hub please reschedule

## 2022-10-25 ENCOUNTER — OFFICE VISIT (OUTPATIENT)
Dept: UROLOGY | Facility: CLINIC | Age: 57
End: 2022-10-25

## 2022-10-25 VITALS
WEIGHT: 255 LBS | HEIGHT: 69 IN | DIASTOLIC BLOOD PRESSURE: 75 MMHG | SYSTOLIC BLOOD PRESSURE: 140 MMHG | TEMPERATURE: 98 F | HEART RATE: 78 BPM | BODY MASS INDEX: 37.77 KG/M2

## 2022-10-25 DIAGNOSIS — N52.9 ERECTILE DYSFUNCTION, UNSPECIFIED ERECTILE DYSFUNCTION TYPE: ICD-10-CM

## 2022-10-25 DIAGNOSIS — Z51.81 MEDICATION MONITORING ENCOUNTER: ICD-10-CM

## 2022-10-25 DIAGNOSIS — E29.1 HYPOGONADISM IN MALE: Primary | ICD-10-CM

## 2022-10-25 LAB
BILIRUB BLD-MCNC: NEGATIVE MG/DL
CLARITY, POC: CLEAR
COLOR UR: YELLOW
GLUCOSE UR STRIP-MCNC: ABNORMAL MG/DL
KETONES UR QL: ABNORMAL
LEUKOCYTE EST, POC: NEGATIVE
NITRITE UR-MCNC: NEGATIVE MG/ML
PH UR: 5.5 [PH] (ref 5–8)
PROT UR STRIP-MCNC: ABNORMAL MG/DL
RBC # UR STRIP: NEGATIVE /UL
SP GR UR: 1.03 (ref 1–1.03)
UROBILINOGEN UR QL: ABNORMAL

## 2022-10-25 PROCEDURE — 81002 URINALYSIS NONAUTO W/O SCOPE: CPT | Performed by: NURSE PRACTITIONER

## 2022-10-25 PROCEDURE — 99214 OFFICE O/P EST MOD 30 MIN: CPT | Performed by: NURSE PRACTITIONER

## 2022-10-25 RX ORDER — SYRINGE WITH NEEDLE, 1 ML 25GX5/8"
SYRINGE, EMPTY DISPOSABLE MISCELLANEOUS
Qty: 2 EACH | Refills: 6 | Status: SHIPPED | OUTPATIENT
Start: 2022-10-25

## 2022-10-25 RX ORDER — LEVOTHYROXINE SODIUM 0.2 MG/1
TABLET ORAL
COMMUNITY
Start: 2022-08-03

## 2022-10-25 RX ORDER — OXYCODONE AND ACETAMINOPHEN 7.5; 325 MG/1; MG/1
TABLET ORAL
COMMUNITY

## 2022-10-25 RX ORDER — TESTOSTERONE CYPIONATE 200 MG/ML
INJECTION, SOLUTION INTRAMUSCULAR
Qty: 2 ML | Refills: 0 | Status: SHIPPED | OUTPATIENT
Start: 2022-10-25 | End: 2022-11-30

## 2022-10-25 NOTE — PROGRESS NOTES
"Chief Complaint  Hypogonadism and bph    Subjective          Jeronimo Phillips 57 y.o. male presents to [facility] UROLOGY  History of Present Illness  Routine follow up of hypogonadism and bph. Doing well on testosterone injections 200mg im q 14 days. Takes viagra occasionally prn. Denies dysuria or gross hematuria. No problems with urinary frequency, or nocturia.     Review of Systems   Constitutional: Negative for chills and fever.      Objective   Vital Signs:   /75   Pulse 78   Temp 98 °F (36.7 °C)   Ht 175.3 cm (69\")   Wt 116 kg (255 lb)   BMI 37.66 kg/m²      Past Surgical History:   Procedure Laterality Date   • CARDIAC SURGERY     • CERVICAL LAMINECTOMY  08/24/2015    C3-4 C4-5 C5-6   • CHOLECYSTECTOMY     • COLON RESECTION     • COLONOSCOPY  2012   • CORONARY ANGIOPLASTY WITH STENT PLACEMENT  2010   • CYSTOSCOPY     • GALLBLADDER SURGERY     • HEMORRHOID BANDING     • INGUINAL HERNIA REPAIR Left    • KNEE SURGERY Right 01/15/2020    REPAIR   • LUMBAR DISCECTOMY Right 10/14/2020    L3-4 DISECTOMY WITH LAMINECTOMY MINIMAL INVASIVE   • NECK SURGERY     • STOMACH SURGERY     • VASECTOMY          Physical Exam  Vitals and nursing note reviewed.   Constitutional:       General: He is not in acute distress.     Appearance: Normal appearance. He is well-developed. He is not ill-appearing.      Comments: Ambulates without difficulty   Cardiovascular:      Rate and Rhythm: Normal rate.   Pulmonary:      Effort: Pulmonary effort is normal.      Breath sounds: Normal air entry.   Musculoskeletal:         General: Normal range of motion.   Skin:     General: Skin is warm and dry.   Neurological:      Mental Status: He is alert and oriented to person, place, and time.      Motor: Motor function is intact.   Psychiatric:         Mood and Affect: Mood normal.         Behavior: Behavior normal. Behavior is cooperative.         Thought Content: Thought content normal.         Judgment: Judgment " normal.        Result Review :   The following data was reviewed by: SVETLANA Roberto on 10/25/2022:  CMP    CMP 1/5/22   Glucose 114 (A)   BUN 17   Creatinine 0.99   Sodium 138   Potassium 4.2   Chloride 103   Calcium 9.5   Albumin 4.4   Total Bilirubin 0.3   Alkaline Phosphatase 50   AST (SGOT) 21   ALT (SGPT) 19   (A) Abnormal value            CBC    CBC 1/5/22   WBC 7.46   RBC 4.58   Hemoglobin 12.6 (A)   Hematocrit 38.3 (A)   MCV 83.6   MCH 27.5   MCHC 32.9   RDW 16.5   Platelets 208   (A) Abnormal value                PSA    PSA 1/5/22   PSA 1.22      Comments are available for some flowsheets but are not being displayed.         Order  Date: 5/23/2022 Department: [facility] LABORATORY Released By: Jane Parkinson Authorizing: Erika Diehl APRN     Reprint Order Requisition    Testosterone, Bioavailable (M) (Order #[id number]) on 5/23/22             Contains abnormal data Testosterone, Bioavailable (M)  Order: [id number]   Status: Final result        Visible to patient: Yes (not seen)        Next appt: None        Dx: Hypogonadism in male       Specimen Information: Blood    0 Result Notes      Component Ref Range & Units 5 mo ago   (5/23/22) 5 mo ago   (5/23/22) 1 yr ago   (3/6/21) 2 yr ago   (7/23/20) 2 yr ago   (12/27/19) 3 yr ago   (5/16/19)   Testosterone, Total ng/dL 458  497 R, CM  549 CM  676 CM  356 CM  426 CM    Comment: This test was developed and its performance characteristics   determined by [facility]. It has not been cleared or approved   by the Food and Drug Administration.   Reference Range:   Adult Males   >18 years    264 - 916   This [facility] LC/MS-MS method is currently certified by the   CDC Hormone Standardization Program (HoST).  Adult male   reference interval is based on a population of healthy   nonobese males (BMI <30) between 19 and 39 years old.   Gabe et.al. JCEM 2017,102;4914-8861 PMID: [id number].   Testosterone, Bioavailable ng/dL 321 High     "366 High  CM  489 High  CM  175 CM  259                         Assessment and Plan    Diagnoses and all orders for this visit:    1. Hypogonadism in male (Primary)  -     POC Urinalysis Dipstick  -     Testosterone Cypionate (DEPOTESTOTERONE CYPIONATE) 200 MG/ML injection; INJECT ONE MILLILITER INTRAMUSCULARLY EVERY 14 DAYS  Dispense: 2 mL; Refill: 0  -     Syringe/Needle, Disp, (B-D 3CC LUER-REYES SYR 25GX1\") 25G X 1\" 3 ML misc; USE WITH TESTOSTERONE  Dispense: 2 each; Refill: 6    2. Medication monitoring encounter    3. Erectile dysfunction, unspecified erectile dysfunction type    kendra reviewed. Refill testosterone injection 200mg im q 14 days 2 vials for 28 day supply. Will check dev next visit and will be due for psa in January. Testosterone labs up to date 5/2022.    Follow Up   Return in about 3 months (around 1/25/2023).  Patient was given instructions and counseling regarding his condition or for health maintenance advice. Please see specific information pulled into the AVS if appropriate.     SVETLANA Roberto      "

## 2022-11-30 DIAGNOSIS — E29.1 HYPOGONADISM IN MALE: ICD-10-CM

## 2022-11-30 RX ORDER — TESTOSTERONE CYPIONATE 200 MG/ML
INJECTION, SOLUTION INTRAMUSCULAR
Qty: 2 ML | Refills: 0 | Status: SHIPPED | OUTPATIENT
Start: 2022-11-30 | End: 2022-12-30

## 2022-12-28 DIAGNOSIS — E29.1 HYPOGONADISM IN MALE: ICD-10-CM

## 2022-12-30 RX ORDER — TESTOSTERONE CYPIONATE 200 MG/ML
INJECTION, SOLUTION INTRAMUSCULAR
Qty: 2 ML | Refills: 0 | Status: SHIPPED | OUTPATIENT
Start: 2022-12-30 | End: 2023-01-25 | Stop reason: SDUPTHER

## 2023-01-25 ENCOUNTER — OFFICE VISIT (OUTPATIENT)
Dept: UROLOGY | Facility: CLINIC | Age: 58
End: 2023-01-25
Payer: COMMERCIAL

## 2023-01-25 VITALS
BODY MASS INDEX: 37.77 KG/M2 | TEMPERATURE: 97.7 F | SYSTOLIC BLOOD PRESSURE: 130 MMHG | HEART RATE: 81 BPM | WEIGHT: 255 LBS | DIASTOLIC BLOOD PRESSURE: 76 MMHG | HEIGHT: 69 IN

## 2023-01-25 DIAGNOSIS — Z51.81 MEDICATION MONITORING ENCOUNTER: ICD-10-CM

## 2023-01-25 DIAGNOSIS — K59.09 CHRONIC CONSTIPATION: ICD-10-CM

## 2023-01-25 DIAGNOSIS — G47.33 OBSTRUCTIVE SLEEP APNEA: ICD-10-CM

## 2023-01-25 DIAGNOSIS — N40.0 BENIGN PROSTATIC HYPERPLASIA WITHOUT LOWER URINARY TRACT SYMPTOMS: ICD-10-CM

## 2023-01-25 DIAGNOSIS — N52.9 ERECTILE DYSFUNCTION, UNSPECIFIED ERECTILE DYSFUNCTION TYPE: ICD-10-CM

## 2023-01-25 DIAGNOSIS — E29.1 HYPOGONADISM IN MALE: Primary | ICD-10-CM

## 2023-01-25 LAB
BILIRUB BLD-MCNC: NEGATIVE MG/DL
CLARITY, POC: CLEAR
COLOR UR: YELLOW
EXPIRATION DATE: ABNORMAL
GLUCOSE UR STRIP-MCNC: ABNORMAL MG/DL
KETONES UR QL: NEGATIVE
LEUKOCYTE EST, POC: NEGATIVE
Lab: ABNORMAL
NITRITE UR-MCNC: NEGATIVE MG/ML
PH UR: 6 [PH] (ref 5–8)
PROT UR STRIP-MCNC: ABNORMAL MG/DL
RBC # UR STRIP: NEGATIVE /UL
SP GR UR: 1.03 (ref 1–1.03)
UROBILINOGEN UR QL: ABNORMAL

## 2023-01-25 PROCEDURE — 81003 URINALYSIS AUTO W/O SCOPE: CPT | Performed by: NURSE PRACTITIONER

## 2023-01-25 PROCEDURE — 99214 OFFICE O/P EST MOD 30 MIN: CPT | Performed by: NURSE PRACTITIONER

## 2023-01-25 RX ORDER — NABUMETONE 500 MG/1
TABLET, FILM COATED ORAL
COMMUNITY
Start: 2022-12-14

## 2023-01-25 RX ORDER — TESTOSTERONE CYPIONATE 200 MG/ML
INJECTION, SOLUTION INTRAMUSCULAR
Qty: 2 ML | Refills: 0 | Status: SHIPPED | OUTPATIENT
Start: 2023-01-25 | End: 2023-03-14

## 2023-01-25 RX ORDER — SENNA PLUS 8.6 MG/1
1 TABLET ORAL 2 TIMES DAILY
Qty: 60 TABLET | Refills: 6 | Status: SHIPPED | OUTPATIENT
Start: 2023-01-25 | End: 2023-02-24

## 2023-01-25 RX ORDER — ESOMEPRAZOLE MAGNESIUM 40 MG/1
40 CAPSULE, DELAYED RELEASE ORAL DAILY
COMMUNITY
Start: 2022-12-29 | End: 2023-12-29

## 2023-01-25 NOTE — PROGRESS NOTES
"Chief Complaint  Hypogonadism and bph    Subjective          Jeronimo Phillips57 y/o  male presents to [facility] UROLOGY  History of Present Illness  Routine follow up of bph and hypogonadism. Doing relatively well on testosterone injections 200mg im q 14 days.It helps to maintain energy and motivation. No adverse reaction.  Following with pain management and urine drug screens monitored. Copy in chart from 3-24-22.  PSA level wnl. 1.39 12/29/22. Taking viagra prn for erectile dysfunction in the past. Voices continued problem with chronic constipation r/t pain medication. States linzess did not work and too expensive. Denies dysuria or gross hematuria. Recent Labs. H&H wnl. Admits having a little trouble with his diabetes but has been working on his diet recently.       Objective   Vital Signs:   /76   Pulse 81   Temp 97.7 °F (36.5 °C)   Ht 175.3 cm (69\")   Wt 116 kg (255 lb)   BMI 37.66 kg/m²     Allergies   Allergen Reactions   • Clopidogrel Bisulfate Unknown - High Severity   • Plavix [Clopidogrel] Hives   • Sulfamethoxazole-Trimethoprim Hives   • Batroxobin Unknown - High Severity      Past medical history:  has a past medical history of Acid reflux, Arthritis, Back pain, BPH (benign prostatic hyperplasia), Cervical spinal stenosis (07/09/2015), Cervical strain, Chest pain, Condition not found, Coronary artery disease, Degeneration of lumbar intervertebral disc (03/12/2015), Degenerative cervical disc (11/17/2014), Diabetes (HCC) (06/03/2021), ED (erectile dysfunction), Elevated cholesterol, Encounter for medication monitoring (06/03/2021), Erectile dysfunction (2010), Headache, Heart attack ([facility]), Heart disease, History of arthritis, HLD (hyperlipidemia), HTN (hypertension), Hyperlipemia, Hypogonadism male, Hypothyroid, Limb swelling, MI (myocardial infarction) ([facility]), Neck pain, Other cervical disc degeneration, unspecified cervical region (07/02/2015), Seasonal allergies, " Sleep apnea, SOB (shortness of breath), Thyroid disease, Thyroid disorder, and Urinary incontinence (2022).   Past surgical history:  has a past surgical history that includes Bowel resection; Coronary angioplasty with stent (2010); Cervical laminectomy (08/24/2015); Cholecystectomy; Colonoscopy (2012); Cystoscopy; Gallbladder surgery; Cardiac surgery; Inguinal hernia repair (Left); Knee surgery (Right, 01/15/2020); Lumbar discectomy (Right, 10/14/2020); Neck surgery; Stomach surgery; Vasectomy; and Band hemorrhoidectomy.  Personal history: family history includes Diabetes in his father; Heart attack in his mother; Heart disease in his father and mother; Hypertension in his father.  Social history:  reports that he has been smoking cigarettes. He has a 35.00 pack-year smoking history. He has quit using smokeless tobacco. He reports that he does not currently use alcohol. He reports that he does not use drugs.    Review of Systems   Cardiovascular: Negative for chest pain.   Gastrointestinal: Positive for constipation.   Genitourinary: Negative for dysuria, hematuria and penile pain.        Physical Exam  Vitals and nursing note reviewed.   Constitutional:       General: He is not in acute distress.     Appearance: Normal appearance. He is well-developed and well-groomed. He is not ill-appearing.      Comments: Ambulates without difficulty   Cardiovascular:      Rate and Rhythm: Normal rate.   Pulmonary:      Effort: Pulmonary effort is normal.      Breath sounds: Normal air entry.   Abdominal:      Tenderness: There is no abdominal tenderness. There is no guarding or rebound.      Hernia: There is no hernia in the left inguinal area or right inguinal area.   Genitourinary:     Penis: Circumcised.       Testes:         Right: Mass or tenderness not present.         Left: Mass or tenderness not present.      Epididymis:      Right: Not inflamed. No tenderness.      Left: Not inflamed. No tenderness.      Comments:  Prostate 25g normal consistency nontender. Left epididymal cyst nontender and unchanged.Urethral  Meatus small  Musculoskeletal:         General: Normal range of motion.   Lymphadenopathy:      Lower Body: No right inguinal adenopathy. No left inguinal adenopathy.   Skin:     General: Skin is warm and dry.   Neurological:      Mental Status: He is alert and oriented to person, place, and time.      Motor: Motor function is intact.   Psychiatric:         Mood and Affect: Mood normal.         Behavior: Behavior normal. Behavior is cooperative.         Thought Content: Thought content normal.         Judgment: Judgment normal.        Result Review :   The following data was reviewed by: SVETLANA Roberto on 01/25/2023:    CBC AND DIFFERENTIAL (12/29/2022 12:45)  LIPID PANEL (12/29/2022 12:45)  LIPID PANEL (12/29/2022 12:45)  TSH (12/29/2022 12:45)  TestT+TestF+SHBG (05/23/2022 07:45)                   Most Recent A1C    HGBA1C Most Recent 12/29/22   Hemoglobin A1C 9.0 (A)   (A) Abnormal value                  Component   Ref Range & Units 3 wk ago   Prostate Specific Antigen   0.0 - 4.0 ng/mL 1.39    Comment: (note)   Testing performed on the Claudia Pro. Should not be   interpreted as evidence for the presence   or absence of malignancy.   Resulting Agency [facility]   Specimen Collected: 12/29/22 12:45 Last Resulted: 12/29/22 17:20   Received From: [facility]           Assessment and Plan    Diagnoses and all orders for this visit:    1. Hypogonadism in male (Primary)  -     POC Urinalysis Dipstick, Automated  -     Testosterone Cypionate (DEPOTESTOTERONE CYPIONATE) 200 MG/ML injection; INJECT 1 ML INTO THE MUSCLE EVERY 14 DAYS.  Dispense: 2 mL; Refill: 0    2. Medication monitoring encounter    3. Erectile dysfunction, unspecified erectile dysfunction type    4. Benign prostatic hyperplasia without lower urinary tract symptoms    5. Chronic constipation  -     senna (SENOKOT) 8.6 MG  tablet; Take 1 tablet by mouth 2 (Two) Times a Day for 30 days. Decrease to daily as needed if develops loose stools  Dispense: 60 tablet; Refill: 6    6. Obstructive sleep apnea      Results for orders placed or performed in visit on 01/25/23   POC Urinalysis Dipstick, Automated    Specimen: Urine   Result Value Ref Range    Color Yellow Yellow, Straw, Dark Yellow, Bessie    Clarity, UA Clear Clear    Specific Gravity  1.030 1.005 - 1.030    pH, Urine 6.0 5.0 - 8.0    Leukocytes Negative Negative    Nitrite, UA Negative Negative    Protein, POC 30 mg/dL (A) Negative mg/dL    Glucose,  mg/dL (A) Negative mg/dL    Ketones, UA Negative Negative    Urobilinogen, UA 0.2 E.U./dL Normal, 0.2 E.U./dL    Bilirubin Negative Negative    Blood, UA Negative Negative    Lot Number [id number]     Expiration Date 8/31/2023         GERI performed. Vikas reviewed. Labs level up to date. HCT and psa level wnl. Testosterone panel will schedule next visit. No changes in testosterone dosing and level therapeutic range. Continue testosterone injections 200mg im q 14 days. 2 vials for 28 day supply refilled. Urine drug screen per pain management reviewed.  Try senna for chronic constipation. Follow up 3 months for follow up visit.     Follow Up   No follow-ups on file.  Patient was given instructions and counseling regarding his condition or for health maintenance advice. Please see specific information pulled into the AVS if appropriate.     SVETLANA Roberto

## 2023-02-24 NOTE — PROGRESS NOTES
Progress Note      Patient Name: Jeronimo Phillips   Patient ID: [id number]   Sex: Male   YOB: 1965    Primary Care Provider: Danny Mccartney   Referring Provider: Ran Walsh MD    Visit Date: August 11, 2020    Provider: Adri Negron PA-C   Location: [facility]   Location Address: [address] [place] [address]  [id number]   Location Phone: [phone]          Chief Complaint     Patient is being seen today for follow up from EMG/NCV.       History Of Present Illness     EMG/NCV showed borderline carpal tunnel.  Dr. Saleem referred him to Dr. Stratton at [facility] for possible brachial neuritis.  He is having ongoing arm weakness/paresthesias continue.  He is also wanting imaging of the lumbar spine due to low back pain and right leg pain and down to the knee.  Last MRI was in 2012 that showed ddd at L4/5 and L5/S1 with disc protrusions.       Past Medical History  Arthritis; Back Pain; BPH (benign prostatic hyperplasia); Cervical disc degeneration; Cervical radiculitis; Cervical spinal stenosis; Cervical strain; Chest pain; Degenerative cervical disc; Diabetes; ED (erectile dysfunction) of organic origin; Elevated cholesterol; h/o Arthritis; Headache; Heart Attack; Heart disease; HTN (hypertension); Hyperlipemia; Hyperlipidemia; Hypertension; Hypogonadism male; Hypothyroidism; Limb Swelling; Lumb / LS disc degeneration; Meatal Stenosis; MI (myocardial infarction); Neck pain; Neck pain; Reflux; Seasonal allergies; Shortness of Breath; Sleep Apnea; Thyroid Disease; Thyroid disorder         Past Surgical History  Bowel resection; cardiac stents; Cervical Laminectomy; Cholecystectomy; Colonoscopy; Cystoscopy; Gallbladder; heart surgery; Inguinal Hernia Repair; Joint Surgery; Knee repair; Neck; Stomach surgery; Vasectomy         Medication List  amlodipine 10 mg oral tablet; aspirin 81 mg oral tablet,delayed release (DR/EC); Bystolic 5 mg oral tablet; Crestor 40 mg oral tablet;  cyclobenzaprine 10 mg oral tablet; gabapentin 800 mg oral tablet; levothyroxine 200 mcg Oral Tablet; lisinopril 5 mg oral tablet; metformin 500 mg oral tablet; nabumetone 750 mg oral tablet; Nexium 40 mg oral capsule,delayed release(DR/EC); oxycodone-acetaminophen 7.5-325 mg oral tablet; testosterone cypionate 200 mg/mL intramuscular oil; Vitamin D2 50,000 unit oral capsule; Vitamin D3 1,000 unit oral tablet         Allergy List  Bactrim; Bactrim DS; Plavix       Allergies Reconciled  Family Medical History  Heart Disease; Diabetes, unspecified type; Hypertension; Heart Attack (MI)         Social History  Alcohol (Current some day); ; lives with spouse; .; Recreational Drug Use (Never); Tobacco (Current every day); Working         Review of Systems  · Constitutional  o Denies  o : chills, excessive sweating, fatigue, fever, sycope/passing out, weight gain, weight loss  · Eyes  o Denies  o : changes in vision, blurry vision, double vision  · HENT  o Denies  o : loss of hearing, ringing in the ears, ear aches, sore throat, nasal congestion, sinus pain, nose bleeds, seasonal allergies  · Cardiovascular  o Denies  o : blood clots, swollen legs, anemia, easy burising or bleeding, transfusions  · Respiratory  o Denies  o : shortness of breath, dry cough, productive cough, pneumonia, COPD  · Gastrointestinal  o Denies  o : difficulty swallowing, reflux  · Genitourinary  o Denies  o : incontinence  · Neurologic  o Denies  o : headache, seizure, stroke, tremor, loss of balance, falls, dizziness/vertigo, difficulty with sleep, numbness/tingling/paresthesia , difficulty with coordination, difficulty with dexterity, weakness  · Musculoskeletal  o Admits  o : neck stiffness/pain, muscle aches, joint pain, weakness, pain radiating in arm, pain radiating in leg, low back pain, sciatica  · Endocrine  o Admits  o : diabetes  o Denies  o : thyroid disorder  · Psychiatric  o Denies  o : anxiety, depression  · All Others  "Negative      Vitals  Date Time BP Position Site L\R Cuff Size HR RR TEMP (F) WT  HT  BMI kg/m2 BSA m2 O2 Sat HC       08/11/2020 01:28 PM        98 255lbs 0oz 5'  9\" 37.66 2.37           Physical Examination  · Constitutional  o Appearance  o : well-nourished, well developed, alert, in no acute distress  · Respiratory  o Respiratory Effort  o : breathing unlabored  · Cardiovascular  o Peripheral Vascular System  o :   § Extremities  § : no cyanosis, clubbing or edema; less than 2 second refill noted  · Neurologic  o Mental Status Examination  o :   § Orientation  § : grossly oriented to person, place and time  o Motor Examination  o :   § RLE Strength  § : strength normal  § RLE Motor Function  § : tone normal, no atrophy, no abnormal movements noted  § LLE Strength  § : strength normal  § LLE Motor Function  § : tone normal, no atrophy, no abnormal movements noted  o Reflexes  o :   § RLE  § : 1/4  § LLE  § : 1/4  o Sensation  o :   § Light Touch  § : sensation intact to light touch in extremities  o Gait and Station  o :   § Gait Screening  § : normal gait  · Psychiatric  o Mood and Affect  o : mood normal, affect appropriate          Assessment  · Cervicalgia     723.1/M54.2  · Cervical radiculopathy     723.4/M54.12  · Cervical disc degeneration     722.4/M50.30  · Cervical disc disorder     722.91/M50.90  C6/7 left and C7/T1 right paracentral disc protrusions  · Cervical spinal stenosis     723.0/M48.02  Stable with laminectomy. Signal change preop.  · Trigger finger     727.03/M65.30  left small finger  · Low back pain     724.2/M54.5  · Lumbar radiculopathy     724.4/M54.16    Problems Reconciled  Plan  · Orders  o MRI lumbar spine wo contrast (90067) - 724.2/M54.5, 724.4/M54.16 - 08/11/2020   LASHAE  · Medications  o Medications have been Reconciled  o Transition of Care or Provider Policy  · Instructions  o I will keep him off work for the next month while working [facility] further [facility]  for brachial " neuritis. F/U after MRI lumbar spine to discuss results.             Electronically Signed by: Adri Negron PA-C -Author on August 11, 2020 01:53:24 PM   Rhombic Flap Text: Due to geometric and functional constraints, a flap reconstruction was performed to reconstruct the defect. To that end, adjacent tissue was incised and carried over to close the defect in the following manner: The defect edges were debeveled with a #15 scalpel blade.  Given the location of the defect and the proximity to free margins a rhombic flap was deemed most appropriate.  Using a sterile surgical marker, an appropriate rhombic flap was drawn incorporating the defect.    The area thus outlined was incised deep to adipose tissue with a #15 scalpel blade.  The skin margins were undermined to an appropriate distance in all directions utilizing iris scissors.

## 2023-03-08 ENCOUNTER — TELEPHONE (OUTPATIENT)
Dept: UROLOGY | Facility: CLINIC | Age: 58
End: 2023-03-08

## 2023-03-08 ENCOUNTER — OFFICE VISIT (OUTPATIENT)
Dept: UROLOGY | Facility: CLINIC | Age: 58
End: 2023-03-08
Payer: COMMERCIAL

## 2023-03-08 VITALS
HEART RATE: 90 BPM | HEIGHT: 69 IN | TEMPERATURE: 98.7 F | SYSTOLIC BLOOD PRESSURE: 130 MMHG | DIASTOLIC BLOOD PRESSURE: 75 MMHG | WEIGHT: 258 LBS | BODY MASS INDEX: 38.21 KG/M2

## 2023-03-08 DIAGNOSIS — N50.9 SCROTAL SKIN LESION: Primary | ICD-10-CM

## 2023-03-08 DIAGNOSIS — E29.1 HYPOGONADISM IN MALE: ICD-10-CM

## 2023-03-08 DIAGNOSIS — Z86.39 HISTORY OF TYPE 2 DIABETES MELLITUS: ICD-10-CM

## 2023-03-08 DIAGNOSIS — N50.82 SCROTAL PAIN: ICD-10-CM

## 2023-03-08 PROBLEM — N50.819 PAIN IN TESTICLE: Status: ACTIVE | Noted: 2023-03-08

## 2023-03-08 LAB
BILIRUB BLD-MCNC: NEGATIVE MG/DL
CLARITY, POC: CLEAR
COLOR UR: YELLOW
EXPIRATION DATE: ABNORMAL
GLUCOSE UR STRIP-MCNC: ABNORMAL MG/DL
KETONES UR QL: ABNORMAL
LEUKOCYTE EST, POC: NEGATIVE
Lab: ABNORMAL
NITRITE UR-MCNC: NEGATIVE MG/ML
PH UR: 5.5 [PH] (ref 5–8)
PROT UR STRIP-MCNC: ABNORMAL MG/DL
RBC # UR STRIP: NEGATIVE /UL
SP GR UR: 1.02 (ref 1–1.03)
UROBILINOGEN UR QL: ABNORMAL

## 2023-03-08 PROCEDURE — 99214 OFFICE O/P EST MOD 30 MIN: CPT | Performed by: NURSE PRACTITIONER

## 2023-03-08 PROCEDURE — 96372 THER/PROPH/DIAG INJ SC/IM: CPT | Performed by: NURSE PRACTITIONER

## 2023-03-08 PROCEDURE — 81003 URINALYSIS AUTO W/O SCOPE: CPT | Performed by: NURSE PRACTITIONER

## 2023-03-08 PROCEDURE — 87086 URINE CULTURE/COLONY COUNT: CPT | Performed by: NURSE PRACTITIONER

## 2023-03-08 RX ORDER — CEFTRIAXONE 1 G/1
1 INJECTION, POWDER, FOR SOLUTION INTRAMUSCULAR; INTRAVENOUS ONCE
Status: COMPLETED | OUTPATIENT
Start: 2023-03-08 | End: 2023-03-09

## 2023-03-08 RX ORDER — DOXYCYCLINE HYCLATE 100 MG/1
100 CAPSULE ORAL 2 TIMES DAILY
Qty: 14 CAPSULE | Refills: 0 | Status: SHIPPED | OUTPATIENT
Start: 2023-03-08 | End: 2023-03-22

## 2023-03-08 NOTE — PROGRESS NOTES
"Chief Complaint  Pain and soreness in scrotum x 1 week (12/29/22   1.39 )    Subjective          Jeronimo Phillips presents to [facility] UROLOGY  History of Present Illness  Patient calling office with complaints of pain in his scrotal area.  He states he had a spot come up a week ago and it has enlarged and very sore.  He can feel the area but cannot visualize.  It is in his scrotum behind his testicles.  Denies any fever chills and no drainage.  He has had something like that before but normally went away.  He does admit to sitting a lot.  He is not sure if it is a cyst but bigger area than he has had in the past.  Described as really sore.  Denies any changes in urination.  No dysuria or gross hematuria.  He also notes the area that he has on his coccyx region is greatly enlarged and red.  Previously noted very small nodular area without redness.  Patient states he has an appointment with his dermatologist tomorrow.        Objective   Vital Signs:   /75   Pulse 90   Temp 98.7 °F (37.1 °C)   Ht 175.3 cm (69\")   Wt 117 kg (258 lb)   BMI 38.10 kg/m²     Allergies   Allergen Reactions   • Clopidogrel Bisulfate Unknown - High Severity   • Plavix [Clopidogrel] Hives   • Sulfamethoxazole-Trimethoprim Hives   • Batroxobin Unknown - High Severity      Past medical history:  has a past medical history of Acid reflux, Arthritis, Back pain, BPH (benign prostatic hyperplasia), Cervical spinal stenosis (07/09/2015), Cervical strain, Chest pain, Condition not found, Coronary artery disease, Degeneration of lumbar intervertebral disc (03/12/2015), Degenerative cervical disc (11/17/2014), Diabetes (HCC) (06/03/2021), ED (erectile dysfunction), Elevated cholesterol, Encounter for medication monitoring (06/03/2021), Erectile dysfunction (2010), Headache, Heart attack (HCC), Heart disease, History of arthritis, HLD (hyperlipidemia), HTN (hypertension), Hyperlipemia, Hypogonadism male, Hypothyroid, Limb " swelling, MI (myocardial infarction) (HCC), Neck pain, Other cervical disc degeneration, unspecified cervical region (07/02/2015), Seasonal allergies, Sleep apnea, SOB (shortness of breath), Thyroid disease, Thyroid disorder, and Urinary incontinence (2022).   Past surgical history:  has a past surgical history that includes Bowel resection; Coronary angioplasty with stent (2010); Cervical laminectomy (08/24/2015); Cholecystectomy; Colonoscopy (2012); Cystoscopy; Gallbladder surgery; Cardiac surgery; Inguinal hernia repair (Left); Knee surgery (Right, 01/15/2020); Lumbar discectomy (Right, 10/14/2020); Neck surgery; Stomach surgery; Vasectomy; and Band hemorrhoidectomy.  Personal history: family history includes Diabetes in his father; Heart attack in his mother; Heart disease in his father and mother; Hypertension in his father.  Social history:  reports that he has been smoking cigarettes. He has a 35.00 pack-year smoking history. He has quit using smokeless tobacco. He reports that he does not currently use alcohol. He reports that he does not use drugs.    Review of Systems   Constitutional: Negative for chills and fever.        Physical Exam  Vitals and nursing note reviewed.   Constitutional:       General: He is awake. He is not in acute distress.     Appearance: He is well-developed. He is not ill-appearing.      Comments: Ambulates without difficulty   Cardiovascular:      Rate and Rhythm: Normal rate.   Pulmonary:      Effort: Pulmonary effort is normal.   Abdominal:      General: There is no distension.      Palpations: There is no mass.      Tenderness: There is no abdominal tenderness. There is no rebound.   Genitourinary:     Penis: No erythema, tenderness, discharge or lesions.       Testes:         Right: Mass or tenderness not present.         Left: Mass or tenderness not present.      Epididymis:      Right: Not inflamed. No tenderness.      Left: Not inflamed. No tenderness.      Prostate: No  nodules present.          Comments: Raised hardened region  scrotal skin perineal region. Mildly tender Testicles nontender.   general inflammation and thickening of skin. without drainage or visible central region.Inflammed Lesion with same appearance coccyx region. Previous visit area very small without any inflammation Tiny pore indentation without drainage. Estimated 1.5 cm x 1cm  Musculoskeletal:         General: Normal range of motion.   Lymphadenopathy:      Lower Body: No right inguinal adenopathy. No left inguinal adenopathy.   Skin:         Neurological:      Mental Status: He is oriented to person, place, and time.   Psychiatric:         Mood and Affect: Mood normal.         Behavior: Behavior normal. Behavior is cooperative.         Thought Content: Thought content normal.         Judgment: Judgment normal.        Result Review :                 Assessment and Plan    Diagnoses and all orders for this visit:    1. Scrotal pain (Primary)  -     POC Urinalysis Dipstick, Automated  -     Urine Culture - Urine, Urine, Random Void  -     US Scrotum & Testicles; Future    2. Hypogonadism in male    3. Scrotal skin lesion    4. History of type 2 diabetes mellitus      Results for orders placed or performed in visit on 03/08/23   POC Urinalysis Dipstick, Automated    Specimen: Urine   Result Value Ref Range    Color Yellow Yellow, Straw, Dark Yellow, Bessie    Clarity, UA Clear Clear    Specific Gravity  1.025 1.005 - 1.030    pH, Urine 5.5 5.0 - 8.0    Leukocytes Negative Negative    Nitrite, UA Negative Negative    Protein, POC 30 mg/dL (A) Negative mg/dL    Glucose, UA >=1000 mg/dL (3+) (A) Negative mg/dL    Ketones, UA 15 mg/dL (A) Negative    Urobilinogen, UA 0.2 E.U./dL Normal, 0.2 E.U./dL    Bilirubin Negative Negative    Blood, UA Negative Negative    Lot Number [id number]     Expiration Date 4/30/2024     provided with rocephin 1 g im in office and rx doxycycline. Schedule scrotal ultrasound for tomorrow to  be be certain not scrotal abcess. follow up next week  See sooner dependent on results. Patient also also has inflammation and enlargement of lesion present on coccyx. Unsure if infected pilonidal cyst with a sinus tract or if two separate lesion coincidentally present and same appearance. Patient states has appointment with dermatologist tomorrow to evaluate lesion on coccyx. Urine sent for culture. Patient with diabetes not adequately  controlled.     Follow Up   No follow-ups on file.  Patient was given instructions and counseling regarding his condition or for health maintenance advice. Please see specific information pulled into the AVS if appropriate.     Erika Diehl, APRN

## 2023-03-08 NOTE — TELEPHONE ENCOUNTER
Provider: JOSE CLEVELAND  Caller: ALVERTO IRWIN  Relationship to Patient: SELF    Reason for Call: PT CALLED BACK TO MOVE UP FOLLOW-UP APPT TO 3/15/23.  RESCHEDULED PT TO 3/15/23, PT IS HAVING TESTISCLE PAIN, AND FEELS THERE MAY BE A GROWTH. PLEASE REVIEW CHART AND CONFIRM IF LABS OR SCANS ARE NEEDED.  When was the patient last seen: 1/25/23    PLEASE PLACE ORDERS IF NEEDED, AND CALL PT TO CONFIRM.

## 2023-03-09 ENCOUNTER — TELEPHONE (OUTPATIENT)
Dept: UROLOGY | Facility: CLINIC | Age: 58
End: 2023-03-09
Payer: COMMERCIAL

## 2023-03-09 ENCOUNTER — HOSPITAL ENCOUNTER (OUTPATIENT)
Dept: ULTRASOUND IMAGING | Facility: HOSPITAL | Age: 58
Discharge: HOME | End: 2023-03-09
Admitting: NURSE PRACTITIONER
Payer: COMMERCIAL

## 2023-03-09 DIAGNOSIS — N50.82 SCROTAL PAIN: ICD-10-CM

## 2023-03-09 LAB — BACTERIA SPEC AEROBE CULT: NO GROWTH

## 2023-03-09 PROCEDURE — 76870 US EXAM SCROTUM: CPT

## 2023-03-09 RX ADMIN — CEFTRIAXONE 1 G: 1 INJECTION, POWDER, FOR SOLUTION INTRAMUSCULAR; INTRAVENOUS at 09:13

## 2023-03-09 NOTE — TELEPHONE ENCOUNTER
Hub to read     Left message for patient to call back.  Erika is asking what cyst he is seeing Dr. Mireles at general surgery regarding.

## 2023-03-10 ENCOUNTER — TELEPHONE (OUTPATIENT)
Dept: UROLOGY | Facility: CLINIC | Age: 58
End: 2023-03-10
Payer: COMMERCIAL

## 2023-03-10 ENCOUNTER — OFFICE VISIT (OUTPATIENT)
Dept: SURGERY | Facility: CLINIC | Age: 58
End: 2023-03-10
Payer: COMMERCIAL

## 2023-03-10 VITALS — BODY MASS INDEX: 37.47 KG/M2 | HEIGHT: 69 IN | WEIGHT: 253 LBS | RESPIRATION RATE: 16 BRPM

## 2023-03-10 DIAGNOSIS — L05.01 PILONIDAL ABSCESS: Primary | ICD-10-CM

## 2023-03-10 PROCEDURE — 10081 I&D PILONIDAL CYST COMP: CPT | Performed by: SURGERY

## 2023-03-10 PROCEDURE — 87075 CULTR BACTERIA EXCEPT BLOOD: CPT | Performed by: SURGERY

## 2023-03-10 PROCEDURE — 87070 CULTURE OTHR SPECIMN AEROBIC: CPT | Performed by: SURGERY

## 2023-03-10 PROCEDURE — 87205 SMEAR GRAM STAIN: CPT | Performed by: SURGERY

## 2023-03-10 NOTE — TELEPHONE ENCOUNTER
Patient returned call stateing that he is seeing Dr. Espinal for the cyst on him bottom. He then asked what Erika Diehl's plan was for the cyst on his scrotum. Patient requested a call back from medical staff with more information on his CT scan.

## 2023-03-10 NOTE — PROGRESS NOTES
Preoperative diagnosis:  Diagnoses and all orders for this visit:    1. Pilonidal abscess (Primary)  -     Wound Culture - Wound, Buttock, Left; Future  -     Anaerobic Culture - Swab, Buttock, Left; Future  -     Wound Culture - Wound, Buttock, Left  -     Anaerobic Culture - Swab, Buttock, Left         Postoperative diagnosis:  Diagnoses and all orders for this visit:    1. Pilonidal abscess (Primary)  -     Wound Culture - Wound, Buttock, Left; Future  -     Anaerobic Culture - Swab, Buttock, Left; Future  -     Wound Culture - Wound, Buttock, Left  -     Anaerobic Culture - Swab, Buttock, Left    Procedure:  Incision and drainage     Surgeon:  Kieran Mireles M.D.     Anesthesia:  2 ml  Lidocaine     Assistant:  ASMITA Prescott     EBL:  Minimal     Complications:  None      Indications:  57 y.o. male self referral for a painful and swollen area at this upper midline roddy cleft.  No fevers.  No drainage.  the is the first time patient has had a problem at this area.  On exam, at the  cleft, just to the right side, there is an area of swelling, erythema and tenderness.      Findings:  Approximately 10 ml purulent fluid drained.     Technique: Patient was taken to the procedure room and placed appropriately on the procedure table.  Consent had already been obtained.  The targeted area at the roddy cleft was prepped and draped in the usual fashion.  Two mLs of lidocaine was injected into the skin.  An 11 blade knife was used to fashion an incision about one cm in length and about 10 mL of purulent fluid drained.  A sample of the fluid was collected and sent for cultures.  The wound was irrigated and an appropriate dressing was placed.  Adequate hemostasis.  No complications.     Follow-up:  Wound care instructions provided verbally.  Follow-up with me PRN.    Kieran Mireles MD  03/10/2023    Electronically signed by Kieran Mireles MD, 03/10/23, 5:10 PM EST.

## 2023-03-13 ENCOUNTER — OFFICE VISIT (OUTPATIENT)
Dept: UROLOGY | Facility: CLINIC | Age: 58
End: 2023-03-13
Payer: COMMERCIAL

## 2023-03-13 VITALS
WEIGHT: 253 LBS | TEMPERATURE: 98 F | DIASTOLIC BLOOD PRESSURE: 64 MMHG | SYSTOLIC BLOOD PRESSURE: 103 MMHG | BODY MASS INDEX: 37.47 KG/M2 | HEIGHT: 69 IN | HEART RATE: 83 BPM

## 2023-03-13 DIAGNOSIS — Z86.39 HISTORY OF TYPE 2 DIABETES MELLITUS: ICD-10-CM

## 2023-03-13 DIAGNOSIS — L72.3 SEBACEOUS CYST: ICD-10-CM

## 2023-03-13 DIAGNOSIS — N50.9 SCROTAL LESION: Primary | ICD-10-CM

## 2023-03-13 DIAGNOSIS — N50.9 SCROTAL LESION: ICD-10-CM

## 2023-03-13 PROBLEM — N50.819 PAIN IN TESTICLE: Status: RESOLVED | Noted: 2023-03-08 | Resolved: 2023-03-13

## 2023-03-13 LAB
BACTERIA SPEC AEROBE CULT: NORMAL
BILIRUB BLD-MCNC: NEGATIVE MG/DL
CLARITY, POC: CLEAR
COLOR UR: ABNORMAL
GLUCOSE UR STRIP-MCNC: ABNORMAL MG/DL
GRAM STN SPEC: NORMAL
GRAM STN SPEC: NORMAL
KETONES UR QL: NEGATIVE
LEUKOCYTE EST, POC: NEGATIVE
NITRITE UR-MCNC: NEGATIVE MG/ML
PH UR: 5.5 [PH] (ref 5–8)
PROT UR STRIP-MCNC: ABNORMAL MG/DL
RBC # UR STRIP: NEGATIVE /UL
SP GR UR: 1.03 (ref 1–1.03)
UROBILINOGEN UR QL: ABNORMAL

## 2023-03-13 PROCEDURE — 99214 OFFICE O/P EST MOD 30 MIN: CPT | Performed by: NURSE PRACTITIONER

## 2023-03-13 PROCEDURE — 87205 SMEAR GRAM STAIN: CPT | Performed by: NURSE PRACTITIONER

## 2023-03-13 PROCEDURE — 87070 CULTURE OTHR SPECIMN AEROBIC: CPT | Performed by: NURSE PRACTITIONER

## 2023-03-13 PROCEDURE — 81002 URINALYSIS NONAUTO W/O SCOPE: CPT | Performed by: NURSE PRACTITIONER

## 2023-03-13 RX ORDER — OXYCODONE AND ACETAMINOPHEN 7.5; 325 MG/1; MG/1
TABLET ORAL EVERY 4 HOURS
COMMUNITY

## 2023-03-13 NOTE — PROGRESS NOTES
"Chief Complaint  Follow-up scrotal ultrasound and scrotal abcess    Subjective          Jeronimo Phillips presents to [facility] UROLOGY  History of Present Illness  Follow up of scrotal ultrasound results 3/9/23 for scrotal lesion. Possible small abcess per ultrasound. Today area of concern is smaller and now circumscribed firm nodule approximately 1.0 cm circumference. Inflamed sebaceous cyst and slightly tender.  Surrounding soft issue no longer edematous and hyperemic as before. Patient is still taking doxycycline.No increase warmth or drainage. Area is better but still easily irritated from walking at work.  Patient  Reports having I&D of  pilonidal cyst per Dr Mireles on Friday. Moderate amount of purulent discolored drainage and doing irrigation of wound at home. No follow up appointment was scheduled. States told that area not related to the scrotal cyst. No suspected drainage tract per patient understanding.          Scrotal ultrasound indicated IMPRESSION: Copy                 1. Both testicles and epididymis appear within normal limits.  2. Along palpable area of concern is hyperemic edematous soft tissue with a 1.4 cm hypoechoic   focus, which could represent a small abscess in the correct clinical setting          Needle aspiration per Dr Doherty with 18g needle and syringe. No Aspirate return. Using needle, small blunt incision to area and swab of area with bloody drainage sent for culture. No purulent discharge.        Objective   Vital Signs:   /64   Pulse 83   Temp 98 °F (36.7 °C)   Ht 175.3 cm (69\")   Wt 115 kg (253 lb)   BMI 37.36 kg/m²     Allergies   Allergen Reactions   • Clopidogrel Bisulfate Unknown - High Severity   • Plavix [Clopidogrel] Hives   • Sulfamethoxazole-Trimethoprim Hives   • Batroxobin Unknown - High Severity      Past medical history:  has a past medical history of Acid reflux, Arthritis, Back pain, BPH (benign prostatic hyperplasia), Cervical spinal " stenosis (07/09/2015), Cervical strain, Chest pain, Condition not found, Coronary artery disease, Degeneration of lumbar intervertebral disc (03/12/2015), Degenerative cervical disc (11/17/2014), Diabetes (HCC) (06/03/2021), ED (erectile dysfunction), Elevated cholesterol, Encounter for medication monitoring (06/03/2021), Erectile dysfunction (2010), Headache, Heart attack (HCC), Heart disease, History of arthritis, HLD (hyperlipidemia), HTN (hypertension), Hyperlipemia, Hypogonadism male, Hypothyroid, Limb swelling, MI (myocardial infarction) ([facility]), Neck pain, Other cervical disc degeneration, unspecified cervical region (07/02/2015), Seasonal allergies, Sleep apnea, SOB (shortness of breath), Thyroid disease, Thyroid disorder, and Urinary incontinence (2022).   Past surgical history:  has a past surgical history that includes Bowel resection; Coronary angioplasty with stent (2010); Cervical laminectomy (08/24/2015); Cholecystectomy; Colonoscopy (2012); Cystoscopy; Gallbladder surgery; Cardiac surgery; Inguinal hernia repair (Left); Knee surgery (Right, 01/15/2020); Lumbar discectomy (Right, 10/14/2020); Neck surgery; Stomach surgery; Vasectomy; and Band hemorrhoidectomy.  Personal history: family history includes Diabetes in his father; Heart attack in his mother; Heart disease in his father and mother; Hypertension in his father.  Social history:  reports that he has been smoking cigarettes. He has a 35.00 pack-year smoking history. He has quit using smokeless tobacco. He reports that he does not currently use alcohol. He reports that he does not use drugs.    Review of Systems   Constitutional: Negative for chills and fever.        Physical Exam  Vitals and nursing note reviewed.   Constitutional:       General: He is not in acute distress.     Appearance: Normal appearance. He is well-developed. He is not ill-appearing.      Comments: Ambulates without difficulty   Cardiovascular:      Rate and Rhythm: Normal  rate.   Pulmonary:      Effort: Pulmonary effort is normal.      Breath sounds: Normal air entry.   Genitourinary:     Comments: Dressing present coccyx region. Area of scrotum more circumscribed. Inflamed localized sebaceous cyst. 1.0 cm   Musculoskeletal:         General: Normal range of motion.   Skin:     General: Skin is warm and dry.   Neurological:      Mental Status: He is alert and oriented to person, place, and time.      Motor: Motor function is intact.   Psychiatric:         Mood and Affect: Mood normal.         Behavior: Behavior normal. Behavior is cooperative.         Thought Content: Thought content normal.         Judgment: Judgment normal.        Result Review :   The following data was reviewed by: SVETLANA Roberto on 03/13/2023:    Data reviewed: Radiologic studies general surgeon and Consultant notes scrotal ultrasound     Office Visit with Kieran Mireles MD (03/10/2023)       US Scrotum & Testicles (03/09/2023 15:01)    Assessment and Plan    Diagnoses and all orders for this visit:    1. Scrotal lesion (Primary)  -     POC Urinalysis Dipstick  -     Wound Culture - Aspirate, Scrotum; Future    2. Sebaceous cyst  -     Wound Culture - Aspirate, Scrotum; Future      Results for orders placed or performed in visit on 03/13/23   POC Urinalysis Dipstick    Specimen: Urine   Result Value Ref Range    Color Dark Yellow Yellow, Straw, Dark Yellow, Bessie    Clarity, UA Clear Clear    Glucose, UA >=1000 mg/dL (3+) (A) Negative mg/dL    Bilirubin Negative Negative    Ketones, UA Negative Negative    Specific Gravity  1.030 1.005 - 1.030    Blood, UA Negative Negative    pH, Urine 5.5 5.0 - 8.0    Protein, POC 30 mg/dL (A) Negative mg/dL    Urobilinogen, UA 0.2 E.U./dL Normal, 0.2 E.U./dL    Leukocytes Negative Negative    Nitrite, UA Negative Negative      Continue doxycycline and swab of bloody drainage from needle puncture wound sent for culture. Instructions for care provided along with  hibiclens to cleanse area when showering and after work. To apply dry guaze over scrotal lesion to protect area. Will have follow back up Thursday for recheck.  Patient to contact surgical specialist for his follow up appointment with Dr Mireles and results s/p I&D buttock pilonidal efrem.   I spent 30   minutes caring for Jeronimo on this date of service. This time includes time spent by me in the following activities:preparing for the visit, reviewing tests, obtaining and/or reviewing a separately obtained history, performing a medically appropriate examination and/or evaluation , counseling and educating the patient/family/caregiver, ordering medications, tests, or procedures, documenting information in the medical record and would culture swab, minor procedure dr ritter present   Follow Up   No follow-ups on file.  Patient was given instructions and counseling regarding his condition or for health maintenance advice. Please see specific information pulled into the AVS if appropriate.     Erika Diehl, SVETLANA

## 2023-03-14 DIAGNOSIS — E29.1 HYPOGONADISM IN MALE: ICD-10-CM

## 2023-03-14 RX ORDER — TESTOSTERONE CYPIONATE 200 MG/ML
INJECTION, SOLUTION INTRAMUSCULAR
Qty: 2 ML | Refills: 0 | Status: SHIPPED | OUTPATIENT
Start: 2023-03-14

## 2023-03-14 RX ORDER — NEEDLES, DISPOSABLE 25GX5/8"
NEEDLE, DISPOSABLE MISCELLANEOUS
Qty: 12 EACH | Refills: 0 | Status: SHIPPED | OUTPATIENT
Start: 2023-03-14

## 2023-03-15 LAB — BACTERIA SPEC ANAEROBE CULT: NORMAL

## 2023-03-16 LAB
BACTERIA SPEC AEROBE CULT: NORMAL
GRAM STN SPEC: NORMAL

## 2023-04-11 DIAGNOSIS — E29.1 HYPOGONADISM IN MALE: ICD-10-CM

## 2023-04-12 RX ORDER — TESTOSTERONE CYPIONATE 200 MG/ML
INJECTION, SOLUTION INTRAMUSCULAR
Qty: 2 ML | Refills: 0 | Status: SHIPPED | OUTPATIENT
Start: 2023-04-12

## 2023-05-06 DIAGNOSIS — E29.1 HYPOGONADISM IN MALE: ICD-10-CM

## 2023-05-10 RX ORDER — TESTOSTERONE CYPIONATE 200 MG/ML
INJECTION, SOLUTION INTRAMUSCULAR
Qty: 2 ML | Refills: 0 | Status: SHIPPED | OUTPATIENT
Start: 2023-05-10

## 2023-05-31 ENCOUNTER — OFFICE VISIT (OUTPATIENT)
Dept: UROLOGY | Facility: CLINIC | Age: 58
End: 2023-05-31

## 2023-05-31 VITALS
SYSTOLIC BLOOD PRESSURE: 134 MMHG | HEIGHT: 69 IN | TEMPERATURE: 97.5 F | HEART RATE: 89 BPM | DIASTOLIC BLOOD PRESSURE: 75 MMHG | WEIGHT: 263 LBS | BODY MASS INDEX: 38.95 KG/M2

## 2023-05-31 DIAGNOSIS — Z95.5 PRESENCE OF CORONARY ANGIOPLASTY IMPLANT AND GRAFT: ICD-10-CM

## 2023-05-31 DIAGNOSIS — Z86.39 HISTORY OF TYPE 2 DIABETES MELLITUS: ICD-10-CM

## 2023-05-31 DIAGNOSIS — L72.3 SEBACEOUS CYST: ICD-10-CM

## 2023-05-31 DIAGNOSIS — N40.0 BENIGN PROSTATIC HYPERPLASIA WITHOUT LOWER URINARY TRACT SYMPTOMS: ICD-10-CM

## 2023-05-31 DIAGNOSIS — E29.1 HYPOGONADISM IN MALE: Primary | ICD-10-CM

## 2023-05-31 DIAGNOSIS — Z51.81 MEDICATION MONITORING ENCOUNTER: ICD-10-CM

## 2023-05-31 PROBLEM — D64.9 LOW HEMOGLOBIN AND LOW HEMATOCRIT: Status: RESOLVED | Noted: 2022-04-13 | Resolved: 2023-05-31

## 2023-05-31 PROBLEM — N50.82 SCROTAL PAIN: Status: RESOLVED | Noted: 2023-03-08 | Resolved: 2023-05-31

## 2023-05-31 PROBLEM — N50.9 SCROTAL SKIN LESION: Status: RESOLVED | Noted: 2023-03-08 | Resolved: 2023-05-31

## 2023-05-31 LAB
BILIRUB BLD-MCNC: NEGATIVE MG/DL
CLARITY, POC: CLEAR
COLOR UR: YELLOW
GLUCOSE UR STRIP-MCNC: ABNORMAL MG/DL
KETONES UR QL: ABNORMAL
LEUKOCYTE EST, POC: NEGATIVE
NITRITE UR-MCNC: NEGATIVE MG/ML
PH UR: 6 [PH] (ref 5–8)
PROT UR STRIP-MCNC: ABNORMAL MG/DL
RBC # UR STRIP: NEGATIVE /UL
SP GR UR: 1.03 (ref 1–1.03)
UROBILINOGEN UR QL: ABNORMAL

## 2023-05-31 NOTE — LETTER
"May 31, 2023       No Recipients    Patient: Jeronimo Phillips   YOB: 1965   Date of Visit: 5/31/2023     Dear Dr. Dumont Recipients:    Thank you for referring Jeronimo Phillips to me for evaluation. Below are the relevant portions of my assessment and plan of care.    If you have questions, please do not hesitate to call me. I look forward to following Jeronimo along with you.         Sincerely,        SVETLANA Roberto        CC:   No Recipients      Progress Notes:  Chief Complaint  Hypogonadism    Subjective           Jeronimo Phillips is a 58 y.o. male who presents to [facility] UROLOG[facility]  History of Present Illness  Routine follow up hypogonadism and bph. Scrotal sebaceous cyst now resolved. Denies dysuria or gross hematuria. Adequate urine flow. Continues to have problems with chronic constipation. Testosterone injections 200 mg IM every 14 days helps to maintain energy. Following with pain management for chronic back pain. Reports that he did follow up with Cardiologist \"not that long ago and everything ok\".        Objective    Vital Signs:   /75   Pulse 89   Temp 97.5 °F (36.4 °C)   Ht 175.3 cm (69\")   Wt 119 kg (263 lb)   BMI 38.84 kg/m²     Allergies   Allergen Reactions   • Clopidogrel Bisulfate Unknown - High Severity   • Plavix [Clopidogrel] Hives   • Sulfamethoxazole-Trimethoprim Hives   • Batroxobin Unknown - High Severity      Past medical history:  has a past medical history of Acid reflux, Arthritis, Back pain, BPH (benign prostatic hyperplasia), Cervical spinal stenosis (07/09/2015), Cervical strain, Chest pain, Condition not found, Coronary artery disease, Degeneration of lumbar intervertebral disc (03/12/2015), Degenerative cervical disc (11/17/2014), Diabetes (06/03/2021), ED (erectile dysfunction), Elevated cholesterol, Encounter for medication monitoring (06/03/2021), Erectile dysfunction (2010), Headache, Heart attack, Heart disease, History of arthritis, " HLD (hyperlipidemia), HTN (hypertension), Hyperlipemia, Hypogonadism male, Hypothyroid, Limb swelling, MI (myocardial infarction), Neck pain, Other cervical disc degeneration, unspecified cervical region (07/02/2015), Seasonal allergies, Sleep apnea, SOB (shortness of breath), Thyroid disease, Thyroid disorder, and Urinary incontinence (2022).   Past surgical history:  has a past surgical history that includes Bowel resection; Coronary angioplasty with stent (2010); Cervical laminectomy (08/24/2015); Cholecystectomy; Colonoscopy (2012); Cystoscopy; Gallbladder surgery; Cardiac surgery; Inguinal hernia repair (Left); Knee surgery (Right, 01/15/2020); Lumbar discectomy (Right, 10/14/2020); Neck surgery; Stomach surgery; Vasectomy; and Band hemorrhoidectomy.  Personal history: family history includes Diabetes in his father; Heart attack in his mother; Heart disease in his father and mother; Hypertension in his father.  Social history:  reports that he has been smoking cigarettes. He has a 35.00 pack-year smoking history. He has quit using smokeless tobacco. He reports that he does not currently use alcohol. He reports that he does not use drugs.    Review of Systems   Constitutional: Negative for chills and fever.   Cardiovascular: Negative for chest pain.   Genitourinary: Negative for difficulty urinating and hematuria.        Physical Exam  Vitals and nursing note reviewed.   Constitutional:       General: He is not in acute distress.     Appearance: Normal appearance. He is well-developed. He is not ill-appearing.      Comments: Ambulates without difficulty   Cardiovascular:      Rate and Rhythm: Normal rate.      Heart sounds: Murmur heard.   Pulmonary:      Effort: Pulmonary effort is normal.      Breath sounds: Normal air entry.   Musculoskeletal:         General: Normal range of motion.   Skin:     General: Skin is warm and dry.   Neurological:      Mental Status: He is alert and oriented to person, place, and  time.      Motor: Motor function is intact.   Psychiatric:         Mood and Affect: Mood normal.         Behavior: Behavior normal. Behavior is cooperative.         Thought Content: Thought content normal.         Judgment: Judgment normal.        Result Review :   The following data was reviewed by: SVETLANA Roberto on 05/31/2023:      CBC          12/29/2022    12:45   CBC   WBC 8.16        RBC 5.22        Hemoglobin 13.7        Hematocrit 44.0        MCV 84.3        MCH 26.2        MCHC 31.1        RDW 17.0        Platelets 187            This result is from an external source.         Most Recent A1C          12/29/2022    12:45   HGBA1C Most Recent   Hemoglobin A1C 9.0            This result is from an external source.     PSA          12/29/2022    12:45   PSA   PSA 1.39            This result is from an external source.     Lab Results   Component Value Date    LH <0.30 05/23/2022      Lab Results   Component Value Date    FSH <0.30 05/23/2022      Lab Results   Component Value Date    PROLACTIN 5.88 05/23/2022      Lab Results   Component Value Date    TESTOSTEROTT 497 05/23/2022    TESTOSTEROTT 458 05/23/2022    BIOAVAILAB 321 (H) 05/23/2022    BIOAVAILAB 70.1 05/23/2022      Lab Results   Component Value Date    TESTOSTEROTT 497 05/23/2022    TESTOSTEROTT 458 05/23/2022    TESTFRE 9.7 05/23/2022    SEXMONB 15.0 (L) 05/23/2022         Results for orders placed or performed in visit on 05/31/23   POC Urinalysis Dipstick    Specimen: Urine   Result Value Ref Range    Color Yellow Yellow, Straw, Dark Yellow, Bessie    Clarity, UA Clear Clear    Glucose,  mg/dL (A) Negative mg/dL    Bilirubin Negative Negative    Ketones, UA Trace (A) Negative    Specific Gravity  1.030 1.005 - 1.030    Blood, UA Negative Negative    pH, Urine 6.0 5.0 - 8.0    Protein, POC Trace (A) Negative mg/dL    Urobilinogen, UA 0.2 E.U./dL Normal, 0.2 E.U./dL    Leukocytes Negative Negative    Nitrite, UA Negative Negative        US Scrotum & Testicles (03/09/2023 15:01)      Assessment and Plan    Diagnoses and all orders for this visit:    1. Hypogonadism in male (Primary)  -     POC Urinalysis Dipstick  -     Comprehensive Metabolic Panel; Future  -     CBC & Differential; Future  -     Luteinizing Hormone; Future  -     Follicle Stimulating Hormone; Future  -     TestT+TestF+SHBG; Future  -     Prolactin; Future  -     Estrogens, Total; Future  -     Testosterone, Bioavailable (M); Future  -     Lipid Panel; Future  -     Urine Drug Screen - Urine, Clean Catch; Future    2. History of type 2 diabetes mellitus  -     Hemoglobin A1c; Future    3. Medication monitoring encounter  -     Lipid Panel; Future  -     Hemoglobin A1c; Future  -     Urine Drug Screen - Urine, Clean Catch; Future    4. Benign prostatic hyperplasia without lower urinary tract symptoms    5. Presence of coronary angioplasty implant and graft  -     Lipid Panel; Future    6. Scrotal cyst/abcess resolved       Continue testosterone injections 200mg im q 14days. 2 vials 28 day supply. Testosterone levels have been therapeutic on same dosage for over 2 years. Vikas reviewed. Patient is due for testosterone and associated labs. Lab for ha1c and lipid panel added to avoid having additional labs. Patient reports pcp appointment sometime in June. Will have all lab results forwarded to pcp office. Routine follow up 3 months     Follow Up   No follow-ups on file.  Patient was given instructions and counseling regarding his condition or for health maintenance advice. Please see specific information pulled into the AVS if appropriate.     SVETLANA Roberto

## 2023-05-31 NOTE — PROGRESS NOTES
"Chief Complaint  Hypogonadism    Subjective          Jeronimo Phillips is a 58 y.o. male who presents to [facility] UROLOG[facility]  History of Present Illness  Routine follow up hypogonadism and bph. Scrotal sebaceous cyst now resolved. Denies dysuria or gross hematuria. Adequate urine flow. Continues to have problems with chronic constipation. Testosterone injections 200 mg IM every 14 days helps to maintain energy. Following with pain management for chronic back pain. Reports that he did follow up with Cardiologist \"not that long ago and everything ok\".        Objective   Vital Signs:   /75   Pulse 89   Temp 97.5 °F (36.4 °C)   Ht 175.3 cm (69\")   Wt 119 kg (263 lb)   BMI 38.84 kg/m²     Allergies   Allergen Reactions   • Clopidogrel Bisulfate Unknown - High Severity   • Plavix [Clopidogrel] Hives   • Sulfamethoxazole-Trimethoprim Hives   • Batroxobin Unknown - High Severity      Past medical history:  has a past medical history of Acid reflux, Arthritis, Back pain, BPH (benign prostatic hyperplasia), Cervical spinal stenosis (07/09/2015), Cervical strain, Chest pain, Condition not found, Coronary artery disease, Degeneration of lumbar intervertebral disc (03/12/2015), Degenerative cervical disc (11/17/2014), Diabetes (06/03/2021), ED (erectile dysfunction), Elevated cholesterol, Encounter for medication monitoring (06/03/2021), Erectile dysfunction (2010), Headache, Heart attack, Heart disease, History of arthritis, HLD (hyperlipidemia), HTN (hypertension), Hyperlipemia, Hypogonadism male, Hypothyroid, Limb swelling, MI (myocardial infarction), Neck pain, Other cervical disc degeneration, unspecified cervical region (07/02/2015), Seasonal allergies, Sleep apnea, SOB (shortness of breath), Thyroid disease, Thyroid disorder, and Urinary incontinence (2022).   Past surgical history:  has a past surgical history that includes Bowel resection; Coronary angioplasty with stent (2010); Cervical " laminectomy (08/24/2015); Cholecystectomy; Colonoscopy (2012); Cystoscopy; Gallbladder surgery; Cardiac surgery; Inguinal hernia repair (Left); Knee surgery (Right, 01/15/2020); Lumbar discectomy (Right, 10/14/2020); Neck surgery; Stomach surgery; Vasectomy; and Band hemorrhoidectomy.  Personal history: family history includes Diabetes in his father; Heart attack in his mother; Heart disease in his father and mother; Hypertension in his father.  Social history:  reports that he has been smoking cigarettes. He has a 35.00 pack-year smoking history. He has quit using smokeless tobacco. He reports that he does not currently use alcohol. He reports that he does not use drugs.    Review of Systems   Constitutional: Negative for chills and fever.   Cardiovascular: Negative for chest pain.   Genitourinary: Negative for difficulty urinating and hematuria.        Physical Exam  Vitals and nursing note reviewed.   Constitutional:       General: He is not in acute distress.     Appearance: Normal appearance. He is well-developed. He is not ill-appearing.      Comments: Ambulates without difficulty   Cardiovascular:      Rate and Rhythm: Normal rate.      Heart sounds: Murmur heard.   Pulmonary:      Effort: Pulmonary effort is normal.      Breath sounds: Normal air entry.   Musculoskeletal:         General: Normal range of motion.   Skin:     General: Skin is warm and dry.   Neurological:      Mental Status: He is alert and oriented to person, place, and time.      Motor: Motor function is intact.   Psychiatric:         Mood and Affect: Mood normal.         Behavior: Behavior normal. Behavior is cooperative.         Thought Content: Thought content normal.         Judgment: Judgment normal.        Result Review :   The following data was reviewed by: SVETLANA Roberto on 05/31/2023:      CBC        12/29/2022    12:45   CBC   WBC 8.16        RBC 5.22        Hemoglobin 13.7        Hematocrit 44.0        MCV 84.3         MCH 26.2        MCHC 31.1        RDW 17.0        Platelets 187            This result is from an external source.         Most Recent A1C        12/29/2022    12:45   HGBA1C Most Recent   Hemoglobin A1C 9.0            This result is from an external source.     PSA        12/29/2022    12:45   PSA   PSA 1.39            This result is from an external source.     Lab Results   Component Value Date    LH <0.30 05/23/2022      Lab Results   Component Value Date    FSH <0.30 05/23/2022      Lab Results   Component Value Date    PROLACTIN 5.88 05/23/2022      Lab Results   Component Value Date    TESTOSTEROTT 497 05/23/2022    TESTOSTEROTT 458 05/23/2022    BIOAVAILAB 321 (H) 05/23/2022    BIOAVAILAB 70.1 05/23/2022      Lab Results   Component Value Date    TESTOSTEROTT 497 05/23/2022    TESTOSTEROTT 458 05/23/2022    TESTFRE 9.7 05/23/2022    SEXMONB 15.0 (L) 05/23/2022         Results for orders placed or performed in visit on 05/31/23   POC Urinalysis Dipstick    Specimen: Urine   Result Value Ref Range    Color Yellow Yellow, Straw, Dark Yellow, Bessie    Clarity, UA Clear Clear    Glucose,  mg/dL (A) Negative mg/dL    Bilirubin Negative Negative    Ketones, UA Trace (A) Negative    Specific Gravity  1.030 1.005 - 1.030    Blood, UA Negative Negative    pH, Urine 6.0 5.0 - 8.0    Protein, POC Trace (A) Negative mg/dL    Urobilinogen, UA 0.2 E.U./dL Normal, 0.2 E.U./dL    Leukocytes Negative Negative    Nitrite, UA Negative Negative       US Scrotum & Testicles (03/09/2023 15:01)       Assessment and Plan    Diagnoses and all orders for this visit:    1. Hypogonadism in male (Primary)  -     POC Urinalysis Dipstick  -     Comprehensive Metabolic Panel; Future  -     CBC & Differential; Future  -     Luteinizing Hormone; Future  -     Follicle Stimulating Hormone; Future  -     TestT+TestF+SHBG; Future  -     Prolactin; Future  -     Estrogens, Total; Future  -     Testosterone, Bioavailable (M); Future  -      Lipid Panel; Future  -     Urine Drug Screen - Urine, Clean Catch; Future    2. History of type 2 diabetes mellitus  -     Hemoglobin A1c; Future    3. Medication monitoring encounter  -     Lipid Panel; Future  -     Hemoglobin A1c; Future  -     Urine Drug Screen - Urine, Clean Catch; Future    4. Benign prostatic hyperplasia without lower urinary tract symptoms    5. Presence of coronary angioplasty implant and graft  -     Lipid Panel; Future    6. Scrotal cyst/abcess resolved       Continue testosterone injections 200mg im q 14days. 2 vials 28 day supply. Testosterone levels have been therapeutic on same dosage for over 2 years. Vikas reviewed. Patient is due for testosterone and associated labs. Lab for ha1c and lipid panel added to avoid having additional labs. Patient reports pcp appointment sometime in June. Will have all lab results forwarded to pcp office. Routine follow up 3 months     Follow Up   No follow-ups on file.  Patient was given instructions and counseling regarding his condition or for health maintenance advice. Please see specific information pulled into the AVS if appropriate.     SVETLANA Roberto

## 2023-06-12 DIAGNOSIS — E29.1 HYPOGONADISM IN MALE: ICD-10-CM

## 2023-06-12 RX ORDER — TESTOSTERONE CYPIONATE 200 MG/ML
200 INJECTION, SOLUTION INTRAMUSCULAR
Qty: 2 ML | Refills: 0 | Status: SHIPPED | OUTPATIENT
Start: 2023-06-12 | End: 2023-07-10

## 2023-07-21 DIAGNOSIS — E29.1 HYPOGONADISM IN MALE: ICD-10-CM

## 2023-07-29 ENCOUNTER — LAB (OUTPATIENT)
Dept: LAB | Facility: HOSPITAL | Age: 58
End: 2023-07-29
Payer: COMMERCIAL

## 2023-07-29 DIAGNOSIS — E29.1 HYPOGONADISM IN MALE: ICD-10-CM

## 2023-07-29 DIAGNOSIS — Z86.39 HISTORY OF TYPE 2 DIABETES MELLITUS: ICD-10-CM

## 2023-07-29 DIAGNOSIS — Z95.5 PRESENCE OF CORONARY ANGIOPLASTY IMPLANT AND GRAFT: ICD-10-CM

## 2023-07-29 DIAGNOSIS — Z51.81 MEDICATION MONITORING ENCOUNTER: ICD-10-CM

## 2023-07-29 LAB
ALBUMIN SERPL-MCNC: 4.5 G/DL (ref 3.5–5.2)
ALBUMIN/GLOB SERPL: 1.9 G/DL
ALP SERPL-CCNC: 41 U/L (ref 39–117)
ALT SERPL W P-5'-P-CCNC: 21 U/L (ref 1–41)
AMPHET+METHAMPHET UR QL: NEGATIVE
ANION GAP SERPL CALCULATED.3IONS-SCNC: 11.8 MMOL/L (ref 5–15)
AST SERPL-CCNC: 14 U/L (ref 1–40)
BARBITURATES UR QL SCN: NEGATIVE
BASOPHILS # BLD AUTO: 0.08 10*3/MM3 (ref 0–0.2)
BASOPHILS NFR BLD AUTO: 0.9 % (ref 0–1.5)
BENZODIAZ UR QL SCN: NEGATIVE
BILIRUB SERPL-MCNC: 0.5 MG/DL (ref 0–1.2)
BUN SERPL-MCNC: 18 MG/DL (ref 8–23)
BUN/CREAT SERPL: 18.2 (ref 7–25)
CALCIUM SPEC-SCNC: 9.5 MG/DL (ref 8.6–10.5)
CANNABINOIDS SERPL QL: NEGATIVE
CHLORIDE SERPL-SCNC: 103 MMOL/L (ref 98–107)
CHOLEST SERPL-MCNC: 117 MG/DL (ref 0–200)
CO2 SERPL-SCNC: 25.2 MMOL/L (ref 22–29)
COCAINE UR QL: NEGATIVE
CREAT SERPL-MCNC: 0.99 MG/DL (ref 0.76–1.27)
DEPRECATED RDW RBC AUTO: 48 FL (ref 37–54)
EGFRCR SERPLBLD CKD-EPI 2021: 87.2 ML/MIN/1.73
EOSINOPHIL # BLD AUTO: 0.24 10*3/MM3 (ref 0–0.4)
EOSINOPHIL NFR BLD AUTO: 2.6 % (ref 0.3–6.2)
ERYTHROCYTE [DISTWIDTH] IN BLOOD BY AUTOMATED COUNT: 17 % (ref 12.3–15.4)
FENTANYL UR-MCNC: NEGATIVE NG/ML
FSH SERPL-ACNC: <0.3 MIU/ML
GLOBULIN UR ELPH-MCNC: 2.4 GM/DL
GLUCOSE SERPL-MCNC: 157 MG/DL (ref 65–99)
HBA1C MFR BLD: 9.6 % (ref 4.8–5.6)
HCT VFR BLD AUTO: 42 % (ref 37.5–51)
HDLC SERPL-MCNC: 38 MG/DL (ref 40–60)
HGB BLD-MCNC: 13.1 G/DL (ref 13–17.7)
IMM GRANULOCYTES # BLD AUTO: 0.03 10*3/MM3 (ref 0–0.05)
IMM GRANULOCYTES NFR BLD AUTO: 0.3 % (ref 0–0.5)
LDLC SERPL CALC-MCNC: 49 MG/DL (ref 0–100)
LDLC/HDLC SERPL: 1.14 {RATIO}
LH SERPL-ACNC: <0.3 MIU/ML
LYMPHOCYTES # BLD AUTO: 3.12 10*3/MM3 (ref 0.7–3.1)
LYMPHOCYTES NFR BLD AUTO: 33.8 % (ref 19.6–45.3)
MCH RBC QN AUTO: 24.9 PG (ref 26.6–33)
MCHC RBC AUTO-ENTMCNC: 31.2 G/DL (ref 31.5–35.7)
MCV RBC AUTO: 79.7 FL (ref 79–97)
METHADONE UR QL SCN: NEGATIVE
MONOCYTES # BLD AUTO: 0.78 10*3/MM3 (ref 0.1–0.9)
MONOCYTES NFR BLD AUTO: 8.5 % (ref 5–12)
NEUTROPHILS NFR BLD AUTO: 4.97 10*3/MM3 (ref 1.7–7)
NEUTROPHILS NFR BLD AUTO: 53.9 % (ref 42.7–76)
NRBC BLD AUTO-RTO: 0 /100 WBC (ref 0–0.2)
OPIATES UR QL: NEGATIVE
OXYCODONE UR QL SCN: POSITIVE
PLATELET # BLD AUTO: 245 10*3/MM3 (ref 140–450)
PMV BLD AUTO: 11.5 FL (ref 6–12)
POTASSIUM SERPL-SCNC: 4 MMOL/L (ref 3.5–5.2)
PROLACTIN SERPL-MCNC: 8.41 NG/ML (ref 4.04–15.2)
PROT SERPL-MCNC: 6.9 G/DL (ref 6–8.5)
RBC # BLD AUTO: 5.27 10*6/MM3 (ref 4.14–5.8)
SODIUM SERPL-SCNC: 140 MMOL/L (ref 136–145)
TRIGL SERPL-MCNC: 179 MG/DL (ref 0–150)
VLDLC SERPL-MCNC: 30 MG/DL (ref 5–40)
WBC NRBC COR # BLD: 9.22 10*3/MM3 (ref 3.4–10.8)

## 2023-07-29 PROCEDURE — 84410 TESTOSTERONE BIOAVAILABLE: CPT

## 2023-07-29 PROCEDURE — 80307 DRUG TEST PRSMV CHEM ANLYZR: CPT

## 2023-07-29 PROCEDURE — 84146 ASSAY OF PROLACTIN: CPT

## 2023-07-29 PROCEDURE — 83002 ASSAY OF GONADOTROPIN (LH): CPT

## 2023-07-29 PROCEDURE — 80053 COMPREHEN METABOLIC PANEL: CPT

## 2023-07-29 PROCEDURE — 84402 ASSAY OF FREE TESTOSTERONE: CPT

## 2023-07-29 PROCEDURE — 36415 COLL VENOUS BLD VENIPUNCTURE: CPT

## 2023-07-29 PROCEDURE — 83036 HEMOGLOBIN GLYCOSYLATED A1C: CPT

## 2023-07-29 PROCEDURE — 83001 ASSAY OF GONADOTROPIN (FSH): CPT

## 2023-07-29 PROCEDURE — 84403 ASSAY OF TOTAL TESTOSTERONE: CPT

## 2023-07-29 PROCEDURE — 80061 LIPID PANEL: CPT

## 2023-07-29 PROCEDURE — 84270 ASSAY OF SEX HORMONE GLOBUL: CPT

## 2023-07-29 PROCEDURE — 85025 COMPLETE CBC W/AUTO DIFF WBC: CPT

## 2023-07-29 PROCEDURE — 82672 ASSAY OF ESTROGEN: CPT

## 2023-08-02 ENCOUNTER — CLINICAL SUPPORT (OUTPATIENT)
Dept: FAMILY MEDICINE CLINIC | Facility: CLINIC | Age: 58
End: 2023-08-02

## 2023-08-02 DIAGNOSIS — Z77.098 CHEMICAL EXPOSURE: Primary | ICD-10-CM

## 2023-08-05 LAB
TESTOST SERPL-MCNC: 534 NG/DL
TESTOSTERONE.FREE+WB MFR SERPL: 75.2 %
TESTOSTERONE.FREE+WB SERPL-MCNC: 402 NG/DL

## 2023-08-06 LAB
ESTROGEN SERPL-MCNC: 159 PG/ML (ref 56–213)
SHBG SERPL-SCNC: 16.1 NMOL/L (ref 19.3–76.4)
TESTOST FREE SERPL-MCNC: 11.6 PG/ML (ref 6.6–18.1)
TESTOST SERPL-MCNC: 651 NG/DL (ref 264–916)

## 2023-08-14 DIAGNOSIS — E29.1 HYPOGONADISM IN MALE: ICD-10-CM

## 2023-08-16 RX ORDER — TESTOSTERONE CYPIONATE 200 MG/ML
INJECTION, SOLUTION INTRAMUSCULAR
Qty: 2 ML | Refills: 0 | Status: SHIPPED | OUTPATIENT
Start: 2023-08-16

## 2023-09-09 ENCOUNTER — TRANSCRIBE ORDERS (OUTPATIENT)
Dept: ADMINISTRATIVE | Facility: HOSPITAL | Age: 58
End: 2023-09-09
Payer: COMMERCIAL

## 2023-09-09 ENCOUNTER — HOSPITAL ENCOUNTER (OUTPATIENT)
Dept: GENERAL RADIOLOGY | Facility: HOSPITAL | Age: 58
Discharge: HOME | End: 2023-09-09
Payer: COMMERCIAL

## 2023-09-09 DIAGNOSIS — M25.512 LEFT SHOULDER PAIN, UNSPECIFIED CHRONICITY: ICD-10-CM

## 2023-09-09 DIAGNOSIS — M54.50 LOW BACK PAIN, UNSPECIFIED BACK PAIN LATERALITY, UNSPECIFIED CHRONICITY, UNSPECIFIED WHETHER SCIATICA PRESENT: Primary | ICD-10-CM

## 2023-09-09 DIAGNOSIS — M54.50 LOW BACK PAIN, UNSPECIFIED BACK PAIN LATERALITY, UNSPECIFIED CHRONICITY, UNSPECIFIED WHETHER SCIATICA PRESENT: ICD-10-CM

## 2023-09-09 PROCEDURE — 72100 X-RAY EXAM L-S SPINE 2/3 VWS: CPT

## 2023-09-09 PROCEDURE — 73030 X-RAY EXAM OF SHOULDER: CPT

## 2023-09-11 ENCOUNTER — TRANSCRIBE ORDERS (OUTPATIENT)
Dept: ADMINISTRATIVE | Facility: HOSPITAL | Age: 58
End: 2023-09-11
Payer: COMMERCIAL

## 2023-09-11 DIAGNOSIS — M54.12 CERVICAL RADICULOPATHY: Primary | ICD-10-CM

## 2023-09-15 ENCOUNTER — OFFICE VISIT (OUTPATIENT)
Dept: UROLOGY | Facility: CLINIC | Age: 58
End: 2023-09-15
Payer: COMMERCIAL

## 2023-09-15 VITALS
BODY MASS INDEX: 38.66 KG/M2 | HEIGHT: 69 IN | SYSTOLIC BLOOD PRESSURE: 108 MMHG | DIASTOLIC BLOOD PRESSURE: 59 MMHG | HEART RATE: 71 BPM | WEIGHT: 261 LBS

## 2023-09-15 DIAGNOSIS — N40.0 BENIGN PROSTATIC HYPERPLASIA, UNSPECIFIED WHETHER LOWER URINARY TRACT SYMPTOMS PRESENT: ICD-10-CM

## 2023-09-15 DIAGNOSIS — Z51.81 MEDICATION MONITORING ENCOUNTER: ICD-10-CM

## 2023-09-15 DIAGNOSIS — E29.1 HYPOGONADISM IN MALE: Primary | ICD-10-CM

## 2023-09-15 DIAGNOSIS — Z95.5 PRESENCE OF CORONARY ANGIOPLASTY IMPLANT AND GRAFT: ICD-10-CM

## 2023-09-15 DIAGNOSIS — Z86.39 HISTORY OF TYPE 2 DIABETES MELLITUS: ICD-10-CM

## 2023-09-15 PROBLEM — R39.11 BENIGN PROSTATIC HYPERPLASIA WITH URINARY HESITANCY: Status: ACTIVE | Noted: 2022-04-13

## 2023-09-15 PROBLEM — R32 URINARY INCONTINENCE: Status: RESOLVED | Noted: 2023-09-15 | Resolved: 2023-09-15

## 2023-09-15 PROBLEM — N40.1 BENIGN PROSTATIC HYPERPLASIA WITH URINARY HESITANCY: Status: ACTIVE | Noted: 2022-04-13

## 2023-09-15 LAB
BILIRUB BLD-MCNC: NEGATIVE MG/DL
CLARITY, POC: CLEAR
COLOR UR: YELLOW
EXPIRATION DATE: ABNORMAL
GLUCOSE UR STRIP-MCNC: ABNORMAL MG/DL
KETONES UR QL: ABNORMAL
LEUKOCYTE EST, POC: NEGATIVE
Lab: ABNORMAL
NITRITE UR-MCNC: NEGATIVE MG/ML
PH UR: 6 [PH] (ref 5–8)
PROT UR STRIP-MCNC: ABNORMAL MG/DL
RBC # UR STRIP: NEGATIVE /UL
SP GR UR: 1.03 (ref 1–1.03)
UROBILINOGEN UR QL: ABNORMAL

## 2023-09-15 RX ORDER — TESTOSTERONE CYPIONATE 200 MG/ML
INJECTION, SOLUTION INTRAMUSCULAR
Qty: 2 ML | Refills: 0 | Status: SHIPPED | OUTPATIENT
Start: 2023-09-15

## 2023-09-15 RX ORDER — METAXALONE 800 MG/1
TABLET ORAL
COMMUNITY
End: 2023-09-15

## 2023-09-15 RX ORDER — OMEPRAZOLE 40 MG/1
CAPSULE, DELAYED RELEASE ORAL
COMMUNITY
Start: 2023-03-21 | End: 2023-09-15

## 2023-09-15 RX ORDER — NEBIVOLOL 10 MG/1
TABLET ORAL
COMMUNITY
End: 2023-09-15

## 2023-09-15 RX ORDER — HYDROCODONE BITARTRATE AND ACETAMINOPHEN 10; 325 MG/1; MG/1
TABLET ORAL
COMMUNITY
Start: 2023-09-05 | End: 2023-09-15

## 2023-09-15 RX ORDER — GLYBURIDE 5 MG/1
TABLET ORAL
COMMUNITY
End: 2023-09-15

## 2023-09-15 RX ORDER — ROSUVASTATIN CALCIUM 40 MG/1
1 TABLET, COATED ORAL DAILY
COMMUNITY
Start: 2023-07-31

## 2023-09-15 RX ORDER — GLYBURIDE 2.5 MG/1
TABLET ORAL
COMMUNITY
End: 2023-09-15

## 2023-09-15 RX ORDER — MECLIZINE HYDROCHLORIDE 25 MG/1
TABLET ORAL
COMMUNITY
End: 2023-09-15

## 2023-09-15 RX ORDER — NEBIVOLOL 5 MG/1
TABLET ORAL
COMMUNITY
End: 2023-09-15

## 2023-09-15 RX ORDER — LEVOTHYROXINE SODIUM 0.2 MG/1
1 TABLET ORAL DAILY
COMMUNITY
Start: 2023-07-31

## 2023-09-15 RX ORDER — LISINOPRIL 5 MG/1
1 TABLET ORAL DAILY
COMMUNITY
Start: 2023-08-30

## 2023-09-15 RX ORDER — DOXYCYCLINE HYCLATE 100 MG/1
CAPSULE ORAL
COMMUNITY
End: 2023-09-15

## 2023-09-15 RX ORDER — BISOPROLOL FUMARATE 5 MG/1
1 TABLET, FILM COATED ORAL DAILY
COMMUNITY
Start: 2023-07-31

## 2023-09-15 RX ORDER — NEEDLES, DISPOSABLE 18GX1 1/2"
NEEDLE, DISPOSABLE MISCELLANEOUS
Qty: 6 EACH | Refills: 3 | Status: SHIPPED | OUTPATIENT
Start: 2023-09-15

## 2023-09-15 RX ORDER — AMOXICILLIN 875 MG/1
TABLET, COATED ORAL
COMMUNITY
End: 2023-09-15

## 2023-09-15 RX ORDER — DICLOFENAC SODIUM 75 MG/1
TABLET, DELAYED RELEASE ORAL
COMMUNITY
End: 2023-09-15

## 2023-09-15 NOTE — PROGRESS NOTES
"Chief Complaint  Follow-up Hypogonadism and bph    Subjective          Jeronimo Phillips is a 58 y.o. male who presents to [facility] UROLOGY  History of Present Illness  Routine follow up of hypogonadism. Patient on testosterone injections 200mg I'm q 14 days and helps maintain energy and mood most of the time. Sleepy today due to lack of sleep last night. Shoulder pain disrupting sleep. Testosterone levels remain therapeutic range and HCT wnl. Chronic constipation due to pain medication. Reports when not able to have bowel movement for a couple of days, notices significant pressure on bladder along with urinary urgency. Denies dysuria or hematuria.     Objective   Vital Signs:   /59 (BP Location: Left arm, Patient Position: Sitting, Cuff Size: Large Adult)   Pulse 71   Ht 175.3 cm (69.02\")   Wt 118 kg (261 lb)   BMI 38.53 kg/m²     Allergies   Allergen Reactions    Clopidogrel Hives and Other (See Comments)    Clopidogrel Bisulfate Unknown - High Severity    Sulfamethoxazole-Trimethoprim Hives and Other (See Comments)    Batroxobin Unknown - High Severity      Past medical history:  has a past medical history of Acid reflux, Arthritis, Back pain, BPH (benign prostatic hyperplasia), Cervical spinal stenosis (07/09/2015), Cervical strain, Chest pain, Condition not found, Coronary artery disease, Degeneration of lumbar intervertebral disc (03/12/2015), Degenerative cervical disc (11/17/2014), Diabetes (06/03/2021), ED (erectile dysfunction), Elevated cholesterol, Encounter for medication monitoring (06/03/2021), Erectile dysfunction (2010), Headache, Heart attack, Heart disease, History of arthritis, HLD (hyperlipidemia), HTN (hypertension), Hyperlipemia, Hypogonadism male, Hypothyroid, Limb swelling, MI (myocardial infarction), Neck pain, Other cervical disc degeneration, unspecified cervical region (07/02/2015), Seasonal allergies, Sleep apnea, SOB (shortness of breath), Thyroid disease, " Thyroid disorder, and Urinary incontinence (2022).   Past surgical history:  has a past surgical history that includes Bowel resection; Coronary angioplasty with stent (2010); Cervical laminectomy (08/24/2015); Cholecystectomy; Colonoscopy (2012); Cystoscopy; Gallbladder surgery; Cardiac surgery; Inguinal hernia repair (Left); Knee surgery (Right, 01/15/2020); Lumbar discectomy (Right, 10/14/2020); Neck surgery; Stomach surgery; Vasectomy; and Band hemorrhoidectomy.  Personal history: family history includes Diabetes in his father; Heart attack in his mother; Heart disease in his father and mother; Hypertension in his father.  Social history:  reports that he has been smoking cigarettes. He has a 35.00 pack-year smoking history. He has been exposed to tobacco smoke. He has quit using smokeless tobacco. He reports that he does not currently use alcohol. He reports that he does not use drugs.    Review of Systems   Constitutional:  Negative for chills and fever.   Gastrointestinal:  Positive for constipation.   Genitourinary:  Negative for dysuria, frequency and hematuria.   Psychiatric/Behavioral:  Positive for sleep disturbance.       Physical Exam  Vitals and nursing note reviewed.   Constitutional:       General: He is awake. He is not in acute distress.     Appearance: Normal appearance. He is well-developed. He is not ill-appearing.      Comments: Ambulates without difficulty   Cardiovascular:      Rate and Rhythm: Normal rate.      Heart sounds: Murmur heard.   Pulmonary:      Effort: Pulmonary effort is normal.      Breath sounds: Normal air entry.   Abdominal:      Tenderness: There is no abdominal tenderness.   Musculoskeletal:         General: Normal range of motion.      Right lower leg: Edema present.      Left lower leg: Edema present.   Skin:     General: Skin is warm and dry.   Neurological:      Mental Status: He is oriented to person, place, and time.      Motor: Motor function is intact.    Psychiatric:         Mood and Affect: Mood normal.         Behavior: Behavior normal. Behavior is cooperative.         Thought Content: Thought content normal.         Judgment: Judgment normal.      Result Review :     CMP          7/29/2023    10:17   CMP   Glucose 157    BUN 18    Creatinine 0.99    EGFR 87.2    Sodium 140    Potassium 4.0    Chloride 103    Calcium 9.5    Total Protein 6.9    Albumin 4.5    Globulin 2.4    Total Bilirubin 0.5    Alkaline Phosphatase 41    AST (SGOT) 14    ALT (SGPT) 21    Albumin/Globulin Ratio 1.9    BUN/Creatinine Ratio 18.2    Anion Gap 11.8      CBC          12/29/2022    12:45 7/29/2023    10:17   CBC   WBC 8.16     9.22    RBC 5.22     5.27    Hemoglobin 13.7     13.1    Hematocrit 44.0     42.0    MCV 84.3     79.7    MCH 26.2     24.9    MCHC 31.1     31.2    RDW 17.0     17.0    Platelets 187     245       Details          This result is from an external source.             Lipid Panel          7/29/2023    10:17   Lipid Panel   Total Cholesterol 117    Triglycerides 179    HDL Cholesterol 38    VLDL Cholesterol 30    LDL Cholesterol  49    LDL/HDL Ratio 1.14      Most Recent A1C          7/29/2023    10:17   HGBA1C Most Recent   Hemoglobin A1C 9.60      PSA   Date Value Ref Range Status   12/29/2022 1.39 0.0 - 4.0 ng/mL Final     Comment:     (note)  Testing performed on the Claudia Pro. Should not be  interpreted as evidence for the presence  or absence of malignancy.   01/05/2022 1.22 0.00 - 4.00 ng/mL Final     Comment:     (note)  Testing performed on the Claudia Pro. Should not be  interpreted as evidence for the presence  or absence of malignancy.   03/06/2021 1.10 0.00 - 4.00 ng/mL Final     No results found for: ESTRADIOL  Lab Results   Component Value Date    ESTROGEN 159 07/29/2023      Lab Results   Component Value Date    LH <0.30 07/29/2023      Lab Results   Component Value Date    FSH <0.30 07/29/2023      Lab Results   Component Value Date    PROLACTIN  8.41 07/29/2023      Lab Results   Component Value Date    TESTOSTEROTT 651 07/29/2023    TESTOSTEROTT 534 07/29/2023    BIOAVAILAB 402 (H) 07/29/2023    BIOAVAILAB 75.2 07/29/2023      Lab Results   Component Value Date    TESTOSTEROTT 651 07/29/2023    TESTOSTEROTT 534 07/29/2023    TESTFRE 11.6 07/29/2023    SEXMONB 16.1 (L) 07/29/2023         Results for orders placed or performed in visit on 09/15/23   POC Urinalysis Dipstick, Automated    Specimen: Urine   Result Value Ref Range    Color Yellow Yellow, Straw, Dark Yellow, Bessie    Clarity, UA Clear Clear    Specific Gravity  1.030 1.005 - 1.030    pH, Urine 6.0 5.0 - 8.0    Leukocytes Negative Negative    Nitrite, UA Negative Negative    Protein, POC 30 mg/dL (A) Negative mg/dL    Glucose,  mg/dL (A) Negative mg/dL    Ketones, UA Trace (A) Negative    Urobilinogen, UA 0.2 E.U./dL Normal, 0.2 E.U./dL    Bilirubin Negative Negative    Blood, UA Negative Negative    Lot Number [id number]     Expiration Date 8-             Assessment and Plan    Diagnoses and all orders for this visit:    1. Hypogonadism in male (Primary)  -     POC Urinalysis Dipstick, Automated  -     Testosterone Cypionate (DEPOTESTOTERONE CYPIONATE) 200 MG/ML injection; Administer 200 mg IM q 14 days as instructed every 14 days  Dispense: 2 mL; Refill: 0    2. History of type 2 diabetes mellitus poorly controlled    3. Medication monitoring encounter    4. Benign prostatic hyperplasia, unspecified whether lower urinary tract symptoms present    5. Presence of coronary angioplasty implant and graft      Labs reviewed and discussed. Continue testosterone injections 200mg I'm q 14 days 2 vials for 28 day supply.  Minor voiding complaints  intermittent and does not want to anymore medications. Tamsulosin in the past and restart declined.Request patient to bring in medication list next follow up visit in 3 months.    Discussed diet and poorly controlled diabetes. Relayed adverse affects  of diabetes on vasculature of body and kidneys. Admits to poor diet choices and lack of water intake.  Advised to discuss diabetes medications and pedal edema with pcp at upcoming appointment.           Follow Up   Return in about 3 months (around 12/15/2023) for hypogonadism and medication monitoring.  Patient was given instructions and counseling regarding his condition or for health maintenance advice. Please see specific information pulled into the AVS if appropriate.     Erika Diehl APRN

## 2023-10-10 ENCOUNTER — LAB (OUTPATIENT)
Dept: LAB | Facility: HOSPITAL | Age: 58
End: 2023-10-10
Payer: COMMERCIAL

## 2023-10-10 ENCOUNTER — OFFICE VISIT (OUTPATIENT)
Dept: GASTROENTEROLOGY | Facility: CLINIC | Age: 58
End: 2023-10-10
Payer: COMMERCIAL

## 2023-10-10 VITALS
BODY MASS INDEX: 38.54 KG/M2 | DIASTOLIC BLOOD PRESSURE: 72 MMHG | HEIGHT: 69 IN | HEART RATE: 81 BPM | SYSTOLIC BLOOD PRESSURE: 134 MMHG | WEIGHT: 260.2 LBS

## 2023-10-10 DIAGNOSIS — K92.1 BLOOD IN STOOL: ICD-10-CM

## 2023-10-10 DIAGNOSIS — K59.03 DRUG-INDUCED CONSTIPATION: Primary | ICD-10-CM

## 2023-10-10 DIAGNOSIS — D64.9 ANEMIA, UNSPECIFIED TYPE: ICD-10-CM

## 2023-10-10 DIAGNOSIS — R12 HEARTBURN: ICD-10-CM

## 2023-10-10 LAB
DEPRECATED RDW RBC AUTO: 44.9 FL (ref 37–54)
ERYTHROCYTE [DISTWIDTH] IN BLOOD BY AUTOMATED COUNT: 15.8 % (ref 12.3–15.4)
HCT VFR BLD AUTO: 41.3 % (ref 37.5–51)
HGB BLD-MCNC: 12.6 G/DL (ref 13–17.7)
MCH RBC QN AUTO: 24.3 PG (ref 26.6–33)
MCHC RBC AUTO-ENTMCNC: 30.5 G/DL (ref 31.5–35.7)
MCV RBC AUTO: 79.7 FL (ref 79–97)
PLATELET # BLD AUTO: 225 10*3/MM3 (ref 140–450)
PMV BLD AUTO: 11 FL (ref 6–12)
RBC # BLD AUTO: 5.18 10*6/MM3 (ref 4.14–5.8)
WBC NRBC COR # BLD: 9.18 10*3/MM3 (ref 3.4–10.8)

## 2023-10-10 PROCEDURE — 99214 OFFICE O/P EST MOD 30 MIN: CPT | Performed by: NURSE PRACTITIONER

## 2023-10-10 PROCEDURE — 85027 COMPLETE CBC AUTOMATED: CPT

## 2023-10-10 PROCEDURE — 36415 COLL VENOUS BLD VENIPUNCTURE: CPT

## 2023-10-10 RX ORDER — HYDROCODONE BITARTRATE AND ACETAMINOPHEN 10; 325 MG/1; MG/1
TABLET ORAL
COMMUNITY
Start: 2023-09-19

## 2023-10-10 RX ORDER — LUBIPROSTONE 24 UG/1
24 CAPSULE ORAL 2 TIMES DAILY WITH MEALS
Qty: 60 CAPSULE | Refills: 3 | Status: SHIPPED | OUTPATIENT
Start: 2023-10-10

## 2023-10-10 RX ORDER — HYDROCORTISONE 25 MG/G
CREAM TOPICAL 2 TIMES DAILY
Qty: 28 EACH | Refills: 1 | Status: SHIPPED | OUTPATIENT
Start: 2023-10-10 | End: 2023-10-24

## 2023-10-10 RX ORDER — DOCUSATE SODIUM 100 MG/1
100 CAPSULE, LIQUID FILLED ORAL 2 TIMES DAILY
Qty: 60 CAPSULE | Refills: 3 | Status: SHIPPED | OUTPATIENT
Start: 2023-10-10

## 2023-10-10 NOTE — PATIENT INSTRUCTIONS
warm sitz bath x 15 min 2-3xday, use medicated hemorrhoidal wipes with witch-mary, avoid straining with bowel movements, avoid prolonged standing or sitting. Avoid lifting if possible.

## 2023-10-10 NOTE — PROGRESS NOTES
"Patient Name: Jeronimo Phillips   Visit Date: 10/10/2023   Patient ID: [id number]  Provider: SVETLANA Steve    Sex: male  Location:  Location Address:  Location Phone: [phone]  [place] [address] [phone]    YOB: 1965  Age: 58 y.o.   Primary Care Provider Danny Mccartney MD      Referring Provider: No ref. provider found        Chief Complaint  Abdominal Pain (Upper ABD pain daily ), Constipation (Pt states havin 1 BM weekly with straining. Having hard small stool ), Hemorrhoids, Black or Bloody Stool (Every BM ), and Nausea (occ)    History of Present Illness    New pt presents with blood in stool for a \"long time\" pt states he has painful hemorrhoids, he thought blood r/t hemorrhoids. States he may go 1 week w/o BM, takes OTC laxative  - dulcolax to have BM. + straining , states has failed Linzess 72 mcg/d. Has tried stool softeners. Taking Norco 6/day. Thinks he may have tried Movantik and did not work.   No abd pain w meals. Pt has frequent abd pain - thinks r/t bloating and constipation  Pt is taking Nexium 40 mg/d and this has worked well for HB - cannot miss nexium.   Noted anemia on labs 9/20/23 - hgb 11.8, microcytic, CMP normal    Last colonoscopy May 2022, states hemorrhoids were banded and no polyps, but reports hx polyps.   Past Medical History:   Diagnosis Date    Acid reflux     Arthritis     Back pain     BPH (benign prostatic hyperplasia)     Cervical spinal stenosis 07/09/2015    C3-4 C4-5 WITH MYELOMALACIA C4-5 AND C5-6    Cervical strain     Chest pain     Condition not found     MEATAL STENOSIS    Coronary artery disease     Degeneration of lumbar intervertebral disc 03/12/2015    Degenerative cervical disc 11/17/2014    Diabetes 06/03/2021    ED (erectile dysfunction)     Elevated cholesterol     Encounter for medication monitoring 06/03/2021    Erectile dysfunction 2010    Headache     Heart attack     Heart disease     History of arthritis     HLD " "(hyperlipidemia)     HTN (hypertension)     Hyperlipemia     Hypogonadism male     Hypothyroid     Limb swelling     MI (myocardial infarction)     Neck pain     Other cervical disc degeneration, unspecified cervical region 07/02/2015    Seasonal allergies     Sleep apnea     SOB (shortness of breath)     Thyroid disease     Thyroid disorder     Urinary incontinence 2022       Past Surgical History:   Procedure Laterality Date    CARDIAC SURGERY      CERVICAL LAMINECTOMY  08/24/2015    C3-4 C4-5 C5-6    CHOLECYSTECTOMY      COLON RESECTION      COLONOSCOPY  2012    CORONARY ANGIOPLASTY WITH STENT PLACEMENT  2010    CYSTOSCOPY      GALLBLADDER SURGERY      HEMORRHOID BANDING      INGUINAL HERNIA REPAIR Left     KNEE SURGERY Right 01/15/2020    REPAIR    LUMBAR DISCECTOMY Right 10/14/2020    L3-4 DISECTOMY WITH LAMINECTOMY MINIMAL INVASIVE    NECK SURGERY      STOMACH SURGERY      VASECTOMY         Allergies   Allergen Reactions    Clopidogrel Hives and Other (See Comments)    Clopidogrel Bisulfate Unknown - High Severity    Sulfamethoxazole-Trimethoprim Hives and Other (See Comments)    Batroxobin Unknown - High Severity       Family History   Problem Relation Age of Onset    Heart disease Mother     Heart attack Mother     Heart disease Father     Diabetes Father     Hypertension Father     Colon cancer Neg Hx         Social History     Tobacco Use    Smoking status: Every Day     Packs/day: 1.00     Years: 35.00     Additional pack years: 0.00     Total pack years: 35.00     Types: Cigarettes     Passive exposure: Current    Smokeless tobacco: Former    Tobacco comments:     SMOKING 21-30 YEARS   Vaping Use    Vaping Use: Never used   Substance Use Topics    Alcohol use: Not Currently     Comment: OCCASIONALLY    Drug use: Never       Objective     Vital Signs:   /72 (BP Location: Left arm, Patient Position: Sitting, Cuff Size: Adult)   Pulse 81   Ht 175.3 cm (69.02\")   Wt 118 kg (260 lb 3.2 oz)   BMI " 38.40 kg/mý       Physical Exam  Constitutional:       General: The patient is not in acute distress.     Appearance: Normal appearance.   HENT:      Head: Normocephalic and atraumatic.      Nose: Nose normal.   Pulmonary:      Effort: Pulmonary effort is normal. No respiratory distress.   Abdominal:      General: Abdomen is flat.      Palpations: Abdomen is soft. There is no mass.      Tenderness: There is no abdominal tenderness. There is no guarding.   Musculoskeletal:      Cervical back: Neck supple.      Right lower leg: No edema.      Left lower leg: No edema.   Skin:     General: Skin is warm and dry.   Neurological:      General: No focal deficit present.      Mental Status: The patient is alert and oriented to person, place, and time.      Gait: Gait normal.   Psychiatric:         Mood and Affect: Mood normal.         Speech: Speech normal.         Behavior: Behavior normal.         Thought Content: Thought content normal.     Result Review :   The following data was reviewed by: SVETLANA Steve on 10/10/2023:                    Assessment and Plan    Diagnoses and all orders for this visit:    1. Drug-induced constipation (Primary)    2. Blood in stool  -     CBC (No Diff); Future  -     Case Request; Standing  -     Case Request    3. Heartburn  -     Case Request; Standing  -     Case Request    4. Anemia, unspecified type  -     CBC (No Diff); Future  -     Case Request; Standing  -     Case Request    Other orders  -     polyethylene glycol (GoLYTELY) 236 g solution; Take per office instructions  Dispense: 4000 mL; Refill: 0  -     lubiprostone (Amitiza) 24 MCG capsule; Take 1 capsule by mouth 2 (Two) Times a Day With Meals.  Dispense: 60 capsule; Refill: 3  -     docusate sodium (Colace) 100 MG capsule; Take 1 capsule by mouth 2 (Two) Times a Day.  Dispense: 60 capsule; Refill: 3  -     Implement Anesthesia orders day of procedure.; Standing  -     Obtain informed consent; Standing  -      Follow Anesthesia Guidelines / Protocol; Future  -     Obtain Informed Consent; Future  -     Verify NPO; Standing  -     Verify bowel prep was successful; Standing  -     Give tap water enema if bowel prep was insufficient; Standing  -     Hydrocortisone, Perianal, (ANUSOL-HC) 2.5 % rectal cream; Insert  into the rectum 2 (Two) Times a Day for 14 days.  Dispense: 28 each; Refill: 1              Follow Up   Return for follow up after procedure.  -2 d cl liquids prep  -EGD/COLONOSCOPY Surgical Risk and Benefits: Possible risks/complications, benefits, and alternatives to surgical or invasive procedure have been explained to patient and/or legal guardian; risks include bleeding, infection, and perforation. Patient has been evaluated and can tolerate anesthesia and/or sedation. Risks, benefits, and alternatives to anesthesia and sedation have been explained to patient and/or legal guardian.   -Repeat CBC   -Start Amitiza 24 mcg BID  -Colace 100 mg po BID   -Anusol HC 2.5% cream BID   -Discussed with pt measures for hemorrhoids such as warm sitz bath x 15 min 2-3xday, use medicated hemorrhoidal wipes with witch-mary, avoid straining with bowel movements, avoid prolonged standing or sitting. Avoid lifting if possible.       Patient was given instructions and counseling regarding his condition or for health maintenance advice. Please see specific information pulled into the AVS if appropriate.

## 2023-10-12 ENCOUNTER — PATIENT ROUNDING (BHMG ONLY) (OUTPATIENT)
Dept: GASTROENTEROLOGY | Facility: CLINIC | Age: 58
End: 2023-10-12
Payer: COMMERCIAL

## 2023-10-12 NOTE — PROGRESS NOTES
10/12/2023      Hello, may I speak with Jeronimo Phillips     My name is Nahid. I am calling from [facility]. I show that you had a recent visit with SVETLANA Eldridge.    Before we get started may I verify your date of birth? 1965    I am calling to officially welcome you to our practice and ask about your recent visit. Is this a good time to talk? No I left patient a voicemail.     Tell me about your visit with us. What things went well?    We strive to ensure that we protect your safety and privacy. Is there anything we could have done to improve this during your visit?        We're always looking for ways to make our patients' experiences even better. Do you have recommendations on ways we may improve?    Overall were you satisfied with your first visit to our practice?    I appreciate you taking the time to speak with me today. Is there anything else I can do for you?    I am glad to hear that you had a very good visit and I appreciate you taking the time to provide feedback on this call. We would greatly appreciate you filling out a survey if you receive one in the mail, email or text. This is a great opportunity to provide any additional feedback that you may think of after this call as well.       Thank you, and have a great day.

## 2023-10-16 ENCOUNTER — TELEPHONE (OUTPATIENT)
Dept: GASTROENTEROLOGY | Facility: CLINIC | Age: 58
End: 2023-10-16
Payer: COMMERCIAL

## 2023-10-16 NOTE — TELEPHONE ENCOUNTER
Patient returned call.  Informed of change in schedule for EGD/Colonoscopy.  Patient states that will work for his schedule. Verified he does have bowel prep already on hand.  Patient has no questions regarding prep, other than insurance approval.  Recommended patient call his insurance company and also offered [facility] Billing contact information.

## 2023-10-16 NOTE — TELEPHONE ENCOUNTER
Attempted to contact patient, left voicemail message to return call.     Patient's EGD/Colonoscopy has been rescheduled to 10/26/23 with expected arrival time of 0800.

## 2023-10-20 DIAGNOSIS — E29.1 HYPOGONADISM IN MALE: ICD-10-CM

## 2023-10-23 RX ORDER — TESTOSTERONE CYPIONATE 200 MG/ML
INJECTION, SOLUTION INTRAMUSCULAR
Qty: 2 ML | Refills: 0 | Status: SHIPPED | OUTPATIENT
Start: 2023-10-23

## 2023-10-24 NOTE — PRE-PROCEDURE INSTRUCTIONS
"Instructed on date and arrival time of 0800. Come to entrance \"C\". Must have  over age 18 to drive home.  May have two visitors; however, children under 12 must stay in waiting room.  Discussed clear liquid diet (no red or purple) and bowel prep.  May take medications as usual except for blood thinners, diabetic medications, and weight loss medications.  Bring list of medications.  Verbalized understanding of instructions given.  Instructed to call for questions or concerns.  "

## 2023-10-25 ENCOUNTER — ANESTHESIA EVENT (OUTPATIENT)
Dept: GASTROENTEROLOGY | Facility: HOSPITAL | Age: 58
End: 2023-10-25
Payer: COMMERCIAL

## 2023-10-25 NOTE — ANESTHESIA PREPROCEDURE EVALUATION
Anesthesia Evaluation     Patient summary reviewed and Nursing notes reviewed   NPO Solid Status: > 8 hours  NPO Liquid Status: > 2 hours           Airway   Mallampati: II  TM distance: <3 FB  Neck ROM: full  Possible difficult intubation  Dental - normal exam     Pulmonary - normal exam   (+) a smoker Current,shortness of breath, sleep apnea  Cardiovascular - normal exam    ECG reviewed    (+) hypertension, past MI  >12 months, CAD, cardiac stents (STENTS X 3, LAST IN 2006. ANGIOPALSTY 2010) Bare metal stent more than 12 months ago , hyperlipidemia      Neuro/Psych  (+) headaches  GI/Hepatic/Renal/Endo    (+) morbid obesity, GERD, diabetes mellitus (07/2023--HBG A1C 9.6) poorly controlled, thyroid problem hypothyroidism    Musculoskeletal     (+) back pain, neck pain  Abdominal   (+) obese   Substance History      OB/GYN          Other   arthritis,     ROS/Med Hx Other: CARDS VISIT ([facility]) 01/2023  PLAN:    1. He has CAD with a stable anginal pattern. I would recommend continuation of aspirin, beta-blocker and high intensity statin therapy. I again counseled him on the need for tobacco cessation.  2. His blood pressure typically is under fairly good control with amlodipine, lisinopril and bisoprolol. I have suggested no changes to his current antihypertensive regimen given his current heart rate and blood pressure readings.  3. His last lipid profile yielded good results on high intensity statin therapy with a target LDL less than 70 mg/dL.    I will plan to see him in follow-up in 1 year     STRESS TEST 2016  MPRESSION:   1. Probably normal Lexiscan Cardiolite stress test.   2. No evidence of pharmacologic induced ischemia.   3. No definite infarction zones (the inferior hypoperfusion is likely related to diaphragmatic attenuation given the patient's body habitus).   4. Normal LV systolic function with ejection fraction of 54%.                       Anesthesia Plan    ASA 3     general   total IV  anesthesia  (Total IV Anesthesia    Patient understands anesthesia not responsible for dental damage.  )  intravenous induction     Anesthetic plan, risks, benefits, and alternatives have been provided, discussed and informed consent has been obtained with: patient.    Plan discussed with CRNA.      CODE STATUS:

## 2023-10-26 ENCOUNTER — ANESTHESIA (OUTPATIENT)
Dept: GASTROENTEROLOGY | Facility: HOSPITAL | Age: 58
End: 2023-10-26
Payer: COMMERCIAL

## 2023-10-26 ENCOUNTER — HOSPITAL ENCOUNTER (OUTPATIENT)
Facility: HOSPITAL | Age: 58
Setting detail: HOSPITAL OUTPATIENT SURGERY
Discharge: HOME | End: 2023-10-26
Attending: INTERNAL MEDICINE | Admitting: INTERNAL MEDICINE
Payer: COMMERCIAL

## 2023-10-26 VITALS
BODY MASS INDEX: 36.57 KG/M2 | WEIGHT: 246.91 LBS | HEIGHT: 69 IN | DIASTOLIC BLOOD PRESSURE: 59 MMHG | OXYGEN SATURATION: 96 % | RESPIRATION RATE: 13 BRPM | HEART RATE: 59 BPM | TEMPERATURE: 97.8 F | SYSTOLIC BLOOD PRESSURE: 115 MMHG

## 2023-10-26 DIAGNOSIS — D64.9 ANEMIA, UNSPECIFIED TYPE: ICD-10-CM

## 2023-10-26 DIAGNOSIS — K92.1 BLOOD IN STOOL: ICD-10-CM

## 2023-10-26 DIAGNOSIS — R12 HEARTBURN: ICD-10-CM

## 2023-10-26 LAB — GLUCOSE BLDC GLUCOMTR-MCNC: 137 MG/DL (ref 70–99)

## 2023-10-26 PROCEDURE — 25010000002 PROPOFOL 10 MG/ML EMULSION: Performed by: NURSE ANESTHETIST, CERTIFIED REGISTERED

## 2023-10-26 PROCEDURE — 45385 COLONOSCOPY W/LESION REMOVAL: CPT | Performed by: INTERNAL MEDICINE

## 2023-10-26 PROCEDURE — 88305 TISSUE EXAM BY PATHOLOGIST: CPT | Performed by: INTERNAL MEDICINE

## 2023-10-26 PROCEDURE — 25810000003 LACTATED RINGERS PER 1000 ML: Performed by: NURSE ANESTHETIST, CERTIFIED REGISTERED

## 2023-10-26 PROCEDURE — 43239 EGD BIOPSY SINGLE/MULTIPLE: CPT | Performed by: INTERNAL MEDICINE

## 2023-10-26 PROCEDURE — 82948 REAGENT STRIP/BLOOD GLUCOSE: CPT

## 2023-10-26 RX ORDER — PROPOFOL 10 MG/ML
VIAL (ML) INTRAVENOUS AS NEEDED
Status: DISCONTINUED | OUTPATIENT
Start: 2023-10-26 | End: 2023-10-26 | Stop reason: SURG

## 2023-10-26 RX ORDER — LIDOCAINE HYDROCHLORIDE 20 MG/ML
INJECTION, SOLUTION EPIDURAL; INFILTRATION; INTRACAUDAL; PERINEURAL AS NEEDED
Status: DISCONTINUED | OUTPATIENT
Start: 2023-10-26 | End: 2023-10-26 | Stop reason: SURG

## 2023-10-26 RX ORDER — SODIUM CHLORIDE, SODIUM LACTATE, POTASSIUM CHLORIDE, CALCIUM CHLORIDE 600; 310; 30; 20 MG/100ML; MG/100ML; MG/100ML; MG/100ML
30 INJECTION, SOLUTION INTRAVENOUS CONTINUOUS
Status: DISCONTINUED | OUTPATIENT
Start: 2023-10-26 | End: 2023-10-26 | Stop reason: HOSPADM

## 2023-10-26 RX ADMIN — LIDOCAINE HYDROCHLORIDE 50 MG: 20 INJECTION, SOLUTION EPIDURAL; INFILTRATION; INTRACAUDAL; PERINEURAL at 09:52

## 2023-10-26 RX ADMIN — PROPOFOL 50 MG: 10 INJECTION, EMULSION INTRAVENOUS at 09:52

## 2023-10-26 RX ADMIN — SODIUM CHLORIDE, POTASSIUM CHLORIDE, SODIUM LACTATE AND CALCIUM CHLORIDE 30 ML/HR: 600; 310; 30; 20 INJECTION, SOLUTION INTRAVENOUS at 09:21

## 2023-10-26 RX ADMIN — PROPOFOL 175 MCG/KG/MIN: 10 INJECTION, EMULSION INTRAVENOUS at 09:52

## 2023-10-26 NOTE — ANESTHESIA POSTPROCEDURE EVALUATION
Patient: Jeronimo Phillips    Procedure Summary       Date: 10/26/23 Room / Location: [facility] ENDOSCOPY 4 / [facility] ENDOSCOPY    Anesthesia Start: 0948 Anesthesia Stop: 1033    Procedures:       ESOPHAGOGASTRODUODENOSCOPY WITH BIOPSIES      COLONOSCOPY WITH POLYPECTOMIES Diagnosis:       Blood in stool      Heartburn      Anemia, unspecified type      (Blood in stool [K92.1])      (Heartburn [R12])      (Anemia, unspecified type [D64.9])    Surgeons: Matteo Leavitt MD Provider: Lamin Bowen CRNA    Anesthesia Type: general ASA Status: 3            Anesthesia Type: general    Vitals  Vitals Value Taken Time   /59 10/26/23 1102   Temp 36.6 °C (97.8 °F) 10/26/23 1102   Pulse 59 10/26/23 1102   Resp 13 10/26/23 1102   SpO2 96 % 10/26/23 1102           Anesthesia Post Evaluation

## 2023-10-26 NOTE — H&P
Pre Procedure History & Physical    Chief Complaint:   Heartburn and blood in the stool    Subjective     HPI:   Heartburn and blood in the stool    Past Medical History:   Past Medical History:   Diagnosis Date    Acid reflux     Arthritis     Back pain     BPH (benign prostatic hyperplasia)     Cervical spinal stenosis 07/09/2015    C3-4 C4-5 WITH MYELOMALACIA C4-5 AND C5-6    Cervical strain     Chest pain     Condition not found     MEATAL STENOSIS    Coronary artery disease     Degeneration of lumbar intervertebral disc 03/12/2015    Degenerative cervical disc 11/17/2014    Diabetes 06/03/2021    ED (erectile dysfunction)     Elevated cholesterol     Encounter for medication monitoring 06/03/2021    Erectile dysfunction 2010    Headache     Heart attack     Heart disease     History of arthritis     HLD (hyperlipidemia)     HTN (hypertension)     Hyperlipemia     Hypogonadism male     Hypothyroid     Limb swelling     MI (myocardial infarction)     Neck pain     Other cervical disc degeneration, unspecified cervical region 07/02/2015    Seasonal allergies     Sleep apnea     SOB (shortness of breath)     Thyroid disease     Thyroid disorder     Urinary incontinence 2022       Past Surgical History:  Past Surgical History:   Procedure Laterality Date    CARDIAC SURGERY      CERVICAL LAMINECTOMY  08/24/2015    C3-4 C4-5 C5-6    CHOLECYSTECTOMY      COLON RESECTION      COLONOSCOPY  2012    CORONARY ANGIOPLASTY WITH STENT PLACEMENT  2010    CYSTOSCOPY      GALLBLADDER SURGERY      HEMORRHOID BANDING      INGUINAL HERNIA REPAIR Left     KNEE SURGERY Right 01/15/2020    REPAIR    LUMBAR DISCECTOMY Right 10/14/2020    L3-4 DISECTOMY WITH LAMINECTOMY MINIMAL INVASIVE    NECK SURGERY      STOMACH SURGERY      VASECTOMY         Family History:  Family History   Problem Relation Age of Onset    Heart disease Mother     Heart attack Mother     Heart disease Father     Diabetes Father     Hypertension Father     Colon  "cancer Neg Hx        Social History:   reports that he has been smoking cigarettes. He has a 35.00 pack-year smoking history. He has been exposed to tobacco smoke. He has quit using smokeless tobacco. He reports that he does not currently use alcohol. He reports that he does not use drugs.    Medications:   Medications Prior to Admission   Medication Sig Dispense Refill Last Dose    amLODIPine (NORVASC) 10 MG tablet        aspirin 81 MG EC tablet aspirin 81 mg oral tablet,delayed release (DR/EC) take 1 tablet (81 mg) by oral route once daily   Active       bisoprolol (ZEBeta) 5 MG tablet Take 1 tablet by mouth Daily. (Patient not taking: Reported on 10/10/2023)       cholecalciferol (VITAMIN D3) 25 MCG (1000 UT) tablet Vitamin D3 1,000 unit oral tablet take 1 tablet by oral route daily   Active       cyclobenzaprine (FLEXERIL) 10 MG tablet        docusate sodium (Colace) 100 MG capsule Take 1 capsule by mouth 2 (Two) Times a Day. 60 capsule 3     esomeprazole (nexIUM) 40 MG capsule Take 1 capsule by mouth Daily.       gabapentin (NEURONTIN) 800 MG tablet        glipizide (GLUCOTROL) 5 MG tablet glipizide 5 mg oral tablet take 1 tablet (5 mg) by oral route once daily before a meal   Active       HYDROcodone-acetaminophen (NORCO)  MG per tablet Take 1 tablet every 4 hours by oral route as needed for 30 days.       levothyroxine (SYNTHROID, LEVOTHROID) 200 MCG tablet Take 1 tablet by mouth Daily.       lisinopril (PRINIVIL,ZESTRIL) 5 MG tablet Take 1 tablet by mouth Daily.       lubiprostone (Amitiza) 24 MCG capsule Take 1 capsule by mouth 2 (Two) Times a Day With Meals. 60 capsule 3     metFORMIN (GLUCOPHAGE) 1000 MG tablet Take 1 tablet by mouth 2 (Two) Times a Day With Meals.       nabumetone (RELAFEN) 500 MG tablet        Needle, Disp, (B-D HYPODERMIC NEEDLE 18GX1.5\") 18G X 1-1/2\" misc USE FOR TESTOSTERONE INJECTIONS EVERY 2 WEEKS 6 each 3     polyethylene glycol (GoLYTELY) 236 g solution Take per office " "instructions 4000 mL 0     rosuvastatin (CRESTOR) 40 MG tablet Take 1 tablet by mouth Daily.       sildenafil (VIAGRA) 100 MG tablet Viagra 100 mg oral tablet take 1 tablet (100 mg) by oral route once daily as needed approximately 1 hour before sexual activity   Suspended       Syringe/Needle, Disp, (B-D 3CC LUER-REYES SYR 25GX1\") 25G X 1\" 3 ML misc USE WITH TESTOSTERONE 6 each 1     Testosterone Cypionate (DEPOTESTOTERONE CYPIONATE) 200 MG/ML injection INJECT 1 MILLILITER INTRAMUSCULARLY EVERY 14 DAYS 2 mL 0        Allergies:  Clopidogrel, Clopidogrel bisulfate, Sulfamethoxazole-trimethoprim, and Batroxobin        Objective     Weight 112 kg (246 lb 14.6 oz).    Physical Exam   Constitutional: Pt is oriented to person, place, and time and well-developed, well-nourished, and in no distress.   Mouth/Throat: Oropharynx is clear and moist.   Neck: Normal range of motion.   Cardiovascular: Normal rate, regular rhythm and normal heart sounds.    Pulmonary/Chest: Effort normal and breath sounds normal.   Abdominal: Soft. Nontender  Skin: Skin is warm and dry.   Psychiatric: Mood, memory, affect and judgment normal.     Assessment & Plan     Diagnosis:  Heartburn and blood in the stool    Anticipated Surgical Procedure:  EGD and colonoscopy    The risks, benefits, and alternatives of this procedure have been discussed with the patient or the responsible party- the patient understands and agrees to proceed.            "

## 2023-10-30 LAB
CYTO UR: NORMAL
LAB AP CASE REPORT: NORMAL
LAB AP CLINICAL INFORMATION: NORMAL
PATH REPORT.FINAL DX SPEC: NORMAL
PATH REPORT.GROSS SPEC: NORMAL

## 2023-10-31 NOTE — PROGRESS NOTES
EGD 10/26/2023: Lower third of the esophagus normal-biopsy negative, normal stomach and normal duodenum    Colonoscopy 10/26/2023: Internal hemorrhoids, 2 small polyps in the ascending colon and cecum-adenomatous/benign  Repeat colonoscopy 5 years    Schedule follow-up

## 2023-11-07 ENCOUNTER — TRANSCRIBE ORDERS (OUTPATIENT)
Dept: ADMINISTRATIVE | Facility: HOSPITAL | Age: 58
End: 2023-11-07
Payer: COMMERCIAL

## 2023-11-07 DIAGNOSIS — M13.819 OTHER SPECIFIED ARTHRITIS, UNSPECIFIED SHOULDER: Primary | ICD-10-CM

## 2023-11-07 DIAGNOSIS — M54.16 LUMBAR RADICULOPATHY: ICD-10-CM

## 2023-11-24 DIAGNOSIS — E29.1 HYPOGONADISM IN MALE: ICD-10-CM

## 2023-11-27 RX ORDER — TESTOSTERONE CYPIONATE 200 MG/ML
INJECTION, SOLUTION INTRAMUSCULAR
Qty: 2 ML | Refills: 0 | Status: SHIPPED | OUTPATIENT
Start: 2023-11-27

## 2023-12-09 ENCOUNTER — HOSPITAL ENCOUNTER (OUTPATIENT)
Dept: MRI IMAGING | Facility: HOSPITAL | Age: 58
Discharge: HOME | End: 2023-12-09
Payer: COMMERCIAL

## 2023-12-09 DIAGNOSIS — M54.16 LUMBAR RADICULOPATHY: ICD-10-CM

## 2023-12-09 DIAGNOSIS — M13.819 OTHER SPECIFIED ARTHRITIS, UNSPECIFIED SHOULDER: ICD-10-CM

## 2023-12-09 PROCEDURE — 73221 MRI JOINT UPR EXTREM W/O DYE: CPT

## 2023-12-09 PROCEDURE — 72148 MRI LUMBAR SPINE W/O DYE: CPT

## 2023-12-21 DIAGNOSIS — E29.1 HYPOGONADISM IN MALE: ICD-10-CM

## 2023-12-21 NOTE — TELEPHONE ENCOUNTER
"    Caller: Jeronimo Phillips    Relationship: Self    Best call back number: [phone]    Requested Prescriptions:   Requested Prescriptions     Pending Prescriptions Disp Refills    Syringe/Needle, Disp, (B-D 3CC LUER-REYES SYR 25GX1\") 25G X 1\" 3 ML misc 6 each 1     Sig: USE WITH TESTOSTERONE      TESTOSTERONE      Pharmacy where request should be sent:  [facility]    Last office visit with prescribing clinician: 9/15/2023   Last telemedicine visit with prescribing clinician: Visit date not found   Next office visit with prescribing clinician: 12/27/2023     Additional details provided by patient: PT HAS RESCHEDULED FU APPT FOR 12/27.  PT IS OUT OF  MEDICATION AND IS DUE A SHOT 12/26/23.  PLEASE CALL REFILL IN FOR TESTOSTERONE AND SYRINGES.    Does the patient have less than a 3 day supply:  [x] Yes  [] No    Would you like a call back once the refill request has been completed: [x] Yes [] No    If the office needs to give you a call back, can they leave a voicemail: [x] Yes [] No    Julieta West Rep   12/21/23 14:58 EST         "

## 2023-12-22 RX ORDER — TESTOSTERONE CYPIONATE 200 MG/ML
INJECTION, SOLUTION INTRAMUSCULAR
Qty: 2 ML | Refills: 0 | Status: SHIPPED | OUTPATIENT
Start: 2023-12-22

## 2023-12-22 RX ORDER — SYRINGE WITH NEEDLE, 1 ML 25GX5/8"
SYRINGE, EMPTY DISPOSABLE MISCELLANEOUS
Qty: 6 EACH | Refills: 1 | Status: SHIPPED | OUTPATIENT
Start: 2023-12-22

## 2023-12-27 ENCOUNTER — OFFICE VISIT (OUTPATIENT)
Dept: UROLOGY | Facility: CLINIC | Age: 58
End: 2023-12-27
Payer: COMMERCIAL

## 2023-12-27 VITALS
DIASTOLIC BLOOD PRESSURE: 74 MMHG | BODY MASS INDEX: 39.1 KG/M2 | SYSTOLIC BLOOD PRESSURE: 134 MMHG | WEIGHT: 264 LBS | HEART RATE: 78 BPM | HEIGHT: 69 IN

## 2023-12-27 DIAGNOSIS — N40.1 BENIGN PROSTATIC HYPERPLASIA WITH URINARY HESITANCY: ICD-10-CM

## 2023-12-27 DIAGNOSIS — R39.11 BENIGN PROSTATIC HYPERPLASIA WITH URINARY HESITANCY: ICD-10-CM

## 2023-12-27 DIAGNOSIS — Z51.81 MEDICATION MONITORING ENCOUNTER: ICD-10-CM

## 2023-12-27 DIAGNOSIS — Z95.5 PRESENCE OF CORONARY ANGIOPLASTY IMPLANT AND GRAFT: ICD-10-CM

## 2023-12-27 DIAGNOSIS — E29.1 HYPOGONADISM IN MALE: Primary | ICD-10-CM

## 2023-12-27 DIAGNOSIS — Z86.39 HISTORY OF TYPE 2 DIABETES MELLITUS: ICD-10-CM

## 2023-12-27 LAB
BILIRUB BLD-MCNC: NEGATIVE MG/DL
CLARITY, POC: CLEAR
COLOR UR: YELLOW
EXPIRATION DATE: ABNORMAL
GLUCOSE UR STRIP-MCNC: ABNORMAL MG/DL
KETONES UR QL: NEGATIVE
LEUKOCYTE EST, POC: NEGATIVE
Lab: ABNORMAL
NITRITE UR-MCNC: NEGATIVE MG/ML
PH UR: 6 [PH] (ref 5–8)
PROT UR STRIP-MCNC: NEGATIVE MG/DL
RBC # UR STRIP: NEGATIVE /UL
SP GR UR: 1.02 (ref 1–1.03)
UROBILINOGEN UR QL: ABNORMAL

## 2023-12-27 RX ORDER — TAMSULOSIN HYDROCHLORIDE 0.4 MG/1
1 CAPSULE ORAL
Qty: 90 CAPSULE | Refills: 0 | Status: SHIPPED | OUTPATIENT
Start: 2023-12-27 | End: 2024-03-26

## 2023-12-27 NOTE — PROGRESS NOTES
"Chief Complaint  Hypogonadism    Subjective          Jeronimo Phillips is a 58 y.o. male who presents to [facility]  History of Present Illness  Routine follow up of hypogonadism and bph.Testosterone injections continues to be helpful with overall mood and energy. Continued general fatigue r/t disturbed sleep pattern with chronic pain. Denies dysuria or hematuria. Has noticed more difficulty with initiating urine stream and flow not as strong stating \"Takes a long time to get it started\" .Reluctant to begin more medications since taking multiple medications.  Denies retention or incontinence. Continued issues with constipation.Did have colonoscopy and report of few benign polyps removed.  Admits does not like taste of water and consumes diet mt dew soft drinks. Reluctant to add another medication. Has not been using prn sildenafil. Infected sebaceous scrotal cyst completely resolved and no longer having problems with testicular pain.       Objective   Vital Signs:   /74   Pulse 78   Ht 175.3 cm (69\")   Wt 120 kg (264 lb)   BMI 38.99 kg/m²     Allergies   Allergen Reactions    Clopidogrel Hives and Other (See Comments)    Clopidogrel Bisulfate Unknown - High Severity    Sulfamethoxazole-Trimethoprim Hives and Other (See Comments)    Batroxobin Unknown - High Severity      Past medical history:  has a past medical history of Acid reflux, Arthritis, Back pain, BPH (benign prostatic hyperplasia), Cervical spinal stenosis (07/09/2015), Cervical strain, Chest pain, Clotting disorder (2022), Condition not found, Coronary artery disease, Degeneration of lumbar intervertebral disc (03/12/2015), Degenerative cervical disc (11/17/2014), Diabetes (06/03/2021), ED (erectile dysfunction), Elevated cholesterol, Encounter for medication monitoring (06/03/2021), Erectile dysfunction (2010), Headache, Heart attack, Heart disease, History of arthritis, HLD (hyperlipidemia), HTN (hypertension), " Hyperlipemia, Hypogonadism male, Hypothyroid, Limb swelling, MI (myocardial infarction), Neck pain, Other cervical disc degeneration, unspecified cervical region (07/02/2015), Seasonal allergies, Sleep apnea, SOB (shortness of breath), Thyroid disease, Thyroid disorder, and Urinary incontinence (2022).   Past surgical history:  has a past surgical history that includes Bowel resection; Coronary angioplasty with stent (2010); Cervical laminectomy (08/24/2015); Cholecystectomy; Colonoscopy (2012); Cystoscopy; Gallbladder surgery; Cardiac surgery; Inguinal hernia repair (Left); Knee surgery (Right, 01/15/2020); Lumbar discectomy (Right, 10/14/2020); Neck surgery; Stomach surgery; Vasectomy; Band hemorrhoidectomy; Esophagogastroduodenoscopy (N/A, 10/26/2023); and Colonoscopy (N/A, 10/26/2023).  Personal history: family history includes Diabetes in his father; Heart attack in his mother; Heart disease in his father and mother; Hypertension in his father.  Social history:  reports that he has been smoking cigarettes. He has a 35.00 pack-year smoking history. He has been exposed to tobacco smoke. He has quit using smokeless tobacco. He reports that he does not currently use alcohol. He reports that he does not use drugs.    Review of Systems   Constitutional:  Negative for chills and fever.   Respiratory:  Negative for chest tightness.    Gastrointestinal:  Positive for constipation.   Genitourinary:  Negative for dysuria and penile discharge.        Hesitancy and weak flow   Musculoskeletal:  Positive for arthralgias.   Neurological:  Negative for dizziness.        Physical Exam  Vitals and nursing note reviewed.   Constitutional:       General: He is not in acute distress.     Appearance: Normal appearance. He is well-developed. He is not ill-appearing.      Comments: Ambulates without difficulty   Cardiovascular:      Rate and Rhythm: Normal rate and regular rhythm.      Heart sounds: Murmur heard.   Pulmonary:       Effort: Pulmonary effort is normal.      Breath sounds: Normal air entry.   Abdominal:      Palpations: There is no mass.      Tenderness: There is no rebound.   Genitourinary:     Penis: Circumcised.       Testes:         Right: Mass or tenderness not present.         Left: Mass or tenderness not present.      Epididymis:      Right: No tenderness.      Left: No tenderness.      Comments: Prostate approximately 35+ g no discrete nodules. Large external hemorrhoids.   Musculoskeletal:         General: Normal range of motion.   Lymphadenopathy:      Lower Body: No right inguinal adenopathy. No left inguinal adenopathy.   Skin:     General: Skin is warm and dry.   Neurological:      Mental Status: He is alert and oriented to person, place, and time.      Motor: Motor function is intact.   Psychiatric:         Mood and Affect: Mood normal.         Behavior: Behavior normal. Behavior is cooperative.         Thought Content: Thought content normal.         Judgment: Judgment normal.        Result Review :     CMP          7/29/2023    10:17   CMP   Glucose 157    BUN 18    Creatinine 0.99    EGFR 87.2    Sodium 140    Potassium 4.0    Chloride 103    Calcium 9.5    Total Protein 6.9    Albumin 4.5    Globulin 2.4    Total Bilirubin 0.5    Alkaline Phosphatase 41    AST (SGOT) 14    ALT (SGPT) 21    Albumin/Globulin Ratio 1.9    BUN/Creatinine Ratio 18.2    Anion Gap 11.8      CBC          7/29/2023    10:17 9/20/2023    15:04 10/10/2023    12:39   CBC   WBC 9.22  9.69     9.18    RBC 5.27  4.88     5.18    Hemoglobin 13.1  11.8     12.6    Hematocrit 42.0  39.4     41.3    MCV 79.7  80.7     79.7    MCH 24.9  24.2     24.3    MCHC 31.2  29.9     30.5    RDW 17.0  18.4     15.8    Platelets 245  201     225       Details          This result is from an external source.             A1C Last 3 Results          7/29/2023    10:17 9/20/2023    15:04   HGBA1C Last 3 Results   Hemoglobin A1C 9.60  9.4          Details           "This result is from an external source.             PSA   Date Value Ref Range Status   09/20/2023 1.35 0.0 - 4.0 ng/mL Final     Comment:     (note)  Testing performed on the Claudia Pro. Should not be  interpreted as evidence for the presence  or absence of malignancy.   12/29/2022 1.39 0.0 - 4.0 ng/mL Final     Comment:     (note)  Testing performed on the Claudia Pro. Should not be  interpreted as evidence for the presence  or absence of malignancy.   01/05/2022 1.22 0.00 - 4.00 ng/mL Final     Comment:     (note)  Testing performed on the Claudia Pro. Should not be  interpreted as evidence for the presence  or absence of malignancy.     No results found for: \"ESTRADIOL\"  Lab Results   Component Value Date    ESTROGEN 159 07/29/2023      Lab Results   Component Value Date    LH <0.30 07/29/2023      Lab Results   Component Value Date    FSH <0.30 07/29/2023      Lab Results   Component Value Date    PROLACTIN 8.41 07/29/2023      Lab Results   Component Value Date    TESTOSTEROTT 651 07/29/2023    TESTOSTEROTT 534 07/29/2023    BIOAVAILAB 402 (H) 07/29/2023    BIOAVAILAB 75.2 07/29/2023      Lab Results   Component Value Date    TESTOSTEROTT 651 07/29/2023    TESTOSTEROTT 534 07/29/2023    TESTFRE 11.6 07/29/2023    SEXMONB 16.1 (L) 07/29/2023         Results for orders placed or performed in visit on 12/27/23   POC Urinalysis Dipstick, Automated    Specimen: Urine   Result Value Ref Range    Color Yellow Yellow, Straw, Dark Yellow, Bessie    Clarity, UA Clear Clear    Specific Gravity  1.020 1.005 - 1.030    pH, Urine 6.0 5.0 - 8.0    Leukocytes Negative Negative    Nitrite, UA Negative Negative    Protein, POC Negative Negative mg/dL    Glucose,  mg/dL (A) Negative mg/dL    Ketones, UA Negative Negative    Urobilinogen, UA 0.2 E.U./dL Normal, 0.2 E.U./dL    Bilirubin Negative Negative    Blood, UA Negative Negative    Lot Number [id number]     Expiration Date 10/2,024             Assessment and Plan    Diagnoses " and all orders for this visit:    1. Hypogonadism in male (Primary)  -     POC Urinalysis Dipstick, Automated    2. History of type 2 diabetes mellitus    3. Presence of coronary angioplasty implant and graft    4. Medication monitoring encounter    5. Benign prostatic hyperplasia with urinary hesitancy  -     tamsulosin (FLOMAX) 0.4 MG capsule 24 hr capsule; Take 1 capsule by mouth every night at bedtime for 90 days. 30 minutes after meal stop if develops adverse reaction or dizziness  Dispense: 90 capsule; Refill: 0    GERI performed. Trial on tamsulosin 1 po evening 30 minutes after meal to see if helpful with urinary hesitancy and flow. May consider changing to daily tadalafil 5 mg for help with bph and also help with erection concerns in future.Could then stop sildenafil prn.  Patient reluctant with adding more medications.  Continue testosterone 200mg im q 14 days 2 vials for 28 day supply. Self -injections several years.  kendra reviewed. Labs up to date.  HA1C  contiues to be high and advised patient to discuss diabetes and possible medication adjustment based on last ha1c. Discussed long term poorly controlled diabetes may affect bladder function and GI system.        Will see back for follow up end of March beginning of April and order will order testosterone panel at that time. GERI performed and discussed prostate enlargement. PSA 1.35 9/2023    Urine drug screens followed per pain management.       Follow Up   Return in about 3 months (around 3/27/2024) for next follow up hypogonadism/bph.  Patient was given instructions and counseling regarding his condition or for health maintenance advice. Please see specific information pulled into the AVS if appropriate.     SVETLANA Roberto

## 2024-01-23 DIAGNOSIS — E29.1 HYPOGONADISM IN MALE: ICD-10-CM

## 2024-01-23 RX ORDER — TESTOSTERONE CYPIONATE 200 MG/ML
INJECTION, SOLUTION INTRAMUSCULAR
Qty: 2 ML | Refills: 0 | Status: SHIPPED | OUTPATIENT
Start: 2024-01-23

## 2024-02-21 DIAGNOSIS — E29.1 HYPOGONADISM IN MALE: ICD-10-CM

## 2024-02-23 ENCOUNTER — TELEPHONE (OUTPATIENT)
Dept: UROLOGY | Facility: CLINIC | Age: 59
End: 2024-02-23

## 2024-02-23 RX ORDER — TESTOSTERONE CYPIONATE 200 MG/ML
INJECTION, SOLUTION INTRAMUSCULAR
Qty: 2 ML | Refills: 0 | Status: SHIPPED | OUTPATIENT
Start: 2024-02-23

## 2024-02-23 NOTE — TELEPHONE ENCOUNTER
Caller: Jeronimo Phillips    Relationship: Self    Best call back number: [phone]    Requested Prescriptions: TESTOSTERONE,NEEDLES AND SYRINGES  Requested Prescriptions      No prescriptions requested or ordered in this encounter        Pharmacy where request should be sent:  [facility]    Last office visit with prescribing clinician: 12/27/2023   Last telemedicine visit with prescribing clinician: Visit date not found   Next office visit with prescribing clinician: 3/27/2024     Additional details provided by patient: PT IS OUT    Does the patient have less than a 3 day supply:  [] Yes  [x] No    Would you like a call back once the refill request has been completed: [] Yes [x] No    If the office needs to give you a call back, can they leave a voicemail: [] Yes [x] No    Julieta Mays Rep   02/23/24 10:15 EST

## 2024-03-22 ENCOUNTER — TELEPHONE (OUTPATIENT)
Dept: UROLOGY | Facility: CLINIC | Age: 59
End: 2024-03-22
Payer: COMMERCIAL

## 2024-03-22 DIAGNOSIS — E29.1 HYPOGONADISM IN MALE: ICD-10-CM

## 2024-03-22 RX ORDER — TESTOSTERONE CYPIONATE 200 MG/ML
INJECTION, SOLUTION INTRAMUSCULAR
Qty: 2 ML | Refills: 0 | Status: SHIPPED | OUTPATIENT
Start: 2024-03-22

## 2024-03-22 NOTE — TELEPHONE ENCOUNTER
"     Caller: Jeronimo Phillips     Relationship: SELF    Best call back number: [phone]     Requested Prescriptions: Syringe/Needle, Disp, (B-D 3CC LUER-REYES SYR 25GX1\") 25G X 1\" 3 ML misc     Testosterone Cypionate (DEPOTESTOTERONE CYPIONATE) 200 MG/ML injection     Requested Prescriptions      No prescriptions requested or ordered in this encounter        Pharmacy where request should be sent:  [facility] [id number]  [place], [place] - [address] [facility] [address] [facility] [phone] [phone] FX     Last office visit with prescribing clinician: 12/27/2023   Last telemedicine visit with prescribing clinician: Visit date not found   Next office visit with prescribing clinician: 3/27/2024     Additional details provided by patient: PT IS DUE FOR AN INJECTION THIS WEEKEND.    Does the patient have less than a 3 day supply:  [x] Yes  [] No    Would you like a call back once the refill request has been completed: [] Yes [x] No    If the office needs to give you a call back, can they leave a voicemail: [] Yes [x] No    Julieta Pederson Rep   03/22/24 10:14 EDT     "

## 2024-03-30 DIAGNOSIS — R39.11 BENIGN PROSTATIC HYPERPLASIA WITH URINARY HESITANCY: ICD-10-CM

## 2024-03-30 DIAGNOSIS — N40.1 BENIGN PROSTATIC HYPERPLASIA WITH URINARY HESITANCY: ICD-10-CM

## 2024-04-02 RX ORDER — TAMSULOSIN HYDROCHLORIDE 0.4 MG/1
CAPSULE ORAL
Qty: 90 CAPSULE | Refills: 0 | Status: SHIPPED | OUTPATIENT
Start: 2024-04-02

## 2024-04-08 ENCOUNTER — OFFICE VISIT (OUTPATIENT)
Dept: UROLOGY | Facility: CLINIC | Age: 59
End: 2024-04-08
Payer: COMMERCIAL

## 2024-04-08 VITALS
SYSTOLIC BLOOD PRESSURE: 108 MMHG | DIASTOLIC BLOOD PRESSURE: 65 MMHG | BODY MASS INDEX: 39.04 KG/M2 | WEIGHT: 263.6 LBS | HEART RATE: 78 BPM | HEIGHT: 69 IN

## 2024-04-08 DIAGNOSIS — R39.11 BENIGN PROSTATIC HYPERPLASIA WITH URINARY HESITANCY: ICD-10-CM

## 2024-04-08 DIAGNOSIS — E29.1 HYPOGONADISM IN MALE: Primary | ICD-10-CM

## 2024-04-08 DIAGNOSIS — Z86.39 HISTORY OF TYPE 2 DIABETES MELLITUS: ICD-10-CM

## 2024-04-08 DIAGNOSIS — Z51.81 MEDICATION MONITORING ENCOUNTER: ICD-10-CM

## 2024-04-08 DIAGNOSIS — N40.1 BENIGN PROSTATIC HYPERPLASIA WITH URINARY HESITANCY: ICD-10-CM

## 2024-04-08 DIAGNOSIS — Z95.5 PRESENCE OF CORONARY ANGIOPLASTY IMPLANT AND GRAFT: ICD-10-CM

## 2024-04-08 LAB
BILIRUB BLD-MCNC: NEGATIVE MG/DL
CLARITY, POC: CLEAR
COLOR UR: ABNORMAL
EXPIRATION DATE: ABNORMAL
GLUCOSE UR STRIP-MCNC: ABNORMAL MG/DL
KETONES UR QL: ABNORMAL
LEUKOCYTE EST, POC: NEGATIVE
Lab: ABNORMAL
NITRITE UR-MCNC: NEGATIVE MG/ML
PH UR: 6 [PH] (ref 5–8)
PROT UR STRIP-MCNC: ABNORMAL MG/DL
RBC # UR STRIP: NEGATIVE /UL
SP GR UR: 1.02 (ref 1–1.03)
UROBILINOGEN UR QL: ABNORMAL

## 2024-04-08 PROCEDURE — 81003 URINALYSIS AUTO W/O SCOPE: CPT | Performed by: NURSE PRACTITIONER

## 2024-04-08 PROCEDURE — 99214 OFFICE O/P EST MOD 30 MIN: CPT | Performed by: NURSE PRACTITIONER

## 2024-04-08 RX ORDER — METAXALONE 800 MG/1
TABLET ORAL
COMMUNITY

## 2024-04-08 RX ORDER — GLYBURIDE 2.5 MG/1
TABLET ORAL
COMMUNITY

## 2024-04-08 RX ORDER — ESOMEPRAZOLE MAGNESIUM 40 MG/1
40 CAPSULE, DELAYED RELEASE ORAL DAILY
COMMUNITY
Start: 2024-03-20

## 2024-04-08 RX ORDER — NEBIVOLOL 10 MG/1
1 TABLET ORAL DAILY
COMMUNITY

## 2024-04-08 RX ORDER — MECLIZINE HYDROCHLORIDE 25 MG/1
TABLET ORAL
COMMUNITY

## 2024-04-25 ENCOUNTER — TELEPHONE (OUTPATIENT)
Dept: UROLOGY | Facility: CLINIC | Age: 59
End: 2024-04-25
Payer: COMMERCIAL

## 2024-04-25 DIAGNOSIS — E29.1 HYPOGONADISM IN MALE: ICD-10-CM

## 2024-04-25 RX ORDER — TESTOSTERONE CYPIONATE 200 MG/ML
INJECTION, SOLUTION INTRAMUSCULAR
Qty: 2 ML | Refills: 0 | Status: SHIPPED | OUTPATIENT
Start: 2024-04-25

## 2024-04-25 NOTE — TELEPHONE ENCOUNTER
Testosterone Cypionate (DEPOTESTOTERONE CYPIONATE) 200 MG/ML injection        Sig: INJECT 1ML INTRAMUSCULARLY ONCE EVERY 14 DAYS        Sent to pharmacy as: Testosterone Cypionate 200 MG/ML Intramuscular Solution (DEPOTESTOTERONE CYPIONATE)        Class: Normal             Pt called and would like this rx sent to pharmacy

## 2024-04-29 PROCEDURE — 99285 EMERGENCY DEPT VISIT HI MDM: CPT

## 2024-04-30 ENCOUNTER — APPOINTMENT (OUTPATIENT)
Dept: CT IMAGING | Facility: HOSPITAL | Age: 59
End: 2024-04-30
Payer: COMMERCIAL

## 2024-04-30 ENCOUNTER — HOSPITAL ENCOUNTER (EMERGENCY)
Facility: HOSPITAL | Age: 59
Discharge: HOME | End: 2024-04-30
Attending: EMERGENCY MEDICINE
Payer: COMMERCIAL

## 2024-04-30 VITALS
OXYGEN SATURATION: 93 % | HEART RATE: 100 BPM | SYSTOLIC BLOOD PRESSURE: 130 MMHG | TEMPERATURE: 97.9 F | RESPIRATION RATE: 18 BRPM | BODY MASS INDEX: 37.29 KG/M2 | DIASTOLIC BLOOD PRESSURE: 87 MMHG | WEIGHT: 251.77 LBS | HEIGHT: 69 IN

## 2024-04-30 DIAGNOSIS — K59.03 DRUG-INDUCED CONSTIPATION: Primary | ICD-10-CM

## 2024-04-30 DIAGNOSIS — D72.829 LEUKOCYTOSIS, UNSPECIFIED TYPE: ICD-10-CM

## 2024-04-30 LAB
ALBUMIN SERPL-MCNC: 4.5 G/DL (ref 3.5–5.2)
ALBUMIN/GLOB SERPL: 1.4 G/DL
ALP SERPL-CCNC: 47 U/L (ref 39–117)
ALT SERPL W P-5'-P-CCNC: 21 U/L (ref 1–41)
ANION GAP SERPL CALCULATED.3IONS-SCNC: 14.1 MMOL/L (ref 5–15)
AST SERPL-CCNC: 19 U/L (ref 1–40)
BACTERIA UR QL AUTO: NORMAL /HPF
BASOPHILS # BLD AUTO: 0.12 10*3/MM3 (ref 0–0.2)
BASOPHILS NFR BLD AUTO: 0.6 % (ref 0–1.5)
BILIRUB SERPL-MCNC: 0.5 MG/DL (ref 0–1.2)
BILIRUB UR QL STRIP: NEGATIVE
BUN SERPL-MCNC: 22 MG/DL (ref 8–23)
BUN/CREAT SERPL: 19.3 (ref 7–25)
CALCIUM SPEC-SCNC: 9.9 MG/DL (ref 8.6–10.5)
CHLORIDE SERPL-SCNC: 100 MMOL/L (ref 98–107)
CLARITY UR: CLEAR
CO2 SERPL-SCNC: 21.9 MMOL/L (ref 22–29)
COLOR UR: ABNORMAL
CREAT SERPL-MCNC: 1.14 MG/DL (ref 0.76–1.27)
D-LACTATE SERPL-SCNC: 1.3 MMOL/L (ref 0.5–2)
DEPRECATED RDW RBC AUTO: 54.2 FL (ref 37–54)
EGFRCR SERPLBLD CKD-EPI 2021: 73.6 ML/MIN/1.73
EOSINOPHIL # BLD AUTO: 0.27 10*3/MM3 (ref 0–0.4)
EOSINOPHIL NFR BLD AUTO: 1.4 % (ref 0.3–6.2)
ERYTHROCYTE [DISTWIDTH] IN BLOOD BY AUTOMATED COUNT: 21.2 % (ref 12.3–15.4)
GLOBULIN UR ELPH-MCNC: 3.3 GM/DL
GLUCOSE SERPL-MCNC: 150 MG/DL (ref 65–99)
GLUCOSE UR STRIP-MCNC: NEGATIVE MG/DL
HCT VFR BLD AUTO: 42.3 % (ref 37.5–51)
HGB BLD-MCNC: 12.4 G/DL (ref 13–17.7)
HGB UR QL STRIP.AUTO: NEGATIVE
HOLD SPECIMEN: NORMAL
HOLD SPECIMEN: NORMAL
HYALINE CASTS UR QL AUTO: NORMAL /LPF
IMM GRANULOCYTES # BLD AUTO: 0.09 10*3/MM3 (ref 0–0.05)
IMM GRANULOCYTES NFR BLD AUTO: 0.5 % (ref 0–0.5)
KETONES UR QL STRIP: ABNORMAL
LEUKOCYTE ESTERASE UR QL STRIP.AUTO: ABNORMAL
LIPASE SERPL-CCNC: 25 U/L (ref 13–60)
LYMPHOCYTES # BLD AUTO: 2.75 10*3/MM3 (ref 0.7–3.1)
LYMPHOCYTES NFR BLD AUTO: 14.1 % (ref 19.6–45.3)
MCH RBC QN AUTO: 21.8 PG (ref 26.6–33)
MCHC RBC AUTO-ENTMCNC: 29.3 G/DL (ref 31.5–35.7)
MCV RBC AUTO: 74.3 FL (ref 79–97)
MICROCYTES BLD QL: NORMAL
MONOCYTES # BLD AUTO: 1.38 10*3/MM3 (ref 0.1–0.9)
MONOCYTES NFR BLD AUTO: 7.1 % (ref 5–12)
NEUTROPHILS NFR BLD AUTO: 14.94 10*3/MM3 (ref 1.7–7)
NEUTROPHILS NFR BLD AUTO: 76.3 % (ref 42.7–76)
NITRITE UR QL STRIP: NEGATIVE
NRBC BLD AUTO-RTO: 0 /100 WBC (ref 0–0.2)
PH UR STRIP.AUTO: <=5 [PH] (ref 5–8)
PLAT MORPH BLD: NORMAL
PLATELET # BLD AUTO: 231 10*3/MM3 (ref 140–450)
PMV BLD AUTO: 10.1 FL (ref 6–12)
POTASSIUM SERPL-SCNC: 4.2 MMOL/L (ref 3.5–5.2)
PROT SERPL-MCNC: 7.8 G/DL (ref 6–8.5)
PROT UR QL STRIP: ABNORMAL
RBC # BLD AUTO: 5.69 10*6/MM3 (ref 4.14–5.8)
RBC # UR STRIP: NORMAL /HPF
REF LAB TEST METHOD: NORMAL
SODIUM SERPL-SCNC: 136 MMOL/L (ref 136–145)
SP GR UR STRIP: >=1.03 (ref 1–1.03)
SQUAMOUS #/AREA URNS HPF: NORMAL /HPF
UROBILINOGEN UR QL STRIP: ABNORMAL
WBC # UR STRIP: NORMAL /HPF
WBC MORPH BLD: NORMAL
WBC NRBC COR # BLD AUTO: 19.55 10*3/MM3 (ref 3.4–10.8)
WHOLE BLOOD HOLD COAG: NORMAL
WHOLE BLOOD HOLD SPECIMEN: NORMAL

## 2024-04-30 PROCEDURE — 81001 URINALYSIS AUTO W/SCOPE: CPT | Performed by: EMERGENCY MEDICINE

## 2024-04-30 PROCEDURE — 74177 CT ABD & PELVIS W/CONTRAST: CPT

## 2024-04-30 PROCEDURE — 36415 COLL VENOUS BLD VENIPUNCTURE: CPT

## 2024-04-30 PROCEDURE — 83690 ASSAY OF LIPASE: CPT

## 2024-04-30 PROCEDURE — 25510000001 IOPAMIDOL PER 1 ML: Performed by: EMERGENCY MEDICINE

## 2024-04-30 PROCEDURE — 83605 ASSAY OF LACTIC ACID: CPT

## 2024-04-30 PROCEDURE — 85007 BL SMEAR W/DIFF WBC COUNT: CPT

## 2024-04-30 PROCEDURE — 80053 COMPREHEN METABOLIC PANEL: CPT

## 2024-04-30 PROCEDURE — 85025 COMPLETE CBC W/AUTO DIFF WBC: CPT

## 2024-04-30 RX ORDER — MINERAL OIL 100 G/100G
1 OIL RECTAL ONCE
Status: COMPLETED | OUTPATIENT
Start: 2024-04-30 | End: 2024-04-30

## 2024-04-30 RX ORDER — SODIUM CHLORIDE 0.9 % (FLUSH) 0.9 %
10 SYRINGE (ML) INJECTION AS NEEDED
Status: DISCONTINUED | OUTPATIENT
Start: 2024-04-30 | End: 2024-04-30 | Stop reason: HOSPADM

## 2024-04-30 RX ORDER — MAGNESIUM CARB/ALUMINUM HYDROX 105-160MG
296 TABLET,CHEWABLE ORAL ONCE
Status: COMPLETED | OUTPATIENT
Start: 2024-04-30 | End: 2024-04-30

## 2024-04-30 RX ADMIN — IOPAMIDOL 100 ML: 755 INJECTION, SOLUTION INTRAVENOUS at 02:29

## 2024-04-30 RX ADMIN — DOCUSATE SODIUM 1 ENEMA: 283 LIQUID RECTAL at 03:26

## 2024-04-30 RX ADMIN — NALOXEGOL OXALATE 25 MG: 12.5 TABLET, FILM COATED ORAL at 03:26

## 2024-04-30 RX ADMIN — MINERAL OIL 1 ENEMA: 100 ENEMA RECTAL at 05:33

## 2024-04-30 RX ADMIN — MAGNESIUM CITRATE 296 ML: 1.75 LIQUID ORAL at 07:32

## 2024-04-30 NOTE — DISCHARGE INSTRUCTIONS
Medication as prescribed for your opioid-induced constipation.    Drink plenty of fluids.    Follow-up with your PCP

## 2024-04-30 NOTE — ED PROVIDER NOTES
Time: 12:51 AM EDT  Date of encounter:  4/29/2024  Independent Historian/Clinical History and Information was obtained by:   {Blank multiple:31892}    History is limited by: {Limited History:63590}    Chief Complaint: ***      History of Present Illness:  Patient is a 58 y.o. year old male who presents to the emergency department for evaluation of ***    HPI    Patient Care Team  Primary Care Provider: Danny Mccartney MD    Past Medical History:     Allergies   Allergen Reactions    Clopidogrel Hives and Other (See Comments)    Clopidogrel Bisulfate Unknown - High Severity    Sulfamethoxazole-Trimethoprim Hives and Other (See Comments)    Batroxobin Unknown - High Severity     Past Medical History:   Diagnosis Date    Acid reflux     Arthritis     Back pain     BPH (benign prostatic hyperplasia)     Cervical spinal stenosis 07/09/2015    C3-4 C4-5 WITH MYELOMALACIA C4-5 AND C5-6    Cervical strain     Chest pain     Clotting disorder 2022    Hemroids    Condition not found     MEATAL STENOSIS    Coronary artery disease     Degeneration of lumbar intervertebral disc 03/12/2015    Degenerative cervical disc 11/17/2014    Diabetes 06/03/2021    ED (erectile dysfunction)     Elevated cholesterol     Encounter for medication monitoring 06/03/2021    Erectile dysfunction 2010    Headache     Heart attack     Heart disease     History of arthritis     HLD (hyperlipidemia)     HTN (hypertension)     Hyperlipemia     Hypogonadism male     Hypothyroid     Limb swelling     MI (myocardial infarction)     Neck pain     Other cervical disc degeneration, unspecified cervical region 07/02/2015    Seasonal allergies     Sleep apnea     SOB (shortness of breath)     Thyroid disease     Thyroid disorder     Urinary incontinence 2022     Past Surgical History:   Procedure Laterality Date    CARDIAC SURGERY      CERVICAL LAMINECTOMY  08/24/2015    C3-4 C4-5 C5-6    CHOLECYSTECTOMY      COLON RESECTION      COLONOSCOPY  2012     COLONOSCOPY N/A 10/26/2023    Procedure: COLONOSCOPY WITH POLYPECTOMIES;  Surgeon: Matteo Leavitt MD;  Location: [facility] ENDOSCOPY;  Service: Gastroenterology;  Laterality: N/A;  COLON POLYPS, HEMORRHOIDS    CORONARY ANGIOPLASTY WITH STENT PLACEMENT  2010    CYSTOSCOPY      ENDOSCOPY N/A 10/26/2023    Procedure: ESOPHAGOGASTRODUODENOSCOPY WITH BIOPSIES;  Surgeon: Matteo Leavitt MD;  Location: [facility] ENDOSCOPY;  Service: Gastroenterology;  Laterality: N/A;  NORMAL    GALLBLADDER SURGERY      HEMORRHOID BANDING      INGUINAL HERNIA REPAIR Left     KNEE SURGERY Right 01/15/2020    REPAIR    LUMBAR DISCECTOMY Right 10/14/2020    L3-4 DISECTOMY WITH LAMINECTOMY MINIMAL INVASIVE    NECK SURGERY      STOMACH SURGERY      VASECTOMY       Family History   Problem Relation Age of Onset    Heart disease Mother     Heart attack Mother     Heart disease Father     Diabetes Father     Hypertension Father     Colon cancer Neg Hx        Home Medications:  Prior to Admission medications    Medication Sig Start Date End Date Taking? Authorizing Provider   amLODIPine (NORVASC) 10 MG tablet  9/13/21   Katelyn Salinas MD   aspirin 81 MG EC tablet aspirin 81 mg oral tablet,delayed release (DR/EC) take 1 tablet (81 mg) by oral route once daily   Active    Katelyn Salinas MD   bisoprolol (ZEBeta) 5 MG tablet Take 1 tablet by mouth Daily. 7/31/23   Katelyn Salinas MD   cholecalciferol (VITAMIN D3) 25 MCG (1000 UT) tablet Vitamin D3 1,000 unit oral tablet take 1 tablet by oral route daily   Active    Katelyn Salinas MD   cyclobenzaprine (FLEXERIL) 10 MG tablet  9/2/21   Katelyn Salinas MD   docusate sodium (Colace) 100 MG capsule Take 1 capsule by mouth 2 (Two) Times a Day. 10/10/23   Kelly Jorge APRN   esomeprazole (nexIUM) 40 MG capsule Take 1 capsule by mouth Daily. 3/20/24   Katelyn Salinas MD   gabapentin (NEURONTIN) 800 MG tablet  9/7/21   Katelyn Salinas MD   glipizide  "(GLUCOTROL) 5 MG tablet glipizide 5 mg oral tablet take 1 tablet (5 mg) by oral route once daily before a meal   Active    Katelyn Salinas MD   glyburide (DIAbeta) 2.5 MG tablet     Katelyn Salinas MD   HYDROcodone-acetaminophen (NORCO)  MG per tablet Take 1 tablet every 4 hours by oral route as needed for 30 days. 9/19/23   Katelyn Salinas MD   levothyroxine (SYNTHROID, LEVOTHROID) 200 MCG tablet Take 1 tablet by mouth Daily. 7/31/23   Katelyn Salinas MD   lisinopril (PRINIVIL,ZESTRIL) 5 MG tablet Take 1 tablet by mouth Daily. 8/30/23   Katelyn Salinas MD   lubiprostone (Amitiza) 24 MCG capsule Take 1 capsule by mouth 2 (Two) Times a Day With Meals. 10/10/23   Kelly Jorge APRN   meclizine (ANTIVERT) 25 MG tablet     Katelyn Salinas MD   metaxalone (SKELAXIN) 800 MG tablet     Katelyn Salinas MD   metFORMIN (GLUCOPHAGE) 1000 MG tablet Take 1 tablet by mouth 2 (Two) Times a Day With Meals. 8/28/23   Katelyn Salinas MD   nabumetone (RELAFEN) 500 MG tablet  12/14/22   Katelyn Salinas MD   nebivolol (BYSTOLIC) 10 MG tablet Take 1 tablet by mouth Daily.    Katelyn Salinas MD   Needle, Disp, (B-D HYPODERMIC NEEDLE 18GX1.5\") 18G X 1-1/2\" misc USE FOR TESTOSTERONE INJECTIONS EVERY 2 WEEKS 9/15/23   Erika Diehl APRN   polyethylene glycol (GoLYTELY) 236 g solution Take per office instructions 10/10/23   Kelly Jorge APRN   rosuvastatin (CRESTOR) 40 MG tablet Take 1 tablet by mouth Daily. 7/31/23   Katelyn Salinas MD   sildenafil (VIAGRA) 100 MG tablet Viagra 100 mg oral tablet take 1 tablet (100 mg) by oral route once daily as needed approximately 1 hour before sexual activity   Suspended    Katelyn Salinas MD   Syringe/Needle, Disp, (B-D 3CC LUER-REYES SYR 25GX1\") 25G X 1\" 3 ML misc USE WITH TESTOSTERONE 12/22/23   Erika Diehl, SVETLANA   tamsulosin (FLOMAX) 0.4 MG capsule 24 hr capsule TAKE 1 CAPSULE BY MOUTH " "EVERY NIGHT AT BEDTIME *TAKE WITH FOOD* STOP IF ADVERSE REACTION OF DIZZINESS DEVLOPS 4/2/24   Erika Diehl APRN   Testosterone Cypionate (DEPOTESTOTERONE CYPIONATE) 200 MG/ML injection INJECT 1 MILLILITER INTRAMUSCULARLY EVERY 14 DAYS 4/25/24   Erika Diehl APRN        Social History:   Social History     Tobacco Use    Smoking status: Every Day     Current packs/day: 1.00     Average packs/day: 1 pack/day for 35.0 years (35.0 ttl pk-yrs)     Types: Cigarettes     Passive exposure: Current    Smokeless tobacco: Former    Tobacco comments:     SMOKING 21-30 YEARS   Vaping Use    Vaping status: Never Used   Substance Use Topics    Alcohol use: Not Currently     Comment: OCCASIONALLY    Drug use: Never         Review of Systems:  Review of Systems     Physical Exam:  /75   Pulse 95   Temp 97.9 °F (36.6 °C) (Oral)   Resp 18   Ht 175.3 cm (69\")   Wt 114 kg (251 lb 12.3 oz)   SpO2 100%   BMI 37.18 kg/m²     Physical Exam   ***          Procedures:  Procedures      Medical Decision Making:      Comorbidities that affect care:    {Comorbidities that affect care:39100}    External Notes reviewed:    {External Note review (Optional):58134}      The following orders were placed and all results were independently analyzed by me:  Orders Placed This Encounter   Procedures    XR Abdomen KUB    Pierce Draw    Comprehensive Metabolic Panel    Lipase    Urinalysis With Microscopic If Indicated (No Culture) - Urine, Clean Catch    Lactic Acid, Plasma    CBC Auto Differential    Scan Slide    NPO Diet NPO Type: Strict NPO    Undress & Gown    Insert Peripheral IV    CBC & Differential    Green Top (Gel)    Lavender Top    Gold Top - SST    Light Blue Top       Medications Given in the Emergency Department:  Medications   sodium chloride 0.9 % flush 10 mL (has no administration in time range)        ED Course:         Labs:    Lab Results (last 24 hours)       Procedure Component Value Units Date/Time    " CBC & Differential [[id number]]  (Abnormal) Collected: 04/30/24 0011    Specimen: Blood from Arm, Right Updated: 04/30/24 0043    Narrative:      The following orders were created for panel order CBC & Differential.  Procedure                               Abnormality         Status                     ---------                               -----------         ------                     CBC Auto Differential[[id number]]        Abnormal            Final result               Scan Slide[[id number]]                                       Final result                 Please view results for these tests on the individual orders.    Comprehensive Metabolic Panel [[id number]]  (Abnormal) Collected: 04/30/24 0011    Specimen: Blood from Arm, Right Updated: 04/30/24 0036     Glucose 150 mg/dL      BUN 22 mg/dL      Creatinine 1.14 mg/dL      Sodium 136 mmol/L      Potassium 4.2 mmol/L      Chloride 100 mmol/L      CO2 21.9 mmol/L      Calcium 9.9 mg/dL      Total Protein 7.8 g/dL      Albumin 4.5 g/dL      ALT (SGPT) 21 U/L      AST (SGOT) 19 U/L      Alkaline Phosphatase 47 U/L      Total Bilirubin 0.5 mg/dL      Globulin 3.3 gm/dL      A/G Ratio 1.4 g/dL      BUN/Creatinine Ratio 19.3     Anion Gap 14.1 mmol/L      eGFR 73.6 mL/min/1.73     Narrative:      GFR Normal >60  Chronic Kidney Disease <60  Kidney Failure <15      Lipase [[id number]]  (Normal) Collected: 04/30/24 0011    Specimen: Blood from Arm, Right Updated: 04/30/24 0036     Lipase 25 U/L     Lactic Acid, Plasma [[id number]]  (Normal) Collected: 04/30/24 0011    Specimen: Blood from Arm, Right Updated: 04/30/24 0031     Lactate 1.3 mmol/L     CBC Auto Differential [[id number]]  (Abnormal) Collected: 04/30/24 0011    Specimen: Blood from Arm, Right Updated: 04/30/24 0030     WBC 19.55 10*3/mm3      RBC 5.69 10*6/mm3      Hemoglobin 12.4 g/dL      Hematocrit 42.3 %      MCV 74.3 fL      MCH 21.8 pg      MCHC 29.3 g/dL      RDW 21.2 %      RDW-SD 54.2 fl      MPV 10.1 fL       Platelets 231 10*3/mm3      Neutrophil % 76.3 %      Lymphocyte % 14.1 %      Monocyte % 7.1 %      Eosinophil % 1.4 %      Basophil % 0.6 %      Immature Grans % 0.5 %      Neutrophils, Absolute 14.94 10*3/mm3      Lymphocytes, Absolute 2.75 10*3/mm3      Monocytes, Absolute 1.38 10*3/mm3      Eosinophils, Absolute 0.27 10*3/mm3      Basophils, Absolute 0.12 10*3/mm3      Immature Grans, Absolute 0.09 10*3/mm3      nRBC 0.0 /100 WBC     Scan Slide [[id number]] Collected: 04/30/24 0011    Specimen: Blood from Arm, Right Updated: 04/30/24 0043     Microcytes Slight/1+     WBC Morphology Normal     Platelet Morphology Normal             Imaging:    No Radiology Exams Resulted Within Past 24 Hours      Differential Diagnosis and Discussion:    {Differentials:98921}    {Independent Review of (Optional):65580}    MDM     Amount and/or Complexity of Data Reviewed  Clinical lab tests: reviewed  Decide to obtain previous medical records or to obtain history from someone other than the patient: yes         {Critical Care:39079}    {SEPSIS RECOGNITION:28568}    Patient Care Considerations:    {Considerations (Optional):97209}      Consultants/Shared Management Plan:    {Shared Management Plan (Optional):18438}    Social Determinants of Health:    {Social Determinants of Health (Optional):90416}      Disposition and Care Coordination:    {Admission consideration:26520}    {Discharge (Optional):57588}    Final diagnoses:   None        ED Disposition       None            This medical record created using voice recognition software.

## 2024-04-30 NOTE — ED PROVIDER NOTES
Time: 1:33 AM EDT  Date of encounter:  4/29/2024  Independent Historian/Clinical History and Information was obtained by:   Patient    History is limited by: N/A    Chief Complaint: Constipation, abdominal pain      History of Present Illness:  Patient is a 58 y.o. year old male who presents to the emergency department for evaluation of constipation and abdominal pain.  Patient has chronic constipation since he was young.  Worsened in the last few years due to chronic opiate usage for her back pain.  Patient states he only goes about once a week and usually has to use Ex-Lax or magnesium citrate to be able to go.  Patient states for about the last 10 days he has been unable to have a bowel movement.  The last 3 days he has taken 4 Ex-Lax each daily and use a saline enema and been unable to have a bowel movement.  Patient states he has been straining so hard that his hemorrhoids were bleeding.  Patient complaining of diffuse abdominal pain.  Nausea no vomiting.  No fever.    HPI    Patient Care Team  Primary Care Provider: Danny Mccartney MD    Past Medical History:     Allergies   Allergen Reactions    Clopidogrel Hives and Other (See Comments)    Clopidogrel Bisulfate Unknown - High Severity    Sulfamethoxazole-Trimethoprim Hives and Other (See Comments)    Batroxobin Unknown - High Severity     Past Medical History:   Diagnosis Date    Acid reflux     Arthritis     Back pain     BPH (benign prostatic hyperplasia)     Cervical spinal stenosis 07/09/2015    C3-4 C4-5 WITH MYELOMALACIA C4-5 AND C5-6    Cervical strain     Chest pain     Clotting disorder 2022    Hemroids    Condition not found     MEATAL STENOSIS    Coronary artery disease     Degeneration of lumbar intervertebral disc 03/12/2015    Degenerative cervical disc 11/17/2014    Diabetes 06/03/2021    ED (erectile dysfunction)     Elevated cholesterol     Encounter for medication monitoring 06/03/2021    Erectile dysfunction 2010    Headache     Heart  attack     Heart disease     History of arthritis     HLD (hyperlipidemia)     HTN (hypertension)     Hyperlipemia     Hypogonadism male     Hypothyroid     Limb swelling     MI (myocardial infarction)     Neck pain     Other cervical disc degeneration, unspecified cervical region 07/02/2015    Seasonal allergies     Sleep apnea     SOB (shortness of breath)     Thyroid disease     Thyroid disorder     Urinary incontinence 2022     Past Surgical History:   Procedure Laterality Date    CARDIAC SURGERY      CERVICAL LAMINECTOMY  08/24/2015    C3-4 C4-5 C5-6    CHOLECYSTECTOMY      COLON RESECTION      COLONOSCOPY  2012    COLONOSCOPY N/A 10/26/2023    Procedure: COLONOSCOPY WITH POLYPECTOMIES;  Surgeon: Matteo Leavitt MD;  Location: [facility] END[facility]COPY;  Service: Gastroenterology;  Laterality: N/A;  COLON POLYPS, HEMORRHOIDS    CORONARY ANGIOPLASTY WITH STENT PLACEMENT  2010    CYSTOSCOPY      ENDOSCOPY N/A 10/26/2023    Procedure: ESOPHAGOGASTRODUODENOSCOPY WITH BIOPSIES;  Surgeon: Matteo Leavitt MD;  Location: [facility] ENDOSCOPY;  Service: Gastroenterology;  Laterality: N/A;  NORMAL    GALLBLADDER SURGERY      HEMORRHOID BANDING      INGUINAL HERNIA REPAIR Left     KNEE SURGERY Right 01/15/2020    REPAIR    LUMBAR DISCECTOMY Right 10/14/2020    L3-4 DISECTOMY WITH LAMINECTOMY MINIMAL INVASIVE    NECK SURGERY      STOMACH SURGERY      VASECTOMY       Family History   Problem Relation Age of Onset    Heart disease Mother     Heart attack Mother     Heart disease Father     Diabetes Father     Hypertension Father     Colon cancer Neg Hx        Home Medications:  Prior to Admission medications    Medication Sig Start Date End Date Taking? Authorizing Provider   amLODIPine (NORVASC) 10 MG tablet  9/13/21   ProviderKatelyn MD   aspirin 81 MG EC tablet aspirin 81 mg oral tablet,delayed release (DR/EC) take 1 tablet (81 mg) by oral route once daily   Active    ProviderKatelyn MD   bisoprolol (ZEBeta) 5 MG  "tablet Take 1 tablet by mouth Daily. 7/31/23   Katelyn Salinas MD   cholecalciferol (VITAMIN D3) 25 MCG (1000 UT) tablet Vitamin D3 1,000 unit oral tablet take 1 tablet by oral route daily   Active    Katelyn Salinas MD   cyclobenzaprine (FLEXERIL) 10 MG tablet  9/2/21   Katelyn Salinas MD   docusate sodium (Colace) 100 MG capsule Take 1 capsule by mouth 2 (Two) Times a Day. 10/10/23   Kelly Jorge APRN   esomeprazole (nexIUM) 40 MG capsule Take 1 capsule by mouth Daily. 3/20/24   Katelyn Salinas MD   gabapentin (NEURONTIN) 800 MG tablet  9/7/21   Katelyn Salinas MD   glipizide (GLUCOTROL) 5 MG tablet glipizide 5 mg oral tablet take 1 tablet (5 mg) by oral route once daily before a meal   Active    Katelyn Salinas MD   glyburide (DIAbeta) 2.5 MG tablet     Katelyn Salinas MD   HYDROcodone-acetaminophen (NORCO)  MG per tablet Take 1 tablet every 4 hours by oral route as needed for 30 days. 9/19/23   Katelyn Salinas MD   levothyroxine (SYNTHROID, LEVOTHROID) 200 MCG tablet Take 1 tablet by mouth Daily. 7/31/23   Katelyn Salinas MD   lisinopril (PRINIVIL,ZESTRIL) 5 MG tablet Take 1 tablet by mouth Daily. 8/30/23   Katelyn Salinas MD   lubiprostone (Amitiza) 24 MCG capsule Take 1 capsule by mouth 2 (Two) Times a Day With Meals. 10/10/23   Kelly Jorge APRN   meclizine (ANTIVERT) 25 MG tablet     Katelyn Salinas MD   metaxalone (SKELAXIN) 800 MG tablet     Katelyn Salinas MD   metFORMIN (GLUCOPHAGE) 1000 MG tablet Take 1 tablet by mouth 2 (Two) Times a Day With Meals. 8/28/23   Katelyn Salinas MD   nabumetone (RELAFEN) 500 MG tablet  12/14/22   Katelyn Salinas MD   nebivolol (BYSTOLIC) 10 MG tablet Take 1 tablet by mouth Daily.    Katelyn Salinas MD   Needle, Disp, (B-D HYPODERMIC NEEDLE 18GX1.5\") 18G X 1-1/2\" misc USE FOR TESTOSTERONE INJECTIONS EVERY 2 WEEKS 9/15/23   Erika Diehl, APRN " "  polyethylene glycol (GoLYTELY) 236 g solution Take per office instructions 10/10/23   Kelly Jorge APRN   rosuvastatin (CRESTOR) 40 MG tablet Take 1 tablet by mouth Daily. 7/31/23   ProviderKatelyn MD   sildenafil (VIAGRA) 100 MG tablet Viagra 100 mg oral tablet take 1 tablet (100 mg) by oral route once daily as needed approximately 1 hour before sexual activity   Suspended    ProviderKatelyn MD   Syringe/Needle, Disp, (B-D 3CC LUER-REYES SYR 25GX1\") 25G X 1\" 3 ML misc USE WITH TESTOSTERONE 12/22/23   Erika Diehl APRN   tamsulosin (FLOMAX) 0.4 MG capsule 24 hr capsule TAKE 1 CAPSULE BY MOUTH EVERY NIGHT AT BEDTIME *TAKE WITH FOOD* STOP IF ADVERSE REACTION OF DIZZINESS DEVLOPS 4/2/24   Erika Diehl APRN   Testosterone Cypionate (DEPOTESTOTERONE CYPIONATE) 200 MG/ML injection INJECT 1 MILLILITER INTRAMUSCULARLY EVERY 14 DAYS 4/25/24   Erika Diehl APRN        Social History:   Social History     Tobacco Use    Smoking status: Every Day     Current packs/day: 1.00     Average packs/day: 1 pack/day for 35.0 years (35.0 ttl pk-yrs)     Types: Cigarettes     Passive exposure: Current    Smokeless tobacco: Former    Tobacco comments:     SMOKING 21-30 YEARS   Vaping Use    Vaping status: Never Used   Substance Use Topics    Alcohol use: Not Currently     Comment: OCCASIONALLY    Drug use: Never         Review of Systems:  Review of Systems   Constitutional:  Negative for chills and fever.   Gastrointestinal:  Positive for abdominal pain, anal bleeding (Hemorrhoids bleeding from straining so hard), constipation and nausea. Negative for diarrhea and vomiting.   Genitourinary:  Negative for flank pain.   Musculoskeletal:  Positive for back pain (Chronic back pain.  Is on opiates).   Neurological: Negative.    Hematological: Negative.    Psychiatric/Behavioral: Negative.     All other systems reviewed and are negative.       Physical Exam:  /87   Pulse 100   Temp 97.9 °F " "(36.6 °C) (Oral)   Resp 18   Ht 175.3 cm (69\")   Wt 114 kg (251 lb 12.3 oz)   SpO2 93%   BMI 37.18 kg/m²     Physical Exam  Vitals and nursing note reviewed.   Constitutional:       General: He is not in acute distress.     Appearance: Normal appearance. He is not toxic-appearing.   HENT:      Head: Normocephalic and atraumatic.      Mouth/Throat:      Mouth: Mucous membranes are moist.   Eyes:      General: No scleral icterus.     Conjunctiva/sclera: Conjunctivae normal.   Cardiovascular:      Rate and Rhythm: Normal rate and regular rhythm.      Heart sounds: Normal heart sounds.   Pulmonary:      Effort: Pulmonary effort is normal. No respiratory distress.      Breath sounds: Normal breath sounds.   Abdominal:      General: Bowel sounds are normal.      Palpations: Abdomen is soft.      Tenderness: There is abdominal tenderness (Diffuse). There is no right CVA tenderness or left CVA tenderness.      Comments: Protuberant abdomen   Musculoskeletal:         General: Normal range of motion.      Cervical back: Normal range of motion and neck supple.   Skin:     General: Skin is warm and dry.   Neurological:      Mental Status: He is alert and oriented to person, place, and time.   Psychiatric:         Mood and Affect: Mood normal.         Behavior: Behavior normal.              Medical Decision Making:      Comorbidities that affect care:    Coronary Artery Disease, Diabetes, Hypertension, Smoking, Thyroid Disease    External Notes reviewed:    Previous Clinic Note: Patient's last visit was with cardiology on April 26 for management of his hypertension coronary artery disease and status post stent placement      The following orders were placed and all results were independently analyzed by me:  Orders Placed This Encounter   Procedures    CT Abdomen Pelvis With Contrast    [place] Draw    Comprehensive Metabolic Panel    Lipase    Lactic Acid, Plasma    CBC Auto Differential    Scan Slide    Urinalysis With " Microscopic If Indicated (No Culture) - Urine, Clean Catch    Urinalysis, Microscopic Only - Urine, Clean Catch    Undress & Gown    CBC & Differential    Green Top (Gel)    Lavender Top    Gold Top - SST    Light Blue Top       Medications Given in the Emergency Department:  Medications   iopamidol (ISOVUE-370) 76 % injection 100 mL (100 mL Intravenous Given 4/30/24 0229)   Naloxegol Oxalate (MOVANTIK) tablet 25 mg (25 mg Oral Given 4/30/24 0326)   mineral oil enema 1 enema (1 enema Rectal Given 4/30/24 0533)   magnesium citrate solution 296 mL (296 mL Oral Given 4/30/24 0732)        ED Course:    ED Course as of 05/02/24 0627 Tue Apr 30, 2024   0450 + bm [DS]   0513 Patient states although he did have a bowel movement he did not feel like it was sufficient and like additional medications to have a sufficient BM before he leaves [DS]      ED Course User Index  [DS] Mary Batista, APRN       Labs:    Lab Results (last 24 hours)       ** No results found for the last 24 hours. **             Imaging:    No Radiology Exams Resulted Within Past 24 Hours      Differential Diagnosis and Discussion:    Abdominal Pain: Based on the patient's signs and symptoms, I considered abdominal aortic aneurysm, small bowel obstruction, pancreatitis, acute cholecystitis, acute appendecitis, peptic ulcer disease, gastritis, colitis, endocrine disorders, irritable bowel syndrome and other differential diagnosis an etiology of the patient's abdominal pain.    All labs were reviewed and interpreted by me.  CT scan radiology impression was interpreted by me.    MDM  Number of Diagnoses or Management Options  Drug-induced constipation  Leukocytosis, unspecified type  Diagnosis management comments: The patient is resting comfortably and feels better, is alert and in no distress. Repeat examination is unremarkable and benign; in particular, there's no discomfort at McBurney's point and there is no pulsatile mass. The history, exam,  diagnostic testing, and current condition does not suggest acute appendicitis, bowel obstruction, acute cholecystitis, bowel perforation, major gastrointestinal bleeding, severe diverticulitis, abdominal aortic aneurysm, mesenteric ischemia, volvulus, sepsis, or other significant pathology that warrants further testing, continued ED treatment, admission, for surgical evaluation at this point. The vital signs have been stable. The patient does not have uncontrollable pain, intractable vomiting, or other significant symptoms. The patient's condition is stable and appropriate for discharge from the emergency department.       Amount and/or Complexity of Data Reviewed  Clinical lab tests: reviewed and ordered  Tests in the radiology section of CPT®: reviewed and ordered  Tests in the medicine section of CPT®: ordered and reviewed    Risk of Complications, Morbidity, and/or Mortality  Presenting problems: moderate  Diagnostic procedures: moderate  Management options: low    Patient Progress  Patient progress: stable           Patient Care Considerations:    SEPSIS was considered but is NOT present in the emergency department as SIRS criteria is not present.      Consultants/Shared Management Plan:    None    Social Determinants of Health:    Patient is independent, reliable, and has access to care.       Disposition and Care Coordination:    Discharged: I considered escalation of care by admitting this patient to the hospital, however CT did not show any acute abnormality or signs of infection.  Patient was able to have a bowel movement in the ED after medication    I have explained the patient´s condition, diagnoses and treatment plan based on the information available to me at this time. I have answered questions and addressed any concerns. The patient has a good  understanding of the patient´s diagnosis, condition, and treatment plan as can be expected at this point. The vital signs have been stable. The patient´s  condition is stable and appropriate for discharge from the emergency department.      The patient will pursue further outpatient evaluation with the primary care physician or other designated or consulting physician as outlined in the discharge instructions. They are agreeable to this plan of care and follow-up instructions have been explained in detail. The patient has received these instructions in written format and has expressed an understanding of the discharge instructions. The patient is aware that any significant change in condition or worsening of symptoms should prompt an immediate return to this or the closest emergency department or call to 1.  I have explained discharge medications and the need for follow up with the patient/caretakers. This was also printed in the discharge instructions. Patient was discharged with the following medications and follow up:      Medication List        New Prescriptions      Naloxegol Oxalate 25 MG tablet  Commonly known as: MOVANTIK  Take 1 tablet by mouth Every Morning.               Where to Get Your Medications        These medications were sent to [facility] [id number]  [place], [place] - [address] AT [facility] [address] - [phone] [facility] [phone] [place] [address] [address], [facility] [address]      Phone: [phone]   Naloxegol Oxalate 25 MG tablet      Danny Mccartney MD  [address]  [place] [id number] [phone]    Schedule an appointment as soon as possible for a visit   For reevaluation of elevated white blood cells with no known cause and for drug-induced constipation       Final diagnoses:   Drug-induced constipation   Leukocytosis, unspecified type        ED Disposition       ED Disposition   Discharge    Condition   Stable    Comment   --               This medical record created using voice recognition software.             Mary Batista APRN  04/30/24 0509       Mary Batista APRN  05/02/24 0629

## 2024-05-24 DIAGNOSIS — E29.1 HYPOGONADISM IN MALE: ICD-10-CM

## 2024-05-24 RX ORDER — TESTOSTERONE CYPIONATE 200 MG/ML
INJECTION, SOLUTION INTRAMUSCULAR
Qty: 2 ML | Refills: 0 | Status: SHIPPED | OUTPATIENT
Start: 2024-05-24

## 2024-06-20 ENCOUNTER — TELEPHONE (OUTPATIENT)
Dept: UROLOGY | Facility: CLINIC | Age: 59
End: 2024-06-20

## 2024-06-20 DIAGNOSIS — E29.1 HYPOGONADISM IN MALE: ICD-10-CM

## 2024-06-20 RX ORDER — TESTOSTERONE CYPIONATE 200 MG/ML
INJECTION, SOLUTION INTRAMUSCULAR
Qty: 2 ML | Refills: 0 | Status: SHIPPED | OUTPATIENT
Start: 2024-06-20

## 2024-06-20 RX ORDER — SYRINGE WITH NEEDLE, 1 ML 25GX5/8"
SYRINGE, EMPTY DISPOSABLE MISCELLANEOUS
Qty: 6 EACH | Refills: 1 | Status: SHIPPED | OUTPATIENT
Start: 2024-06-20

## 2024-06-20 NOTE — TELEPHONE ENCOUNTER
Caller: Jeronimo Phillips    Relationship: Self    Best call back number: [phone]    Requested Prescriptions:   Requested Prescriptions      No prescriptions requested or ordered in this encounter     TESTOSTERONE    SYRINGE/NEEDLES 18G AND 25G    Pharmacy where request should be sent:   [address]  # [phone]     Last office visit with prescribing clinician: 4/8/2024   Last telemedicine visit with prescribing clinician: Visit date not found   Next office visit with prescribing clinician: 7/8/2024     Additional details provided by patient: PT HAS 1 DAY LEFT    Does the patient have less than a 3 day supply:  [x] Yes  [] No    Would you like a call back once the refill request has been completed: [] Yes [x] No    If the office needs to give you a call back, can they leave a voicemail: [] Yes [x] No    Julieta West Rep   06/20/24 10:59 EDT

## 2024-06-29 DIAGNOSIS — R39.11 BENIGN PROSTATIC HYPERPLASIA WITH URINARY HESITANCY: ICD-10-CM

## 2024-06-29 DIAGNOSIS — N40.1 BENIGN PROSTATIC HYPERPLASIA WITH URINARY HESITANCY: ICD-10-CM

## 2024-07-01 ENCOUNTER — LAB (OUTPATIENT)
Dept: LAB | Facility: HOSPITAL | Age: 59
End: 2024-07-01
Payer: COMMERCIAL

## 2024-07-01 DIAGNOSIS — E29.1 HYPOGONADISM IN MALE: ICD-10-CM

## 2024-07-01 LAB
ESTRADIOL SERPL HS-MCNC: 137 PG/ML
FSH SERPL-ACNC: <0.3 MIU/ML
LH SERPL-ACNC: <0.3 MIU/ML

## 2024-07-01 PROCEDURE — 83002 ASSAY OF GONADOTROPIN (LH): CPT

## 2024-07-01 PROCEDURE — 82672 ASSAY OF ESTROGEN: CPT

## 2024-07-01 PROCEDURE — 83001 ASSAY OF GONADOTROPIN (FSH): CPT

## 2024-07-01 PROCEDURE — 84403 ASSAY OF TOTAL TESTOSTERONE: CPT

## 2024-07-01 PROCEDURE — 82670 ASSAY OF TOTAL ESTRADIOL: CPT

## 2024-07-01 PROCEDURE — 36415 COLL VENOUS BLD VENIPUNCTURE: CPT

## 2024-07-01 PROCEDURE — 84402 ASSAY OF FREE TESTOSTERONE: CPT

## 2024-07-01 RX ORDER — TAMSULOSIN HYDROCHLORIDE 0.4 MG/1
CAPSULE ORAL
Qty: 90 CAPSULE | Refills: 3 | Status: SHIPPED | OUTPATIENT
Start: 2024-07-01 | End: 2024-09-29

## 2024-07-05 LAB
TESTOST FREE SERPL-MCNC: 8.5 PG/ML (ref 6.6–18.1)
TESTOST SERPL-MCNC: 624.7 NG/DL (ref 264–916)

## 2024-07-06 LAB — ESTROGEN SERPL-MCNC: 178 PG/ML (ref 56–213)

## 2024-07-08 ENCOUNTER — OFFICE VISIT (OUTPATIENT)
Dept: UROLOGY | Facility: CLINIC | Age: 59
End: 2024-07-08
Payer: COMMERCIAL

## 2024-07-08 VITALS
WEIGHT: 236.6 LBS | TEMPERATURE: 98.2 F | HEART RATE: 89 BPM | DIASTOLIC BLOOD PRESSURE: 55 MMHG | BODY MASS INDEX: 35.04 KG/M2 | SYSTOLIC BLOOD PRESSURE: 90 MMHG | HEIGHT: 69 IN

## 2024-07-08 DIAGNOSIS — Z51.81 MEDICATION MONITORING ENCOUNTER: ICD-10-CM

## 2024-07-08 DIAGNOSIS — N40.1 BENIGN PROSTATIC HYPERPLASIA WITH URINARY HESITANCY: ICD-10-CM

## 2024-07-08 DIAGNOSIS — R39.11 BENIGN PROSTATIC HYPERPLASIA WITH URINARY HESITANCY: ICD-10-CM

## 2024-07-08 DIAGNOSIS — E29.1 HYPOGONADISM IN MALE: Primary | ICD-10-CM

## 2024-07-08 DIAGNOSIS — Z86.39 HISTORY OF TYPE 2 DIABETES MELLITUS: ICD-10-CM

## 2024-07-08 DIAGNOSIS — Z87.19 HISTORY OF CHRONIC CONSTIPATION: ICD-10-CM

## 2024-07-08 LAB
BILIRUB BLD-MCNC: NEGATIVE MG/DL
CLARITY, POC: CLEAR
COLOR UR: YELLOW
EXPIRATION DATE: NORMAL
GLUCOSE UR STRIP-MCNC: NEGATIVE MG/DL
KETONES UR QL: NEGATIVE
LEUKOCYTE EST, POC: NEGATIVE
Lab: NORMAL
NITRITE UR-MCNC: NEGATIVE MG/ML
PH UR: 6 [PH] (ref 5–8)
PROT UR STRIP-MCNC: NEGATIVE MG/DL
RBC # UR STRIP: NEGATIVE /UL
SP GR UR: 1.02 (ref 1–1.03)
UROBILINOGEN UR QL: NORMAL

## 2024-07-08 PROCEDURE — 81003 URINALYSIS AUTO W/O SCOPE: CPT | Performed by: NURSE PRACTITIONER

## 2024-07-08 PROCEDURE — 99214 OFFICE O/P EST MOD 30 MIN: CPT | Performed by: NURSE PRACTITIONER

## 2024-07-08 RX ORDER — TIRZEPATIDE 7.5 MG/.5ML
INJECTION, SOLUTION SUBCUTANEOUS
COMMUNITY
Start: 2024-07-08

## 2024-07-08 RX ORDER — NITROGLYCERIN 0.4 MG/1
TABLET SUBLINGUAL
COMMUNITY
Start: 2024-05-10

## 2024-07-08 NOTE — PROGRESS NOTES
"Chief Complaint  Hypogonadism (3MO. F/U)  bph  Subjective          Jeronimo Phillips is a 59 y.o. male who presents to [facility] UROLOGY  History of Present Illness  Routine follow up of hypogonadism and bph. Patient on testosterone injections 200mg IM q 14 days and it helps maintain energy and mood most of the time. Chronic back and shoulder pain sometimes wakes him up during the night even with pain medication and at times less sleep hours. Most recent lab H&H within limits.  Most recent psa 1.35 9/3/2023.  Urine drug screen 7/2023 up to date.  Monitored and following per pain management. Patient is taking tamsulosin for bph and symptoms vary dependent on the day. Would not advise increasing tamsulosin to 2 as may decrease blood pressure. Problems with chronic constipation affecting voiding. Denies dysuria or gross hematuria.Patient states that he was having abdominal pain and evaluated. CT abd/pelvis indicated with no obstructive uropathy, Urinary bladder and prostate gland appear normal. There is a small fat-containing right inguinal hernia. No Evidence of colitis.Patient states had constipation and improved after enema's given. He was not aware that his WBC was elevated. Denied having any fever or signs of infection. Colonoscopy and endoscopy last year. Patient reports having lost approximately 30 lbs. Began recent  new medicatiom mounjaro         Objective   Vital Signs:   BP 90/55 (BP Location: Left arm, Patient Position: Sitting, Cuff Size: Large Adult)   Pulse 89   Temp 98.2 °F (36.8 °C) (Tympanic)   Ht 175.3 cm (69\")   Wt 107 kg (236 lb 9.6 oz)   BMI 34.94 kg/m²     Past Medical History:   Diagnosis Date    Acid reflux     Arthritis     Back pain     BPH (benign prostatic hyperplasia)     Cervical spinal stenosis 07/09/2015    C3-4 C4-5 WITH MYELOMALACIA C4-5 AND C5-6    Cervical strain     Chest pain     Clotting disorder 2022    Hemroids    Condition not found     MEATAL STENOSIS    " Coronary artery disease     Degeneration of lumbar intervertebral disc 03/12/2015    Degenerative cervical disc 11/17/2014    Diabetes 06/03/2021    ED (erectile dysfunction)     Elevated cholesterol     Encounter for medication monitoring 06/03/2021    Erectile dysfunction 2010    Headache     Heart attack     Heart disease     History of arthritis     HLD (hyperlipidemia)     HTN (hypertension)     Hyperlipemia     Hypogonadism male     Hypothyroid     Limb swelling     MI (myocardial infarction)     Neck pain     Other cervical disc degeneration, unspecified cervical region 07/02/2015    Seasonal allergies     Sleep apnea     SOB (shortness of breath)     Thyroid disease     Thyroid disorder     Urinary incontinence 2022     Past Surgical History:   Procedure Laterality Date    CARDIAC SURGERY      CERVICAL LAMINECTOMY  08/24/2015    C3-4 C4-5 C5-6    CHOLECYSTECTOMY      COLON RESECTION      COLONOSCOPY  2012    COLONOSCOPY N/A 10/26/2023    Procedure: COLONOSCOPY WITH POLYPECTOMIES;  Surgeon: Matteo Leavitt MD;  Location: [facility] ENDOSCOPY;  Service: Gastroenterology;  Laterality: N/A;  COLON POLYPS, HEMORRHOIDS    CORONARY ANGIOPLASTY WITH STENT PLACEMENT  2010    CYSTOSCOPY      ENDOSCOPY N/A 10/26/2023    Procedure: ESOPHAGOGASTRODUODENOSCOPY WITH BIOPSIES;  Surgeon: Matteo Leavitt MD;  Location: [facility] ENDOSCOPY;  Service: Gastroenterology;  Laterality: N/A;  NORMAL    GALLBLADDER SURGERY      HEMORRHOID BANDING      INGUINAL HERNIA REPAIR Left     KNEE SURGERY Right 01/15/2020    REPAIR    LUMBAR DISCECTOMY Right 10/14/2020    L3-4 DISECTOMY WITH LAMINECTOMY MINIMAL INVASIVE    NECK SURGERY      STOMACH SURGERY      VASECTOMY       Family History   Problem Relation Age of Onset    Heart disease Mother     Heart attack Mother     Heart disease Father     Diabetes Father     Hypertension Father     Colon cancer Neg Hx      Social History     Socioeconomic History    Marital status:      "Number of children: 2   Tobacco Use    Smoking status: Every Day     Current packs/day: 1.00     Average packs/day: 1 pack/day for 35.0 years (35.0 ttl pk-yrs)     Types: Cigarettes     Passive exposure: Current    Smokeless tobacco: Former    Tobacco comments:     SMOKING 21-30 YEARS   Vaping Use    Vaping status: Never Used   Substance and Sexual Activity    Alcohol use: Not Currently     Comment: OCCASIONALLY    Drug use: Never    Sexual activity: Not Currently     Partners: Female     Allergies   Allergen Reactions    Clopidogrel Hives and Other (See Comments)    Clopidogrel Bisulfate Unknown - High Severity    Sulfamethoxazole-Trimethoprim Hives and Other (See Comments)    Batroxobin Unknown - High Severity     Current Outpatient Medications   Medication Sig Dispense Refill    amLODIPine (NORVASC) 10 MG tablet       aspirin 81 MG EC tablet aspirin 81 mg oral tablet,delayed release (DR/EC) take 1 tablet (81 mg) by oral route once daily   Active      B-D 3CC LUER-REYES SYR 25GX1\" 25G X 1\" 3 ML misc USE WITH TESTOSTERONE 6 each 1    bisoprolol (ZEBeta) 5 MG tablet Take 1 tablet by mouth Daily.      cholecalciferol (VITAMIN D3) 25 MCG (1000 UT) tablet Vitamin D3 1,000 unit oral tablet take 1 tablet by oral route daily   Active      cyclobenzaprine (FLEXERIL) 10 MG tablet       docusate sodium (Colace) 100 MG capsule Take 1 capsule by mouth 2 (Two) Times a Day. 60 capsule 3    esomeprazole (nexIUM) 40 MG capsule Take 1 capsule by mouth Daily.      gabapentin (NEURONTIN) 800 MG tablet       glipizide (GLUCOTROL) 5 MG tablet glipizide 5 mg oral tablet take 1 tablet (5 mg) by oral route once daily before a meal   Active      glyburide (DIAbeta) 2.5 MG tablet       HYDROcodone-acetaminophen (NORCO)  MG per tablet Take 1 tablet every 4 hours by oral route as needed for 30 days.      levothyroxine (SYNTHROID, LEVOTHROID) 200 MCG tablet Take 1 tablet by mouth Daily.      lisinopril (PRINIVIL,ZESTRIL) 5 MG tablet Take 1 " "tablet by mouth Daily.      lubiprostone (Amitiza) 24 MCG capsule Take 1 capsule by mouth 2 (Two) Times a Day With Meals. 60 capsule 3    meclizine (ANTIVERT) 25 MG tablet       metaxalone (SKELAXIN) 800 MG tablet       metFORMIN (GLUCOPHAGE) 1000 MG tablet Take 1 tablet by mouth 2 (Two) Times a Day With Meals.      Mounjaro 7.5 MG/0.5ML solution pen-injector pen INJECT 7.5 MG UNDER THE SKIN ONCE WEEKLY      nabumetone (RELAFEN) 500 MG tablet       Naloxegol Oxalate (MOVANTIK) 25 MG tablet Take 1 tablet by mouth Every Morning. 30 tablet 0    nebivolol (BYSTOLIC) 10 MG tablet Take 1 tablet by mouth Daily.      Needle, Disp, (B-D HYPODERMIC NEEDLE 18GX1.5\") 18G X 1-1/2\" misc USE FOR TESTOSTERONE INJECTIONS EVERY 2 WEEKS 6 each 3    nitroglycerin (NITROSTAT) 0.4 MG SL tablet       polyethylene glycol (GoLYTELY) 236 g solution Take per office instructions 4000 mL 0    rosuvastatin (CRESTOR) 40 MG tablet Take 1 tablet by mouth Daily.      tamsulosin (FLOMAX) 0.4 MG capsule 24 hr capsule TAKE 1 CAPSULE BY MOUTH EVERY NIGHT AT BEDTIME *TAKE WITH FOOD* STOP IF ADVERSE REACTION OF DIZZINESS DEVELOPS 90 capsule 3    Testosterone Cypionate (DEPOTESTOTERONE CYPIONATE) 200 MG/ML injection INJECT 1ML INTRAMUSCULARLY EVERY 14 DAYS 2 mL 0     No current facility-administered medications for this visit.         Review of Systems   Constitutional:  Negative for chills and fever.   Genitourinary:  Positive for frequency. Negative for dysuria, hematuria and testicular pain.   Musculoskeletal:  Positive for arthralgias and back pain.        Physical Exam  Vitals and nursing note reviewed.   Constitutional:       General: He is not in acute distress.     Appearance: Normal appearance. He is well-developed. He is not ill-appearing.      Comments: Ambulates without difficulty   Cardiovascular:      Rate and Rhythm: Normal rate.      Heart sounds: Murmur (faint murmur) heard.   Pulmonary:      Effort: Pulmonary effort is normal.      Breath " sounds: Normal air entry.   Abdominal:      General: There is no distension.      Palpations: There is no mass.      Tenderness: There is no abdominal tenderness. There is no guarding or rebound.      Comments: Non tender with palpation. Hypoactive bowel sounds   Musculoskeletal:         General: Normal range of motion.   Skin:     General: Skin is warm and dry.   Neurological:      Mental Status: He is alert and oriented to person, place, and time.      Motor: Motor function is intact.   Psychiatric:         Mood and Affect: Mood normal.         Behavior: Behavior normal. Behavior is cooperative.         Thought Content: Thought content normal.         Judgment: Judgment normal.        Result Review :     CMP          7/29/2023    10:17 4/30/2024    00:11   CMP   Glucose 157  150    BUN 18  22    Creatinine 0.99  1.14    EGFR 87.2  73.6    Sodium 140  136    Potassium 4.0  4.2    Chloride 103  100    Calcium 9.5  9.9    Total Protein 6.9  7.8    Albumin 4.5  4.5    Globulin 2.4  3.3    Total Bilirubin 0.5  0.5    Alkaline Phosphatase 41  47    AST (SGOT) 14  19    ALT (SGPT) 21  21    Albumin/Globulin Ratio 1.9  1.4    BUN/Creatinine Ratio 18.2  19.3    Anion Gap 11.8  14.1      CBC          10/10/2023    12:39 3/20/2024    16:31 4/30/2024    00:11   CBC   WBC 9.18  10.42     19.55    RBC 5.18  5.38     5.69    Hemoglobin 12.6  11.7     12.4    Hematocrit 41.3  39.7     42.3    MCV 79.7  73.8     74.3    MCH 24.3  21.7     21.8    MCHC 30.5  29.5     29.3    RDW 15.8  19.0     21.2    Platelets 225  238     231       Details          This result is from an external source.             Lipid Panel          7/29/2023    10:17   Lipid Panel   Total Cholesterol 117    Triglycerides 179    HDL Cholesterol 38    VLDL Cholesterol 30    LDL Cholesterol  49    LDL/HDL Ratio 1.14      A1C Last 3 Results          7/29/2023    10:17 9/20/2023    15:04 3/20/2024    16:31   HGBA1C Last 3 Results   Hemoglobin A1C 9.60  9.4      9.7          Details          This result is from an external source.             PSA   Date Value Ref Range Status   09/20/2023 1.35 0.0 - 4.0 ng/mL Final     Comment:     (note)  Testing performed on the Claudia Pro. Should not be  interpreted as evidence for the presence  or absence of malignancy.   12/29/2022 1.39 0.0 - 4.0 ng/mL Final     Comment:     (note)  Testing performed on the Claudia Pro. Should not be  interpreted as evidence for the presence  or absence of malignancy.   01/05/2022 1.22 0.00 - 4.00 ng/mL Final     Comment:     (note)  Testing performed on the Claudia Pro. Should not be  interpreted as evidence for the presence  or absence of malignancy.     Lab Results   Component Value Date    ESTRADIOL 137.0 07/01/2024     Lab Results   Component Value Date    ESTROGEN 178 07/01/2024      Lab Results   Component Value Date    LH <0.30 07/01/2024      Lab Results   Component Value Date    FSH <0.30 07/01/2024      Lab Results   Component Value Date    PROLACTIN 8.41 07/29/2023      Lab Results   Component Value Date    TESTOSTEROTT 651 07/29/2023    TESTOSTEROTT 534 07/29/2023    BIOAVAILAB 402 (H) 07/29/2023    BIOAVAILAB 75.2 07/29/2023      Lab Results   Component Value Date    TESTOSTEROTT 651 07/29/2023    TESTOSTEROTT 534 07/29/2023    TESTFRE 8.5 07/01/2024    SEXMONB 16.1 (L) 07/29/2023         Results for orders placed or performed in visit on 07/08/24   POC Urinalysis Dipstick, Automated    Specimen: Urine   Result Value Ref Range    Color Yellow Yellow, Straw, Dark Yellow, Bessie    Clarity, UA Clear Clear    Specific Gravity  1.020 1.005 - 1.030    pH, Urine 6.0 5.0 - 8.0    Leukocytes Negative Negative    Nitrite, UA Negative Negative    Protein, POC Negative Negative mg/dL    Glucose, UA Negative Negative mg/dL    Ketones, UA Negative Negative    Urobilinogen, UA 0.2 E.U./dL Normal, 0.2 E.U./dL    Bilirubin Negative Negative    Blood, UA Negative Negative    Lot Number [id number]     Expiration  Date 02/28/25    CT Abdomen Pelvis With Contrast (04/30/2024 02:26)          Assessment and Plan    Diagnoses and all orders for this visit:    1. Hypogonadism in male (Primary)  -     POC Urinalysis Dipstick, Automated    2. Medication monitoring encounter    3. History of type 2 diabetes mellitus    4. Benign prostatic hyperplasia with urinary hesitancy    5. History of chronic constipation      Continue testosterone injection 200mg IM q 14 days. 2 vials 28 day supply. Vikas review last fill 6/20/2024. Tamsulosin 1 po nightly for bph. Discussed most recent labs.  and CT abd/pelvis. No stones or hydronephrosis. Prostate and bladder noted as appearing normal. Testosterone level therapeutic level and hct within range.  Follow up in 3 months for routine visit and will be due for psa level and dev and that time.     Patient voiding pattern varies and taking 2 tamsulosin not advised due to risk of lowering blood pressure further. Finasteride not desired per patient  possible sexual side effects. If patient develops worsening of bph, recommendation for cystoscopy to further evaluate and determine if surgical intervention of prostate may be appropriate.       Follow Up   Return in about 3 months (around 10/8/2024) for hypogonadism and bph.  Patient was given instructions and counseling regarding his condition or for health maintenance advice. Please see specific information pulled into the AVS if appropriate.     SVETLANA Roberto

## 2024-07-22 DIAGNOSIS — E29.1 HYPOGONADISM IN MALE: ICD-10-CM

## 2024-07-22 RX ORDER — TESTOSTERONE CYPIONATE 200 MG/ML
INJECTION, SOLUTION INTRAMUSCULAR
Qty: 2 ML | Refills: 0 | Status: SHIPPED | OUTPATIENT
Start: 2024-07-22

## 2024-07-22 NOTE — TELEPHONE ENCOUNTER
Caller: Jeronimo Phillips LUZ     Relationship: SELF    Best call back number: [phone]     Requested Prescriptions:   Requested Prescriptions     Pending Prescriptions Disp Refills    Testosterone Cypionate (DEPOTESTOTERONE CYPIONATE) 200 MG/ML injection [Pharmacy Med Name: TESTOSTERONE  MG/ML] 2 mL      Sig: INJECT 1ML INTRAMUSCULARLY EVERY 14 DAYS        Pharmacy where request should be sent: [facility] [id number] [facility] [place] - [address] [facility] [address] [facility] [phone] [facility] [phone]      Last office visit with prescribing clinician: 7/8/2024   Last telemedicine visit with prescribing clinician: Visit date not found   Next office visit with prescribing clinician: 10/15/2024     Additional details provided by patient: PT IS OUT AND DUE FOR AN INJECTION TODAY.    Does the patient have less than a 3 day supply:  [x] Yes  [] No    Would you like a call back once the refill request has been completed: [] Yes [x] No    If the office needs to give you a call back, can they leave a voicemail: [] Yes [x] No    Julieta Pederson Rep   07/22/24 11:12 EDT

## 2024-08-01 ENCOUNTER — CLINICAL SUPPORT (OUTPATIENT)
Dept: FAMILY MEDICINE CLINIC | Facility: CLINIC | Age: 59
End: 2024-08-01

## 2024-08-01 DIAGNOSIS — Z77.098 CHEMICAL EXPOSURE: Primary | ICD-10-CM

## 2024-08-09 ENCOUNTER — TELEPHONE (OUTPATIENT)
Dept: GASTROENTEROLOGY | Facility: CLINIC | Age: 59
End: 2024-08-09
Payer: COMMERCIAL

## 2024-08-09 NOTE — TELEPHONE ENCOUNTER
"Called pt. Pt just noticed this issue. He states he is concerned about having some kind of infection. He just got back from [place] of town [place] He is c/o pain around concerning area and discolored to black. He again mentioned a \"terrible odor.\" Pt was hoping to be seen today. I advised that our first available was in October, but if he is concerned, he should report to his PCP, an Urgent Care facility or the ER. Pt will report to ER.   "

## 2024-08-09 NOTE — TELEPHONE ENCOUNTER
Hub staff attempted to follow warm transfer process and was unsuccessful     Caller: Jeronimo Phillips    Relationship to patient: Self    Best call back number: [phone]    Patient is needing: PATIENT IS COMPLAING OF SOMETHING BLACK ATTACHED TO HIS RECTUM, STATES IS HAS AN AWFUL ODOR AND IS VERY SORE TO TOUCH. PATIENT IS VERY CONCERNED PLEASE CALL HIM BACK

## 2024-08-19 DIAGNOSIS — E29.1 HYPOGONADISM IN MALE: ICD-10-CM

## 2024-08-19 RX ORDER — TESTOSTERONE CYPIONATE 200 MG/ML
INJECTION, SOLUTION INTRAMUSCULAR
Qty: 2 ML | Refills: 0 | Status: SHIPPED | OUTPATIENT
Start: 2024-08-19

## 2024-09-19 DIAGNOSIS — E29.1 HYPOGONADISM IN MALE: ICD-10-CM

## 2024-09-19 RX ORDER — TESTOSTERONE CYPIONATE 200 MG/ML
INJECTION, SOLUTION INTRAMUSCULAR
Qty: 2 ML | Refills: 0 | Status: SHIPPED | OUTPATIENT
Start: 2024-09-19

## 2024-10-15 ENCOUNTER — OFFICE VISIT (OUTPATIENT)
Dept: UROLOGY | Facility: CLINIC | Age: 59
End: 2024-10-15
Payer: COMMERCIAL

## 2024-10-15 VITALS
BODY MASS INDEX: 32.58 KG/M2 | HEART RATE: 79 BPM | WEIGHT: 220 LBS | DIASTOLIC BLOOD PRESSURE: 76 MMHG | TEMPERATURE: 97.8 F | SYSTOLIC BLOOD PRESSURE: 116 MMHG | HEIGHT: 69 IN

## 2024-10-15 DIAGNOSIS — Z87.19 HISTORY OF CHRONIC CONSTIPATION: ICD-10-CM

## 2024-10-15 DIAGNOSIS — N40.1 BENIGN PROSTATIC HYPERPLASIA WITH URINARY HESITANCY: ICD-10-CM

## 2024-10-15 DIAGNOSIS — Z86.39 HISTORY OF TYPE 2 DIABETES MELLITUS: ICD-10-CM

## 2024-10-15 DIAGNOSIS — E29.1 HYPOGONADISM IN MALE: Primary | ICD-10-CM

## 2024-10-15 DIAGNOSIS — N52.1 ERECTILE DYSFUNCTION DUE TO DISEASES CLASSIFIED ELSEWHERE: ICD-10-CM

## 2024-10-15 DIAGNOSIS — R39.11 BENIGN PROSTATIC HYPERPLASIA WITH URINARY HESITANCY: ICD-10-CM

## 2024-10-15 LAB
BILIRUB BLD-MCNC: NEGATIVE MG/DL
CLARITY, POC: CLEAR
COLOR UR: YELLOW
EXPIRATION DATE: NORMAL
GLUCOSE UR STRIP-MCNC: NEGATIVE MG/DL
KETONES UR QL: NEGATIVE
LEUKOCYTE EST, POC: NEGATIVE
Lab: NORMAL
NITRITE UR-MCNC: NEGATIVE MG/ML
PH UR: 6.5 [PH] (ref 5–8)
PROT UR STRIP-MCNC: NEGATIVE MG/DL
RBC # UR STRIP: NEGATIVE /UL
SP GR UR: 1.01 (ref 1–1.03)
UROBILINOGEN UR QL: NORMAL

## 2024-10-15 RX ORDER — TESTOSTERONE CYPIONATE 200 MG/ML
INJECTION, SOLUTION INTRAMUSCULAR
Qty: 2 ML | Refills: 0 | Status: SHIPPED | OUTPATIENT
Start: 2024-10-15

## 2024-10-15 RX ORDER — ASPIRIN 81 MG/1
1 TABLET ORAL DAILY
COMMUNITY

## 2024-10-15 RX ORDER — OXYCODONE AND ACETAMINOPHEN 7.5; 325 MG/1; MG/1
TABLET ORAL
COMMUNITY
Start: 2024-10-13

## 2024-10-15 RX ORDER — ISOSORBIDE MONONITRATE 30 MG/1
TABLET, EXTENDED RELEASE ORAL
COMMUNITY
Start: 2024-09-04

## 2024-10-15 RX ORDER — TAMSULOSIN HYDROCHLORIDE 0.4 MG/1
1 CAPSULE ORAL EVERY EVENING
Qty: 90 CAPSULE | Refills: 3 | Status: SHIPPED | OUTPATIENT
Start: 2024-10-15 | End: 2025-10-10

## 2024-10-15 RX ORDER — ESOMEPRAZOLE MAGNESIUM 40 MG/1
1 CAPSULE, DELAYED RELEASE ORAL DAILY
COMMUNITY

## 2024-10-15 RX ORDER — ROSUVASTATIN CALCIUM 40 MG/1
40 TABLET, COATED ORAL DAILY
COMMUNITY
Start: 2024-08-19

## 2024-10-15 RX ORDER — TIRZEPATIDE 10 MG/.5ML
10 INJECTION, SOLUTION SUBCUTANEOUS
COMMUNITY
Start: 2024-08-27

## 2024-10-15 NOTE — PROGRESS NOTES
"Chief Complaint  Hypogonadism (3m f/u)    Subjective          Jeronimo Phillips is a 59 y.o. male who presents to [facility] UROLOGY  History of Present Illness  Routine follow up of hypogonadism and bph. Patient on testosterone injections 200mg IM q 14 days and it helps maintain energy and mood most of the time. Still feels medication beneficial and notices difference when missed dose of medication. Chronic back and shoulder pain sometimes wakes him up during the night even with pain medication and at times less sleep hours.  Last psa level 1.35 9/3/2023.  Urine drug screen 7/2023 and monitoring per pain management. Patient is taking tamsulosin for bph and symptoms vary dependent on the day. Denies dysuria or gross hematuria.        Objective   Vital Signs:   /76 (BP Location: Right arm, Patient Position: Sitting, Cuff Size: Adult)   Pulse 79   Temp 97.8 °F (36.6 °C) (Temporal)   Ht 175.3 cm (69.02\")   Wt 99.8 kg (220 lb)   BMI 32.47 kg/m²     Past Medical History:   Diagnosis Date    Acid reflux     Arthritis     Back pain     BPH (benign prostatic hyperplasia)     Cervical spinal stenosis 07/09/2015    C3-4 C4-5 WITH MYELOMALACIA C4-5 AND C5-6    Cervical strain     Chest pain     Clotting disorder 2022    Hemroids    Condition not found     MEATAL STENOSIS    Coronary artery disease     Degeneration of lumbar intervertebral disc 03/12/2015    Degenerative cervical disc 11/17/2014    Diabetes 06/03/2021    ED (erectile dysfunction)     Elevated cholesterol     Encounter for medication monitoring 06/03/2021    Erectile dysfunction 2010    Headache     Heart attack     Heart disease     History of arthritis     HLD (hyperlipidemia)     HTN (hypertension)     Hyperlipemia     Hypogonadism male     Hypothyroid     Limb swelling     MI (myocardial infarction)     Neck pain     Other cervical disc degeneration, unspecified cervical region 07/02/2015    Seasonal allergies     Sleep apnea     " SOB (shortness of breath)     Thyroid disease     Thyroid disorder     Urinary incontinence 2022     Past Surgical History:   Procedure Laterality Date    CARDIAC SURGERY      CERVICAL LAMINECTOMY  08/24/2015    C3-4 C4-5 C5-6    CHOLECYSTECTOMY      COLON RESECTION      COLONOSCOPY  2012    COLONOSCOPY N/A 10/26/2023    Procedure: COLONOSCOPY WITH POLYPECTOMIES;  Surgeon: Matteo Leavitt MD;  Location: [facility] ENDOSCOPY;  Service: Gastroenterology;  Laterality: N/A;  COLON POLYPS, HEMORRHOIDS    CORONARY ANGIOPLASTY WITH STENT PLACEMENT  2010    CYSTOSCOPY      ENDOSCOPY N/A 10/26/2023    Procedure: ESOPHAGOGASTRODUODENOSCOPY WITH BIOPSIES;  Surgeon: Matteo Leavitt MD;  Location: [facility] ENDOSCOPY;  Service: Gastroenterology;  Laterality: N/A;  NORMAL    GALLBLADDER SURGERY      HEMORRHOID BANDING      INGUINAL HERNIA REPAIR Left     KNEE SURGERY Right 01/15/2020    REPAIR    LUMBAR DISCECTOMY Right 10/14/2020    L3-4 DISECTOMY WITH LAMINECTOMY MINIMAL INVASIVE    NECK SURGERY      STOMACH SURGERY      VASECTOMY       Family History   Problem Relation Age of Onset    Heart disease Mother     Heart attack Mother     Heart disease Father     Diabetes Father     Hypertension Father     Colon cancer Neg Hx      Social History     Socioeconomic History    Marital status:     Number of children: 2   Tobacco Use    Smoking status: Every Day     Current packs/day: 1.00     Average packs/day: 1 pack/day for 35.0 years (35.0 ttl pk-yrs)     Types: Cigarettes     Passive exposure: Current    Smokeless tobacco: Former    Tobacco comments:     SMOKING 21-30 YEARS   Vaping Use    Vaping status: Never Used   Substance and Sexual Activity    Alcohol use: Not Currently     Comment: OCCASIONALLY    Drug use: Never    Sexual activity: Not Currently     Partners: Female     Allergies   Allergen Reactions    Clopidogrel Hives and Other (See Comments)    Clopidogrel Bisulfate Unknown - High Severity     "Sulfamethoxazole-Trimethoprim Hives and Other (See Comments)    Batroxobin Unknown - High Severity     Current Outpatient Medications   Medication Sig Dispense Refill    amLODIPine (NORVASC) 10 MG tablet       aspirin 81 MG EC tablet Take 1 tablet by mouth Daily.      B-D 3CC LUER-REYES SYR 25GX1\" 25G X 1\" 3 ML misc USE WITH TESTOSTERONE 6 each 1    bisoprolol (ZEBeta) 5 MG tablet Take 1 tablet by mouth Daily.      cholecalciferol (VITAMIN D3) 25 MCG (1000 UT) tablet Vitamin D3 1,000 unit oral tablet take 1 tablet by oral route daily   Active      cyclobenzaprine (FLEXERIL) 10 MG tablet       esomeprazole (nexIUM) 40 MG capsule Take 1 capsule by mouth Daily.      esomeprazole (nexIUM) 40 MG capsule Take 1 capsule by mouth Daily.      gabapentin (NEURONTIN) 800 MG tablet       glipizide (GLUCOTROL) 5 MG tablet glipizide 5 mg oral tablet take 1 tablet (5 mg) by oral route once daily before a meal   Active      isosorbide mononitrate (IMDUR) 30 MG 24 hr tablet       levothyroxine (SYNTHROID, LEVOTHROID) 200 MCG tablet Take 1 tablet by mouth Daily.      lisinopril (PRINIVIL,ZESTRIL) 5 MG tablet Take 1 tablet by mouth Daily.      metFORMIN (GLUCOPHAGE) 1000 MG tablet Take 1 tablet by mouth 2 (Two) Times a Day With Meals.      Mounjaro 10 MG/0.5ML solution pen-injector pen Inject 0.5 mL under the skin into the appropriate area as directed.      Naloxegol Oxalate (MOVANTIK) 25 MG tablet Take 1 tablet by mouth Every Morning. 30 tablet 0    nebivolol (BYSTOLIC) 10 MG tablet Take 1 tablet by mouth Daily.      Needle, Disp, (B-D HYPODERMIC NEEDLE 18GX1.5\") 18G X 1-1/2\" misc USE FOR TESTOSTERONE INJECTIONS EVERY 2 WEEKS 6 each 3    nitroglycerin (NITROSTAT) 0.4 MG SL tablet       oxyCODONE-acetaminophen (PERCOCET) 7.5-325 MG per tablet       rosuvastatin (CRESTOR) 40 MG tablet Take 1 tablet by mouth Daily.      tamsulosin (FLOMAX) 0.4 MG capsule 24 hr capsule Take 1 capsule by mouth Every Evening for 360 days. 30 minutes after " evening meal 90 capsule 3    Testosterone Cypionate (DEPOTESTOTERONE CYPIONATE) 200 MG/ML injection INJECT 1 ML INTO THE MUSCLE EVERY 14 DAYS 2 mL 0     No current facility-administered medications for this visit.         Review of Systems   Constitutional:  Negative for chills and fever.   Genitourinary:  Negative for dysuria and hematuria.        Physical Exam  Vitals and nursing note reviewed.   Constitutional:       General: He is not in acute distress.     Appearance: Normal appearance. He is well-developed. He is not ill-appearing.      Comments: Ambulates without difficulty   Cardiovascular:      Rate and Rhythm: Normal rate and regular rhythm.   Pulmonary:      Effort: Pulmonary effort is normal.      Breath sounds: Normal air entry.   Abdominal:      General: There is no distension.      Tenderness: There is no abdominal tenderness. There is no guarding or rebound.   Musculoskeletal:         General: Normal range of motion.   Skin:     General: Skin is warm and dry.   Neurological:      Mental Status: He is alert and oriented to person, place, and time.      Motor: Motor function is intact.   Psychiatric:         Mood and Affect: Mood normal.         Behavior: Behavior normal. Behavior is cooperative.         Thought Content: Thought content normal.         Judgment: Judgment normal.        Result Review :     CMP          4/30/2024    00:11   CMP   Glucose 150    BUN 22    Creatinine 1.14    EGFR 73.6    Sodium 136    Potassium 4.2    Chloride 100    Calcium 9.9    Total Protein 7.8    Albumin 4.5    Globulin 3.3    Total Bilirubin 0.5    Alkaline Phosphatase 47    AST (SGOT) 19    ALT (SGPT) 21    Albumin/Globulin Ratio 1.4    BUN/Creatinine Ratio 19.3    Anion Gap 14.1      CBC          3/20/2024    16:31 4/30/2024    00:11   CBC   WBC 10.42     19.55    RBC 5.38     5.69    Hemoglobin 11.7     12.4    Hematocrit 39.7     42.3    MCV 73.8     74.3    MCH 21.7     21.8    MCHC 29.5     29.3    RDW 19.0      21.2    Platelets 238     231       Details          This result is from an external source.               A1C Last 3 Results          3/20/2024    16:31   HGBA1C Last 3 Results   Hemoglobin A1C 9.7          Details          This result is from an external source.             PSA   Date Value Ref Range Status   09/20/2023 1.35 0.0 - 4.0 ng/mL Final     Comment:     (note)  Testing performed on the Claudia Pro. Should not be  interpreted as evidence for the presence  or absence of malignancy.   12/29/2022 1.39 0.0 - 4.0 ng/mL Final     Comment:     (note)  Testing performed on the Claudia Pro. Should not be  interpreted as evidence for the presence  or absence of malignancy.   01/05/2022 1.22 0.00 - 4.00 ng/mL Final     Comment:     (note)  Testing performed on the Claudia Pro. Should not be  interpreted as evidence for the presence  or absence of malignancy.     Lab Results   Component Value Date    ESTRADIOL 137.0 07/01/2024     Lab Results   Component Value Date    ESTROGEN 178 07/01/2024      Lab Results   Component Value Date    LH <0.30 07/01/2024      Lab Results   Component Value Date    FSH <0.30 07/01/2024      Lab Results   Component Value Date    PROLACTIN 8.41 07/29/2023      Lab Results   Component Value Date    TESTOSTEROTT 651 07/29/2023    TESTOSTEROTT 534 07/29/2023    BIOAVAILAB 402 (H) 07/29/2023    BIOAVAILAB 75.2 07/29/2023      Lab Results   Component Value Date    TESTOSTEROTT 651 07/29/2023    TESTOSTEROTT 534 07/29/2023    TESTFRE 8.5 07/01/2024    SEXMONB 16.1 (L) 07/29/2023         Results for orders placed or performed in visit on 10/15/24   POC Urinalysis Dipstick, Automated    Collection Time: 10/15/24  4:21 PM    Specimen: Urine   Result Value Ref Range    Color Yellow Yellow, Straw, Dark Yellow, Bessie    Clarity, UA Clear Clear    Specific Gravity  1.010 1.005 - 1.030    pH, Urine 6.5 5.0 - 8.0    Leukocytes Negative Negative    Nitrite, UA Negative Negative    Protein, POC Negative  Negative mg/dL    Glucose, UA Negative Negative mg/dL    Ketones, UA Negative Negative    Urobilinogen, UA 0.2 E.U./dL Normal, 0.2 E.U./dL    Bilirubin Negative Negative    Blood, UA Negative Negative    Lot Number [id number]     Expiration Date 07/31/25    ED with Sreekanth Dacosta DO; Idris Rivera MD (04/30/2024)     Office Visit Converted with Erika Diehl APRN (01/10/2018)       Testosterone (Free & Total), LC / MS  Order: [id number]  Status: Final result       Visible to patient: Yes (seen)       Next appt: None       Dx: Hypogonadism in male    Specimen Information: Arm, Left; Blood   0 Result Notes              Component  Ref Range & Units 3 mo ago  (7/1/24) 1 yr ago  (7/29/23) 1 yr ago  (7/29/23) 2 yr ago  (5/23/22) 2 yr ago  (5/23/22) 3 yr ago  (3/6/21) 4 yr ago  (7/23/20)   Testosterone, Total  264.0 - 916.0 ng/dL 624.7 651 R,  R,  R,  R,  R,  R, CM   Comment: This [facility] LC/MS-MS method is currently certified by the CDC  Hormone Standardization Program (HoSt). Adult male reference  interval is based on a population of healthy nonobese males  (BMI <30) between 19 and 39 years old. Gabe, et.al. JCEM  2017,102;8038-2692. PMID: 04681587.   Testosterone, Free  6.6 - 18.1 pg/mL 8.5 11.6   9.7 R          Assessment and Plan    Diagnoses and all orders for this visit:    1. Hypogonadism in male (Primary)  -     POC Urinalysis Dipstick, Automated  -     Testosterone Cypionate (DEPOTESTOTERONE CYPIONATE) 200 MG/ML injection; INJECT 1 ML INTO THE MUSCLE EVERY 14 DAYS  Dispense: 2 mL; Refill: 0  -     Ambulatory Referral to Endocrinology    2. History of type 2 diabetes mellitus    3. History of chronic constipation    4. Benign prostatic hyperplasia with urinary hesitancy  -     tamsulosin (FLOMAX) 0.4 MG capsule 24 hr capsule; Take 1 capsule by mouth Every Evening for 360 days. 30 minutes after evening meal  Dispense: 90 capsule; Refill: 3    5. Erectile dysfunction due to  diseases classified elsewhere    Continue testosterone injections 200mg IM every 14 days. 2 vials for 28 day supply. Vikas reviewed and last fill  9/19/2024.  Patient therapeutic levels on same dosing since prior to 2018. Maintained home self injections from prior to protocol changes when practice under [facility]. Wishes to continue home self injections.  Discussed with patient continuation of care for hypogonadism and agree with referral to Endocrinologist Dr Rakesh Rockwell since only local provider available and patient wishes to remain in this area for treatment. This office location will close end of December and currently no local [facility] providers treating hypogonadism. [facility] [address] No longer treats hypogonadism. Could place transfer of care in December if patient wishes to continue following with Urologist. Patient is not having anysignificant problems with psa and or prostate taking tamsulosin for minor symptoms of bph. Patient plans discussing with pcp for monitoring of annual psa levels and tamsulosin and may consider defer Urology unless needed future.  Will let me know in December. May collect psa level with next lab draw with pcp or when in for visit December.    No longer prescribed sildenafil since order for prn sl nitro PDE5I not effective. Trial of PGE in the past. Records prior to 2018 not accessible per EPIC. No current access to [employer] system for prior records. Will need to contact medical records for integration of those records from [employer]        Follow Up   Return in about 2 months (around 12/15/2024) for recheck bph and transfer of care if needed. referral placed dr Chan for hypogonadism.  Patient was given instructions and counseling regarding his condition or for health maintenance advice. Please see specific information pulled into the AVS if appropriate.     Erika Diehl, SVETLANA

## 2024-11-13 DIAGNOSIS — E29.1 HYPOGONADISM IN MALE: ICD-10-CM

## 2024-11-14 ENCOUNTER — TELEPHONE (OUTPATIENT)
Dept: UROLOGY | Facility: CLINIC | Age: 59
End: 2024-11-14

## 2024-11-14 NOTE — TELEPHONE ENCOUNTER
Pt called stated he had requested  refill of testosterone, pt called to check if pharmacy had sent over request for authorization from provider to have prescription refilled.

## 2024-11-15 RX ORDER — NEEDLES, DISPOSABLE 25GX5/8"
NEEDLE, DISPOSABLE MISCELLANEOUS
Qty: 6 EACH | Refills: 3 | Status: SHIPPED | OUTPATIENT
Start: 2024-11-15

## 2024-11-15 RX ORDER — TESTOSTERONE CYPIONATE 200 MG/ML
INJECTION, SOLUTION INTRAMUSCULAR
Qty: 2 ML | Refills: 0 | Status: SHIPPED | OUTPATIENT
Start: 2024-11-15

## 2024-11-15 RX ORDER — SYRINGE WITH NEEDLE, 1 ML 25GX5/8"
SYRINGE, EMPTY DISPOSABLE MISCELLANEOUS
Qty: 6 EACH | Refills: 1 | Status: SHIPPED | OUTPATIENT
Start: 2024-11-15

## 2024-11-26 ENCOUNTER — TRANSCRIBE ORDERS (OUTPATIENT)
Dept: ADMINISTRATIVE | Facility: HOSPITAL | Age: 59
End: 2024-11-26
Payer: COMMERCIAL

## 2024-11-26 DIAGNOSIS — K90.9 MALABSORPTION OF IRON: ICD-10-CM

## 2024-11-26 DIAGNOSIS — D50.8 IRON DEFICIENCY ANEMIA SECONDARY TO INADEQUATE DIETARY IRON INTAKE: Primary | ICD-10-CM

## 2024-12-09 DIAGNOSIS — E29.1 HYPOGONADISM IN MALE: ICD-10-CM

## 2024-12-11 RX ORDER — TESTOSTERONE CYPIONATE 200 MG/ML
INJECTION, SOLUTION INTRAMUSCULAR
Qty: 2 ML | Refills: 0 | Status: SHIPPED | OUTPATIENT
Start: 2024-12-11

## 2024-12-17 ENCOUNTER — TRANSCRIBE ORDERS (OUTPATIENT)
Dept: ADMINISTRATIVE | Facility: HOSPITAL | Age: 59
End: 2024-12-17
Payer: COMMERCIAL

## 2024-12-17 DIAGNOSIS — K90.9 MALABSORPTION OF IRON: ICD-10-CM

## 2024-12-17 DIAGNOSIS — D50.9 IRON DEFICIENCY ANEMIA, UNSPECIFIED IRON DEFICIENCY ANEMIA TYPE: Primary | ICD-10-CM

## 2024-12-19 ENCOUNTER — TELEPHONE (OUTPATIENT)
Dept: UROLOGY | Facility: CLINIC | Age: 59
End: 2024-12-19

## 2024-12-19 DIAGNOSIS — N52.1 ERECTILE DYSFUNCTION DUE TO DISEASES CLASSIFIED ELSEWHERE: ICD-10-CM

## 2024-12-19 DIAGNOSIS — N40.1 BENIGN PROSTATIC HYPERPLASIA WITH URINARY HESITANCY: Primary | ICD-10-CM

## 2024-12-19 DIAGNOSIS — R39.11 BENIGN PROSTATIC HYPERPLASIA WITH URINARY HESITANCY: Primary | ICD-10-CM

## 2024-12-19 NOTE — TELEPHONE ENCOUNTER
"Caller: Jeronimo Phillips \"MAGGIE\"     Relationship:  SELF    Best call back number: [phone]     PATIENT CALLED REQUESTING TO CANCEL SAME DAY APPT.    Did the patient call AFTER the start time of their scheduled appointment?  []YES  [x]NO    Was the patient's appointment rescheduled? []YES  [x]NO    Any additional information: PT HAS A CONFLICT AND CANCELLED HIS APPT TODAY.    PT WAS REFERRED TO ENDOCRINOLOGY BUT PT HAS NOT HEARD FROM THEM.    PT STATES HE WOULD LIKE TO BE REFERRED TO UROLOGY THAT WOULD TAKE CARE BOTH HIS PROSTATE CARE AS WELL AS TESTOSTERONE EVEN IF THAT MEANS HE SEES A PROVIDER IN [place]. (PT'S PCP IS IN [place]. )     PLEASE REVIEW AND GIVE PT A CALL BACK.   "

## 2025-06-16 ENCOUNTER — TRANSCRIBE ORDERS (OUTPATIENT)
Dept: ADMINISTRATIVE | Facility: HOSPITAL | Age: 60
End: 2025-06-16
Payer: COMMERCIAL

## 2025-06-16 DIAGNOSIS — M54.41 ACUTE BACK PAIN WITH SCIATICA, RIGHT: Primary | ICD-10-CM

## 2025-06-20 ENCOUNTER — OFFICE VISIT (OUTPATIENT)
Dept: UROLOGY | Age: 60
End: 2025-06-20
Payer: COMMERCIAL

## 2025-06-20 ENCOUNTER — LAB (OUTPATIENT)
Facility: HOSPITAL | Age: 60
End: 2025-06-20
Payer: COMMERCIAL

## 2025-06-20 VITALS — HEIGHT: 69 IN | RESPIRATION RATE: 14 BRPM | WEIGHT: 220.02 LBS | BODY MASS INDEX: 32.59 KG/M2

## 2025-06-20 DIAGNOSIS — R39.11 BENIGN PROSTATIC HYPERPLASIA WITH URINARY HESITANCY: Primary | ICD-10-CM

## 2025-06-20 DIAGNOSIS — E29.1 HYPOGONADISM IN MALE: ICD-10-CM

## 2025-06-20 DIAGNOSIS — N40.1 BENIGN PROSTATIC HYPERPLASIA WITH URINARY HESITANCY: Primary | ICD-10-CM

## 2025-06-20 DIAGNOSIS — R39.11 BENIGN PROSTATIC HYPERPLASIA WITH URINARY HESITANCY: ICD-10-CM

## 2025-06-20 DIAGNOSIS — N40.1 BENIGN PROSTATIC HYPERPLASIA WITH URINARY HESITANCY: ICD-10-CM

## 2025-06-20 LAB
PSA SERPL-MCNC: 2.01 NG/ML (ref 0–4)
URINE VOLUME: 0

## 2025-06-20 PROCEDURE — 84153 ASSAY OF PSA TOTAL: CPT

## 2025-06-20 PROCEDURE — 36415 COLL VENOUS BLD VENIPUNCTURE: CPT

## 2025-06-20 RX ORDER — NABUMETONE 500 MG/1
500 TABLET, FILM COATED ORAL DAILY
COMMUNITY

## 2025-06-20 RX ORDER — TIRZEPATIDE 12.5 MG/.5ML
INJECTION, SOLUTION SUBCUTANEOUS
COMMUNITY
Start: 2025-04-28

## 2025-06-20 RX ORDER — TAMSULOSIN HYDROCHLORIDE 0.4 MG/1
2 CAPSULE ORAL EVERY EVENING
Qty: 180 CAPSULE | Refills: 4 | Status: SHIPPED | OUTPATIENT
Start: 2025-06-20

## 2025-06-20 RX ORDER — AMLODIPINE BESYLATE 5 MG/1
5 TABLET ORAL DAILY
COMMUNITY
Start: 2024-11-13

## 2025-06-20 NOTE — PROGRESS NOTES
Chief Complaint: Benign Prostatic Hypertrophy    Subjective         History of Present Illness  Jeronimo Phillips is a 60 y.o. male presents to [facility] to be seen for BPH/ED.    Patient was previously seen by SVETLANA Cobian with last visit on 10/15/2024 to follow-up on hypogonadism and BPH.  He was on testosterone injections at that visit.  He was also taking tamsulosin for BPH.  He is here for follow-up.    History of Present Illness  The patient is a 60-year-old male here for follow-up of benign prostatic hyperplasia (BPH).    He reports that his current medication, tamsulosin, does seem to be effective. He takes this twice daily.     He does not experience urinary frequency, urgency, or incontinence.     He also does not have nocturia.     His urinary stream is satisfactory when his bladder is full, but it becomes weaker with a sense of urgency.     He has no history of hematuria or renal calculi.     He has not undergone any surgical procedures on his bladder, kidneys, or prostate.     There is no family history of bladder, kidney, or prostate cancers.     His PSA was last checked in 01/2025, with a result of 1.38, which is stable.     He is currently on testosterone replacement therapy, managed by an endocrinologist.         FAMILY HISTORY  He reports no family history of bladder, kidney, or prostate cancers.            Cardiopulmonary-CAD, MI,  DM    Anticoagulants-ASA 81 mg    Smoker-    PSA  1/24/2025 1.38  9/20/2023 1.35  12/29/2022 1.39  1/5/2022 1.22  3/6/2021 1.10  12/27/2019 1.29  1/22/2019 1.10        Previous 10/16/2024:  Routine follow up of hypogonadism and bph. Patient on testosterone injections 200mg IM q 14 days and it helps maintain energy and mood most of the time. Still feels medication beneficial and notices difference when missed dose of medication. Chronic back and shoulder pain sometimes wakes him up during the night even with pain medication and at times  less sleep hours.  Last psa level 1.35 9/3/2023.  Urine drug screen 7/2023 and monitoring per pain management. Patient is taking tamsulosin for bph and symptoms vary dependent on the day. Denies dysuria or gross hematuria.           Objective     Past Medical History:   Diagnosis Date    Acid reflux     Arthritis     Back pain     BPH (benign prostatic hyperplasia)     Cervical spinal stenosis 07/09/2015    C3-4 C4-5 WITH MYELOMALACIA C4-5 AND C5-6    Cervical strain     Chest pain     Clotting disorder 2022    Hemroids    Condition not found     MEATAL STENOSIS    Coronary artery disease     Degeneration of lumbar intervertebral disc 03/12/2015    Degenerative cervical disc 11/17/2014    Diabetes 06/03/2021    ED (erectile dysfunction)     Elevated cholesterol     Encounter for medication monitoring 06/03/2021    Erectile dysfunction 2010    Headache     Heart attack     Heart disease     History of arthritis     HLD (hyperlipidemia)     HTN (hypertension)     Hyperlipemia     Hypogonadism male     Hypothyroid     Limb swelling     MI (myocardial infarction)     Neck pain     Other cervical disc degeneration, unspecified cervical region 07/02/2015    Seasonal allergies     Sleep apnea     SOB (shortness of breath)     Thyroid disease     Thyroid disorder     Urinary incontinence 2022       Past Surgical History:   Procedure Laterality Date    CARDIAC SURGERY      CERVICAL LAMINECTOMY  08/24/2015    C3-4 C4-5 C5-6    CHOLECYSTECTOMY      COLON RESECTION      COLONOSCOPY  2012    COLONOSCOPY N/A 10/26/2023    Procedure: COLONOSCOPY WITH POLYPECTOMIES;  Surgeon: Matteo Leavitt MD;  Location: [facility] ENDOSCOPY;  Service: Gastroenterology;  Laterality: N/A;  COLON POLYPS, HEMORRHOIDS    CORONARY ANGIOPLASTY WITH STENT PLACEMENT  2010    CYSTOSCOPY      ENDOSCOPY N/A 10/26/2023    Procedure: ESOPHAGOGASTRODUODENOSCOPY WITH BIOPSIES;  Surgeon: Matteo Leavitt MD;  Location: [facility] ENDOSCOPY;  Service:  "Gastroenterology;  Laterality: N/A;  NORMAL    GALLBLADDER SURGERY      HEMORRHOID BANDING      INGUINAL HERNIA REPAIR Left     KNEE SURGERY Right 01/15/2020    REPAIR    LUMBAR DISCECTOMY Right 10/14/2020    L3-4 DISECTOMY WITH LAMINECTOMY MINIMAL INVASIVE    NECK SURGERY      STOMACH SURGERY      VASECTOMY           Current Outpatient Medications:     amLODIPine (NORVASC) 5 MG tablet, Take 1 tablet by mouth Daily., Disp: , Rfl:     aspirin 81 MG EC tablet, Take 1 tablet by mouth Daily., Disp: , Rfl:     BD Disp Needles 18G X 1-1/2\" misc, USE FOR TESTOSTERONE INJECTION EVERY 2 WEEKS., Disp: 6 each, Rfl: 3    bisoprolol (ZEBeta) 5 MG tablet, Take 1 tablet by mouth Daily., Disp: , Rfl:     cholecalciferol (VITAMIN D3) 25 MCG (1000 UT) tablet, Vitamin D3 1,000 unit oral tablet take 1 tablet by oral route daily   Active, Disp: , Rfl:     cyclobenzaprine (FLEXERIL) 10 MG tablet, , Disp: , Rfl:     esomeprazole (nexIUM) 40 MG capsule, Take 1 capsule by mouth Daily., Disp: , Rfl:     gabapentin (NEURONTIN) 800 MG tablet, , Disp: , Rfl:     glipizide (GLUCOTROL) 5 MG tablet, glipizide 5 mg oral tablet take 1 tablet (5 mg) by oral route once daily before a meal   Active, Disp: , Rfl:     isosorbide mononitrate (IMDUR) 30 MG 24 hr tablet, , Disp: , Rfl:     levothyroxine (SYNTHROID, LEVOTHROID) 200 MCG tablet, Take 1 tablet by mouth Daily., Disp: , Rfl:     lisinopril (PRINIVIL,ZESTRIL) 5 MG tablet, Take 1 tablet by mouth Daily., Disp: , Rfl:     metFORMIN (GLUCOPHAGE) 1000 MG tablet, Take 1 tablet by mouth 2 (Two) Times a Day With Meals., Disp: , Rfl:     Mounjaro 12.5 MG/0.5ML solution auto-injector, , Disp: , Rfl:     nabumetone (RELAFEN) 500 MG tablet, Take 1 tablet by mouth Daily., Disp: , Rfl:     Naloxegol Oxalate (MOVANTIK) 25 MG tablet, Take 1 tablet by mouth Every Morning., Disp: 30 tablet, Rfl: 0    nebivolol (BYSTOLIC) 10 MG tablet, Take 1 tablet by mouth Daily., Disp: , Rfl:     nitroglycerin (NITROSTAT) 0.4 MG " "SL tablet, , Disp: , Rfl:     oxyCODONE-acetaminophen (PERCOCET) 7.5-325 MG per tablet, , Disp: , Rfl:     rosuvastatin (CRESTOR) 40 MG tablet, Take 1 tablet by mouth Daily., Disp: , Rfl:     Syringe/Needle, Disp, (B-D 3CC LUER-REYES SYR 25GX1\") 25G X 1\" 3 ML misc, USE WITH TESTOSTERONE EVERY 14 DAYS, Disp: 6 each, Rfl: 1    tamsulosin (FLOMAX) 0.4 MG capsule 24 hr capsule, Take 2 capsules by mouth Every Evening. 30 minutes after evening meal, Disp: 180 capsule, Rfl: 4    Testosterone Cypionate (DEPOTESTOTERONE CYPIONATE) 200 MG/ML injection, INJECT 1ML INTRAMUSCULARLY EVERY 14 DAYS, Disp: 2 mL, Rfl: 0    esomeprazole (nexIUM) 40 MG capsule, Take 1 capsule by mouth Daily., Disp: , Rfl:     Allergies   Allergen Reactions    Clopidogrel Hives and Other (See Comments)    Clopidogrel Bisulfate Unknown - High Severity    Sulfamethoxazole-Trimethoprim Hives and Other (See Comments)    Batroxobin Unknown - High Severity        Family History   Problem Relation Age of Onset    Heart disease Mother     Heart attack Mother     Heart disease Father     Diabetes Father     Hypertension Father     Colon cancer Neg Hx        Social History     Socioeconomic History    Marital status:     Number of children: 2   Tobacco Use    Smoking status: Every Day     Current packs/day: 1.00     Average packs/day: 1 pack/day for 35.0 years (35.0 ttl pk-yrs)     Types: Cigarettes     Passive exposure: Current    Smokeless tobacco: Former    Tobacco comments:     SMOKING 21-30 YEARS   Vaping Use    Vaping status: Never Used   Substance and Sexual Activity    Alcohol use: Not Currently     Comment: OCCASIONALLY    Drug use: Never    Sexual activity: Not Currently     Partners: Female       Vital Signs:   Resp 14   Ht 175.3 cm (69.02\")   Wt 99.8 kg (220 lb 0.3 oz)   BMI 32.48 kg/m²      Physical Exam  Vitals reviewed.   Constitutional:       Appearance: Normal appearance.   Neurological:      General: No focal deficit present.      Mental " Status: He is alert and oriented to person, place, and time.   Psychiatric:         Mood and Affect: Mood normal.         Behavior: Behavior normal.          Result Review :   The following data was reviewed by: SVETLANA Delgado on 06/20/2025:  Results for orders placed or performed in visit on 06/20/25   Bladder Scan    Collection Time: 06/20/25  2:08 PM   Result Value Ref Range    Urine Volume 0       Bladder Scan interpretation 06/20/2025    Estimation of residual urine via BVI 3000 Verathon Bladder Scan  MA/nurse performing: Bethanie HAUSER MA  Residual Urine: 0 ml  Indication: Benign prostatic hyperplasia with urinary hesitancy    Hypogonadism in male   Position: Supine  Examination: Incremental scanning of the suprapubic area using 2.0 MHz transducer using copious amounts of acoustic gel.   Findings: An anechoic area was demonstrated which represented the bladder, with measurement of residual urine as noted. I inspected this myself. In that the residual urine was stable or insignificant, refer to plan for treatment and plan necessary at this time.         Results  Labs   - PSA: 01/2025, 1.38      Procedures        Assessment and Plan    Diagnoses and all orders for this visit:    1. Benign prostatic hyperplasia with urinary hesitancy (Primary)  -     Bladder Scan  -     PSA DIAGNOSTIC; Future  -     tamsulosin (FLOMAX) 0.4 MG capsule 24 hr capsule; Take 2 capsules by mouth Every Evening. 30 minutes after evening meal  Dispense: 180 capsule; Refill: 4    2. Hypogonadism in male  -     PSA DIAGNOSTIC; Future        Assessment & Plan  1. Benign Prostatic Hyperplasia (BPH).  The patient's Prostate-Specific Antigen (PSA) levels have remained stable since the last evaluation in 01/2025, with a reading of 1.38. The dosage of tamsulosin will be adjusted to 2 capsules daily. A repeat PSA test has been ordered and can be conducted at his convenience. The prescription for tamsulosin has been sent to the pharmacy,  with a year's worth of refills provided.       Follow-up  A follow-up appointment is scheduled in 6 months.      Follow Up   Return in about 6 months (around 12/20/2025).  Patient was given instructions and counseling regarding his condition or for health maintenance advice. Please see specific information pulled into the AVS if appropriate.         This document has been electronically signed by SVETLANA Delgado  June 20, 2025 14:18 EDT     Patient or patient representative verbalized consent for the use of Ambient Listening during the visit with  SVETLANA Delgado for chart documentation. 6/20/2025  14:18 EDT

## 2025-06-25 ENCOUNTER — HOSPITAL ENCOUNTER (OUTPATIENT)
Dept: OTHER | Facility: HOSPITAL | Age: 60
Discharge: HOME | End: 2025-06-25

## 2025-06-25 DIAGNOSIS — R39.11 BENIGN PROSTATIC HYPERPLASIA WITH URINARY HESITANCY: Primary | ICD-10-CM

## 2025-06-25 DIAGNOSIS — N40.1 BENIGN PROSTATIC HYPERPLASIA WITH URINARY HESITANCY: Primary | ICD-10-CM

## 2025-07-24 ENCOUNTER — OFFICE VISIT (OUTPATIENT)
Dept: NEUROSURGERY | Facility: CLINIC | Age: 60
End: 2025-07-24
Payer: COMMERCIAL

## 2025-07-24 VITALS
BODY MASS INDEX: 26.66 KG/M2 | WEIGHT: 180 LBS | HEART RATE: 97 BPM | SYSTOLIC BLOOD PRESSURE: 106 MMHG | DIASTOLIC BLOOD PRESSURE: 54 MMHG | HEIGHT: 69 IN

## 2025-07-24 DIAGNOSIS — M51.26 HERNIATED NUCLEUS PULPOSUS, L2-3 RIGHT: Primary | ICD-10-CM

## 2025-07-24 DIAGNOSIS — M47.27 OSTEOARTHRITIS OF SPINE WITH RADICULOPATHY, LUMBOSACRAL REGION: ICD-10-CM

## 2025-07-24 NOTE — PROGRESS NOTES
Chief Complaint  Establish Care (NEW PATIENT: 2ND OPINION - Lumbago with sciatica, right side, MRI LUMBAR 6/27/25 [facility]), Sciatica (Lumbago with sciatica, right side stopping at his knee), and Back Pain (Ongoing for a few years has had back surgery in the past about 5 years ago by dr leal. Stated the pain has gotten worse. ///Physical therapy: went to [facility] in the past but goes to a ciropractor 1x weekly /Pain management: [facility])    Subjective          Jeronimo Phillips who is a 60 y.o. year old male who presents to [facility] for evaluation of lumbar spine.    History of Present Illness  The patient is a 60-year-old male presenting for low back pain with sciatica.    He underwent back surgery with Dr. Leal approximately 5 months ago and has been under the care of pain management since then. He experiences burning pain in his lower back that extends into his thigh, stopping at the knee. This pain has been present for 2 to 3 months but has intensified over the past 6 months. He reports two instances of severe pain, which he describes as excruciating. He also reports a slight imbalance and has had a few falls. He reports no bowel or bladder issues. He has been on leave from work since 06/14/2025 due to his condition. He works in [place] from [place], [place]. He has tried physical therapy and injections, but these have not provided relief. A cortisone injection administered by Dr. Hilton provided temporary relief, but the pain returned. His current medications include Percocet, and gabapentin, which have not significantly alleviated his pain.    He has a history of myocardial infarction in 1998 and is under the care of a cardiologist, Dr. Nails. He is currently on aspirin, amlodipine, bisoprolol, and lisinopril.    He is diabetic, with his last A1c reading being around 6.    He also reports severe neck pain, which limits his  "ability to look up or turn his head sharply. This pain forces him to sleep in awkward positions.    Social History:    Tobacco: The patient smokes cigarettes, approximately half a pack to a pack a day.     History of Previous Spinal Surgery?: Yes.  Lumbar, Date approx 5 years ago L3/4    Nicotine use: smoker  (0.5-1 ppd)    BMI: Body mass index is 26.57 kg/m².      Review of Systems   Musculoskeletal:  Positive for arthralgias, back pain and myalgias.   Neurological:  Positive for numbness (paresthesias).   All other systems reviewed and are negative.       Objective   Vital Signs:   /54   Pulse 97   Ht 175.3 cm (69.02\")   Wt 81.6 kg (180 lb)   BMI 26.57 kg/m²       Physical Exam  Vitals reviewed.   Constitutional:       Appearance: Normal appearance.   Musculoskeletal:      Lumbar back: Tenderness present. Positive right straight leg raise test. Negative left straight leg raise test.      Right hip: No tenderness. Normal range of motion.      Left hip: No tenderness. Normal range of motion.   Neurological:      Mental Status: He is alert.      Deep Tendon Reflexes:      Reflex Scores:       Patellar reflexes are 0 on the right side and 0 on the left side.       Achilles reflexes are 0 on the right side and 0 on the left side.       Neurological Exam  Mental Status  Alert.    Sensory  Right: Loss of sensation in the L3 dermatome.    Reflexes                                            Right                      Left  Patellar                                0                         0  Achilles                                0                         0    Gait  Casual gait: Antalgic gait.      Physical Exam  Motor Examination    Strength: Strength 5/5 in lower extremities bilaterally.    Straight Leg Raise Test: Negative straight leg raise test bilaterally.    Sensory Examination    Light Touch, Vibration and Proprioception: Light touch intact bilaterally in lower extremities.       Result Review : "       Data reviewed : Radiologic studies MRI Lumbar Spine on 6/27/25 at [facility] personally reviewed and interpreted. Degenerative changes in the lumbar spine. Post op changes at L2/3. At L2/3 there is disc degeneration with a right paracentral protrusion and facet arthritis contributing to severe right foraminal stenosis and moderately severe spinal stenosis. At L5/S1 there is a superimposed right paracentral disc protrusion displacing the right S1 nerve root.           Assessment and Plan    Diagnoses and all orders for this visit:    1. Herniated nucleus pulposus, L2-3 right-superior (Primary)  -     Case Request; Standing  -     Follow Anesthesia Guidelines / Protocol; Future  -     Follow Anesthesia Guidelines / Protocol; Standing  -     SCD (sequential compression device)- to be placed on patient in Pre-op; Standing  -     Verify / Perform Chlorhexidine Skin Prep; Standing  -     ceFAZolin (ANCEF) 2 g in sodium chloride 0.9 % 100 mL IVPB  -     Case Request    2. Osteoarthritis of spine with radiculopathy, lumbosacral region        Assessment & Plan  1. Low back pain with sciatica.  The MRI of the lumbar spine reveals degenerative changes throughout the spine, with significant findings at the L2-3 level, including a combination of disc bulge and herniation impinging on the right-sided nerves. This correlates with reported pain symptoms radiating into the thigh. The previous surgery at the L3-4 level is also noted, with evidence of disc degeneration contributing to chronic back pain. Given the severity of new leg pain, surgical intervention at the L2-3 level may be necessary. Previous attempts at physical therapy and injections have not resulted in significant improvement. A discussion regarding potential surgical options for the L2-3 region was initiated. If surgery is opted for, further discussions on other pain management strategies will be considered.    Dr. Leal reviewed his MRI. Discussed  right minimally invasive discectomy at L2/3. Risks and benefits discussed. Pt wishes to proceed.    2. History of myocardial infarction.  A heart attack occurred in 1998, and the patient is currently under the care of a cardiologist, Dr. Nails. Medications include aspirin, amlodipine, bisoprolol, and lisinopril for blood pressure management.    3. Diabetes mellitus.  Diabetic status is confirmed with recent blood sugar levels around 6.         Follow Up   Return for Post-op Follow up.  Patient was given instructions and counseling regarding his condition or for health maintenance advice.     Patient or patient representative verbalized consent for the use of Ambient Listening during the visit with  SVETLANA Serrano for chart documentation. 7/24/2025  09:15 EDT      He has a right L2-3 disc herniation, superior. He has failed PT, injections and chiropractic care. The pain has been for several months and worse in the last 2 months.

## 2025-08-12 ENCOUNTER — ANESTHESIA EVENT (OUTPATIENT)
Dept: PERIOP | Facility: HOSPITAL | Age: 60
End: 2025-08-12
Payer: COMMERCIAL

## 2025-08-13 ENCOUNTER — ANESTHESIA (OUTPATIENT)
Dept: PERIOP | Facility: HOSPITAL | Age: 60
End: 2025-08-13
Payer: COMMERCIAL

## 2025-08-13 ENCOUNTER — APPOINTMENT (OUTPATIENT)
Dept: GENERAL RADIOLOGY | Facility: HOSPITAL | Age: 60
End: 2025-08-13
Payer: COMMERCIAL

## 2025-08-13 ENCOUNTER — HOSPITAL ENCOUNTER (OUTPATIENT)
Facility: HOSPITAL | Age: 60
Setting detail: HOSPITAL OUTPATIENT SURGERY
Discharge: HOME | End: 2025-08-13
Attending: NEUROLOGICAL SURGERY | Admitting: NEUROLOGICAL SURGERY
Payer: COMMERCIAL

## 2025-08-13 VITALS
BODY MASS INDEX: 27.82 KG/M2 | RESPIRATION RATE: 16 BRPM | TEMPERATURE: 97.7 F | WEIGHT: 177.25 LBS | OXYGEN SATURATION: 97 % | SYSTOLIC BLOOD PRESSURE: 122 MMHG | HEART RATE: 72 BPM | DIASTOLIC BLOOD PRESSURE: 78 MMHG | HEIGHT: 67 IN

## 2025-08-13 DIAGNOSIS — M51.26 HERNIATED NUCLEUS PULPOSUS, L2-3 RIGHT: ICD-10-CM

## 2025-08-13 LAB
ANION GAP SERPL CALCULATED.3IONS-SCNC: 7.7 MMOL/L (ref 5–15)
BUN SERPL-MCNC: 19.8 MG/DL (ref 8–23)
BUN/CREAT SERPL: 27.9 (ref 7–25)
CALCIUM SPEC-SCNC: 9.6 MG/DL (ref 8.6–10.5)
CHLORIDE SERPL-SCNC: 103 MMOL/L (ref 98–107)
CO2 SERPL-SCNC: 26.3 MMOL/L (ref 22–29)
CREAT SERPL-MCNC: 0.71 MG/DL (ref 0.76–1.27)
EGFRCR SERPLBLD CKD-EPI 2021: 103.7 ML/MIN/1.73
GLUCOSE BLDC GLUCOMTR-MCNC: 107 MG/DL (ref 70–99)
GLUCOSE SERPL-MCNC: 88 MG/DL (ref 65–99)
POTASSIUM SERPL-SCNC: 4.1 MMOL/L (ref 3.5–5.2)
QT INTERVAL: 360 MS
QTC INTERVAL: 397 MS
SODIUM SERPL-SCNC: 137 MMOL/L (ref 136–145)

## 2025-08-13 PROCEDURE — 25010000002 KETOROLAC TROMETHAMINE PER 15 MG: Performed by: NURSE ANESTHETIST, CERTIFIED REGISTERED

## 2025-08-13 PROCEDURE — 25010000002 LIDOCAINE PF 2% 2 % SOLUTION: Performed by: NURSE ANESTHETIST, CERTIFIED REGISTERED

## 2025-08-13 PROCEDURE — 25010000002 CEFAZOLIN PER 500 MG: Performed by: NEUROLOGICAL SURGERY

## 2025-08-13 PROCEDURE — 63030 LAMOT DCMPRN NRV RT 1 LMBR: CPT | Performed by: NEUROLOGICAL SURGERY

## 2025-08-13 PROCEDURE — 82948 REAGENT STRIP/BLOOD GLUCOSE: CPT | Performed by: NURSE ANESTHETIST, CERTIFIED REGISTERED

## 2025-08-13 PROCEDURE — 25010000002 MIDAZOLAM PER 1MG: Performed by: ANESTHESIOLOGY

## 2025-08-13 PROCEDURE — 93005 ELECTROCARDIOGRAM TRACING: CPT | Performed by: NEUROLOGICAL SURGERY

## 2025-08-13 PROCEDURE — 25010000002 ONDANSETRON PER 1 MG: Performed by: NURSE ANESTHETIST, CERTIFIED REGISTERED

## 2025-08-13 PROCEDURE — 80048 BASIC METABOLIC PNL TOTAL CA: CPT | Performed by: NEUROLOGICAL SURGERY

## 2025-08-13 PROCEDURE — 25810000003 LACTATED RINGERS PER 1000 ML: Performed by: ANESTHESIOLOGY

## 2025-08-13 PROCEDURE — 25010000002 PROPOFOL 10 MG/ML EMULSION: Performed by: NURSE ANESTHETIST, CERTIFIED REGISTERED

## 2025-08-13 PROCEDURE — 25010000002 PHENYLEPHRINE HCL-NACL 1000-0.9 MCG/10ML-% SOLUTION PREFILLED SYRINGE: Performed by: NURSE ANESTHETIST, CERTIFIED REGISTERED

## 2025-08-13 PROCEDURE — 25010000002 LIDOCAINE 1% - EPINEPHRINE 1:100000 1 %-1:100000 SOLUTION: Performed by: NEUROLOGICAL SURGERY

## 2025-08-13 PROCEDURE — 25010000002 METHYLPREDNISOLONE PER 40 MG: Performed by: NEUROLOGICAL SURGERY

## 2025-08-13 PROCEDURE — 76000 FLUOROSCOPY <1 HR PHYS/QHP: CPT

## 2025-08-13 PROCEDURE — 63030 LAMOT DCMPRN NRV RT 1 LMBR: CPT | Performed by: SPECIALIST/TECHNOLOGIST, OTHER

## 2025-08-13 PROCEDURE — 25010000002 FENTANYL CITRATE (PF) 50 MCG/ML SOLUTION: Performed by: NURSE ANESTHETIST, CERTIFIED REGISTERED

## 2025-08-13 PROCEDURE — 25010000002 DEXAMETHASONE PER 1 MG: Performed by: NURSE ANESTHETIST, CERTIFIED REGISTERED

## 2025-08-13 PROCEDURE — 25010000002 SUGAMMADEX 200 MG/2ML SOLUTION: Performed by: NURSE ANESTHETIST, CERTIFIED REGISTERED

## 2025-08-13 RX ORDER — ACETAMINOPHEN 500 MG
1000 TABLET ORAL ONCE
Status: COMPLETED | OUTPATIENT
Start: 2025-08-13 | End: 2025-08-13

## 2025-08-13 RX ORDER — PHENYLEPHRINE HCL IN 0.9% NACL 1 MG/10 ML
SYRINGE (ML) INTRAVENOUS AS NEEDED
Status: DISCONTINUED | OUTPATIENT
Start: 2025-08-13 | End: 2025-08-13 | Stop reason: SURG

## 2025-08-13 RX ORDER — ONDANSETRON 2 MG/ML
4 INJECTION INTRAMUSCULAR; INTRAVENOUS ONCE AS NEEDED
Status: DISCONTINUED | OUTPATIENT
Start: 2025-08-13 | End: 2025-08-13 | Stop reason: HOSPADM

## 2025-08-13 RX ORDER — LIDOCAINE HYDROCHLORIDE 20 MG/ML
INJECTION, SOLUTION EPIDURAL; INFILTRATION; INTRACAUDAL; PERINEURAL AS NEEDED
Status: DISCONTINUED | OUTPATIENT
Start: 2025-08-13 | End: 2025-08-13 | Stop reason: SURG

## 2025-08-13 RX ORDER — ROCURONIUM BROMIDE 10 MG/ML
INJECTION, SOLUTION INTRAVENOUS AS NEEDED
Status: DISCONTINUED | OUTPATIENT
Start: 2025-08-13 | End: 2025-08-13 | Stop reason: SURG

## 2025-08-13 RX ORDER — OXYCODONE HYDROCHLORIDE 5 MG/1
5 TABLET ORAL
Status: DISCONTINUED | OUTPATIENT
Start: 2025-08-13 | End: 2025-08-13 | Stop reason: HOSPADM

## 2025-08-13 RX ORDER — PROMETHAZINE HYDROCHLORIDE 25 MG/1
25 SUPPOSITORY RECTAL ONCE AS NEEDED
Status: DISCONTINUED | OUTPATIENT
Start: 2025-08-13 | End: 2025-08-13 | Stop reason: HOSPADM

## 2025-08-13 RX ORDER — KETOROLAC TROMETHAMINE 30 MG/ML
INJECTION, SOLUTION INTRAMUSCULAR; INTRAVENOUS AS NEEDED
Status: DISCONTINUED | OUTPATIENT
Start: 2025-08-13 | End: 2025-08-13 | Stop reason: SURG

## 2025-08-13 RX ORDER — LIDOCAINE HYDROCHLORIDE AND EPINEPHRINE 10; 10 MG/ML; UG/ML
INJECTION, SOLUTION INFILTRATION; PERINEURAL AS NEEDED
Status: DISCONTINUED | OUTPATIENT
Start: 2025-08-13 | End: 2025-08-13 | Stop reason: HOSPADM

## 2025-08-13 RX ORDER — DEXAMETHASONE SODIUM PHOSPHATE 4 MG/ML
INJECTION, SOLUTION INTRA-ARTICULAR; INTRALESIONAL; INTRAMUSCULAR; INTRAVENOUS; SOFT TISSUE AS NEEDED
Status: DISCONTINUED | OUTPATIENT
Start: 2025-08-13 | End: 2025-08-13 | Stop reason: SURG

## 2025-08-13 RX ORDER — MIDAZOLAM HYDROCHLORIDE 2 MG/2ML
2 INJECTION, SOLUTION INTRAMUSCULAR; INTRAVENOUS ONCE
Status: COMPLETED | OUTPATIENT
Start: 2025-08-13 | End: 2025-08-13

## 2025-08-13 RX ORDER — METHYLPREDNISOLONE ACETATE 40 MG/ML
INJECTION, SUSPENSION INTRA-ARTICULAR; INTRALESIONAL; INTRAMUSCULAR; SOFT TISSUE AS NEEDED
Status: DISCONTINUED | OUTPATIENT
Start: 2025-08-13 | End: 2025-08-13 | Stop reason: HOSPADM

## 2025-08-13 RX ORDER — PROMETHAZINE HYDROCHLORIDE 25 MG/1
25 TABLET ORAL ONCE AS NEEDED
Status: DISCONTINUED | OUTPATIENT
Start: 2025-08-13 | End: 2025-08-13 | Stop reason: HOSPADM

## 2025-08-13 RX ORDER — PROPOFOL 10 MG/ML
VIAL (ML) INTRAVENOUS AS NEEDED
Status: DISCONTINUED | OUTPATIENT
Start: 2025-08-13 | End: 2025-08-13 | Stop reason: SURG

## 2025-08-13 RX ORDER — MAGNESIUM HYDROXIDE 1200 MG/15ML
LIQUID ORAL AS NEEDED
Status: DISCONTINUED | OUTPATIENT
Start: 2025-08-13 | End: 2025-08-13 | Stop reason: HOSPADM

## 2025-08-13 RX ORDER — SODIUM CHLORIDE, SODIUM LACTATE, POTASSIUM CHLORIDE, CALCIUM CHLORIDE 600; 310; 30; 20 MG/100ML; MG/100ML; MG/100ML; MG/100ML
9 INJECTION, SOLUTION INTRAVENOUS CONTINUOUS PRN
Status: DISCONTINUED | OUTPATIENT
Start: 2025-08-13 | End: 2025-08-13 | Stop reason: HOSPADM

## 2025-08-13 RX ORDER — ONDANSETRON 2 MG/ML
INJECTION INTRAMUSCULAR; INTRAVENOUS AS NEEDED
Status: DISCONTINUED | OUTPATIENT
Start: 2025-08-13 | End: 2025-08-13 | Stop reason: SURG

## 2025-08-13 RX ORDER — FENTANYL CITRATE 50 UG/ML
INJECTION, SOLUTION INTRAMUSCULAR; INTRAVENOUS AS NEEDED
Status: DISCONTINUED | OUTPATIENT
Start: 2025-08-13 | End: 2025-08-13 | Stop reason: SURG

## 2025-08-13 RX ADMIN — FENTANYL CITRATE 50 MCG: 50 INJECTION, SOLUTION INTRAMUSCULAR; INTRAVENOUS at 09:09

## 2025-08-13 RX ADMIN — ACETAMINOPHEN 500 MG: 500 TABLET, FILM COATED ORAL at 08:46

## 2025-08-13 RX ADMIN — LIDOCAINE HYDROCHLORIDE 60 MG: 20 INJECTION, SOLUTION EPIDURAL; INFILTRATION; INTRACAUDAL; PERINEURAL at 09:09

## 2025-08-13 RX ADMIN — Medication 100 MCG: at 09:53

## 2025-08-13 RX ADMIN — SODIUM CHLORIDE, POTASSIUM CHLORIDE, SODIUM LACTATE AND CALCIUM CHLORIDE 9 ML/HR: 600; 310; 30; 20 INJECTION, SOLUTION INTRAVENOUS at 08:48

## 2025-08-13 RX ADMIN — SUGAMMADEX 200 MG: 100 INJECTION, SOLUTION INTRAVENOUS at 10:13

## 2025-08-13 RX ADMIN — ONDANSETRON 4 MG: 2 INJECTION, SOLUTION INTRAMUSCULAR; INTRAVENOUS at 10:13

## 2025-08-13 RX ADMIN — Medication 100 MCG: at 09:49

## 2025-08-13 RX ADMIN — Medication 100 MCG: at 10:02

## 2025-08-13 RX ADMIN — PROPOFOL 150 MG: 10 INJECTION, EMULSION INTRAVENOUS at 09:09

## 2025-08-13 RX ADMIN — SODIUM CHLORIDE 2 G: 9 INJECTION, SOLUTION INTRAVENOUS at 09:03

## 2025-08-13 RX ADMIN — Medication 100 MCG: at 09:36

## 2025-08-13 RX ADMIN — DEXAMETHASONE SODIUM PHOSPHATE 8 MG: 4 INJECTION, SOLUTION INTRAMUSCULAR; INTRAVENOUS at 09:14

## 2025-08-13 RX ADMIN — SODIUM CHLORIDE, POTASSIUM CHLORIDE, SODIUM LACTATE AND CALCIUM CHLORIDE: 600; 310; 30; 20 INJECTION, SOLUTION INTRAVENOUS at 10:23

## 2025-08-13 RX ADMIN — Medication 100 MCG: at 10:11

## 2025-08-13 RX ADMIN — KETOROLAC TROMETHAMINE 30 MG: 30 INJECTION, SOLUTION INTRAMUSCULAR; INTRAVENOUS at 10:13

## 2025-08-13 RX ADMIN — Medication 100 MCG: at 09:32

## 2025-08-13 RX ADMIN — ROCURONIUM BROMIDE 50 MG: 10 INJECTION, SOLUTION INTRAVENOUS at 09:09

## 2025-08-13 RX ADMIN — MIDAZOLAM HYDROCHLORIDE 2 MG: 1 INJECTION, SOLUTION INTRAMUSCULAR; INTRAVENOUS at 08:48

## 2025-08-13 RX ADMIN — Medication 100 MCG: at 09:43

## 2025-08-13 RX ADMIN — FENTANYL CITRATE 50 MCG: 50 INJECTION, SOLUTION INTRAMUSCULAR; INTRAVENOUS at 09:20

## 2025-08-19 ENCOUNTER — TELEPHONE (OUTPATIENT)
Dept: NEUROSURGERY | Facility: CLINIC | Age: 60
End: 2025-08-19
Payer: COMMERCIAL

## 2025-08-19 ENCOUNTER — TELEPHONE (OUTPATIENT)
Dept: NEUROLOGY | Facility: CLINIC | Age: 60
End: 2025-08-19
Payer: COMMERCIAL

## 2025-08-22 LAB
QT INTERVAL: 360 MS
QTC INTERVAL: 397 MS

## (undated) DEVICE — GLV SURG BIOGEL LTX PF 7 1/2

## (undated) DEVICE — Device

## (undated) DEVICE — STERILE POLYISOPRENE POWDER-FREE SURGICAL GLOVES WITH EMOLLIENT COATING: Brand: PROTEXIS

## (undated) DEVICE — SYR LL TP 10ML STRL

## (undated) DEVICE — SINGLE-USE BIOPSY FORCEPS: Brand: RADIAL JAW 4

## (undated) DEVICE — GAMMEX® NON-LATEX SIZE 7.5, STERILE NEOPRENE POWDER-FREE SURGICAL GLOVE: Brand: GAMMEX

## (undated) DEVICE — CONN JET HYDRA H20 AUXILIARY DISP

## (undated) DEVICE — SNAR E/S POLYP SNAREMASTER OVL/10MM 2.8X2300MM YEL

## (undated) DEVICE — THE SINGLE USE ETRAP – POLYP TRAP IS USED FOR SUCTION RETRIEVAL OF ENDOSCOPICALLY REMOVED POLYPS.: Brand: ETRAP

## (undated) DEVICE — THE STERILE LIGHT HANDLE COVER IS USED WITH STERIS SURGICAL LIGHTING AND VISUALIZATION SYSTEMS.

## (undated) DEVICE — BLCK/BITE BLOX WO/DENTL/RIM W/STRAP 54F

## (undated) DEVICE — SUT VIC 0/0 UR6 27IN DYED J603H

## (undated) DEVICE — SLV SCD KN/LEN ADJ EXPRSS BLENDED MD 1P/U

## (undated) DEVICE — SOLIDIFIER LIQLOC PLS 1500CC BT

## (undated) DEVICE — APPL CHLORAPREP HI/LITE 26ML ORNG

## (undated) DEVICE — LAMINECTOMY CERVICAL DISC-LF: Brand: MEDLINE INDUSTRIES, INC.

## (undated) DEVICE — SOL IRRG H2O PL/BG 1000ML STRL

## (undated) DEVICE — DRP MICROSCP LECIA W/CLEARLENS 137X381CM

## (undated) DEVICE — ANTIBACTERIAL UNDYED BRAIDED (POLYGLACTIN 910), SYNTHETIC ABSORBABLE SUTURE: Brand: COATED VICRYL

## (undated) DEVICE — LINER SURG CANSTR SXN S/RIGD 1500CC

## (undated) DEVICE — Device: Brand: DEFENDO AIR/WATER/SUCTION AND BIOPSY VALVE